# Patient Record
Sex: MALE | Race: WHITE | NOT HISPANIC OR LATINO | Employment: OTHER | URBAN - METROPOLITAN AREA
[De-identification: names, ages, dates, MRNs, and addresses within clinical notes are randomized per-mention and may not be internally consistent; named-entity substitution may affect disease eponyms.]

---

## 2017-12-21 ENCOUNTER — HOSPITAL ENCOUNTER (OUTPATIENT)
Dept: GENERAL RADIOLOGY | Facility: HOSPITAL | Age: 72
Discharge: HOME | End: 2017-12-21
Attending: UROLOGY | Admitting: UROLOGY

## 2017-12-21 ENCOUNTER — TRANSCRIBE ORDERS (OUTPATIENT)
Dept: ADMINISTRATIVE | Facility: HOSPITAL | Age: 72
End: 2017-12-21

## 2017-12-21 DIAGNOSIS — N20.0 CALCULUS, RENAL: Primary | ICD-10-CM

## 2017-12-21 DIAGNOSIS — N20.0 CALCULUS, RENAL: ICD-10-CM

## 2017-12-21 PROCEDURE — 74000 HC ABDOMEN KUB: CPT

## 2018-12-20 ENCOUNTER — HOSPITAL ENCOUNTER (OUTPATIENT)
Dept: GENERAL RADIOLOGY | Facility: HOSPITAL | Age: 73
Discharge: HOME | End: 2018-12-20
Attending: UROLOGY | Admitting: UROLOGY

## 2018-12-20 ENCOUNTER — TRANSCRIBE ORDERS (OUTPATIENT)
Dept: ADMINISTRATIVE | Facility: HOSPITAL | Age: 73
End: 2018-12-20

## 2018-12-20 DIAGNOSIS — N20.0 CALCULUS, RENAL: ICD-10-CM

## 2018-12-20 DIAGNOSIS — N20.0 CALCULUS, RENAL: Primary | ICD-10-CM

## 2018-12-20 PROCEDURE — 74018 RADEX ABDOMEN 1 VIEW: CPT

## 2021-05-20 ENCOUNTER — HOSPITAL ENCOUNTER (OUTPATIENT)
Dept: CT IMAGING | Facility: HOSPITAL | Age: 76
Discharge: HOME | End: 2021-05-20
Admitting: UROLOGY

## 2021-05-20 ENCOUNTER — TRANSCRIBE ORDERS (OUTPATIENT)
Dept: ADMINISTRATIVE | Facility: HOSPITAL | Age: 76
End: 2021-05-20

## 2021-05-20 DIAGNOSIS — N20.1 CALCULUS OF URETER: Primary | ICD-10-CM

## 2021-05-20 DIAGNOSIS — N20.1 CALCULUS OF URETER: ICD-10-CM

## 2021-05-20 PROCEDURE — 74176 CT ABD & PELVIS W/O CONTRAST: CPT

## 2021-12-07 ENCOUNTER — LAB (OUTPATIENT)
Dept: LAB | Facility: HOSPITAL | Age: 76
End: 2021-12-07

## 2021-12-07 ENCOUNTER — TRANSCRIBE ORDERS (OUTPATIENT)
Dept: ADMINISTRATIVE | Facility: HOSPITAL | Age: 76
End: 2021-12-07

## 2021-12-07 DIAGNOSIS — N18.30 STAGE 3 CHRONIC KIDNEY DISEASE, UNSPECIFIED WHETHER STAGE 3A OR 3B CKD (HCC): Primary | ICD-10-CM

## 2021-12-07 DIAGNOSIS — N18.30 STAGE 3 CHRONIC KIDNEY DISEASE, UNSPECIFIED WHETHER STAGE 3A OR 3B CKD (HCC): ICD-10-CM

## 2021-12-07 LAB
ALBUMIN SERPL-MCNC: 4.3 G/DL (ref 3.5–5.2)
ALBUMIN/GLOB SERPL: 1.4 G/DL
ALP SERPL-CCNC: 63 U/L (ref 39–117)
ALT SERPL W P-5'-P-CCNC: 22 U/L (ref 1–41)
ANION GAP SERPL CALCULATED.3IONS-SCNC: 12 MMOL/L (ref 5–15)
AST SERPL-CCNC: 22 U/L (ref 1–40)
BILIRUB SERPL-MCNC: 0.3 MG/DL (ref 0–1.2)
BILIRUB UR QL STRIP: NEGATIVE
BUN SERPL-MCNC: 26 MG/DL (ref 8–23)
BUN/CREAT SERPL: 16.5 (ref 7–25)
CALCIUM SPEC-SCNC: 9.7 MG/DL (ref 8.6–10.5)
CHLORIDE SERPL-SCNC: 103 MMOL/L (ref 98–107)
CLARITY UR: CLEAR
CO2 SERPL-SCNC: 27 MMOL/L (ref 22–29)
COLOR UR: YELLOW
CREAT SERPL-MCNC: 1.58 MG/DL (ref 0.76–1.27)
GFR SERPL CREATININE-BSD FRML MDRD: 43 ML/MIN/1.73
GLOBULIN UR ELPH-MCNC: 3.1 GM/DL
GLUCOSE SERPL-MCNC: 168 MG/DL (ref 65–99)
GLUCOSE UR STRIP-MCNC: NEGATIVE MG/DL
HGB UR QL STRIP.AUTO: NEGATIVE
KETONES UR QL STRIP: NEGATIVE
LEUKOCYTE ESTERASE UR QL STRIP.AUTO: NEGATIVE
MAGNESIUM SERPL-MCNC: 1.7 MG/DL (ref 1.6–2.4)
NITRITE UR QL STRIP: NEGATIVE
PH UR STRIP.AUTO: <=5 [PH] (ref 5–8)
PHOSPHATE SERPL-MCNC: 3.3 MG/DL (ref 2.5–4.5)
POTASSIUM SERPL-SCNC: 4.5 MMOL/L (ref 3.5–5.2)
PROT SERPL-MCNC: 7.4 G/DL (ref 6–8.5)
PROT UR QL STRIP: NEGATIVE
PTH-INTACT SERPL-MCNC: 52 PG/ML (ref 15–65)
SODIUM SERPL-SCNC: 142 MMOL/L (ref 136–145)
SP GR UR STRIP: 1.01 (ref 1–1.03)
URATE SERPL-MCNC: 5.1 MG/DL (ref 3.4–7)
UROBILINOGEN UR QL STRIP: NORMAL

## 2021-12-07 PROCEDURE — 80053 COMPREHEN METABOLIC PANEL: CPT

## 2021-12-07 PROCEDURE — 83970 ASSAY OF PARATHORMONE: CPT

## 2021-12-07 PROCEDURE — 81003 URINALYSIS AUTO W/O SCOPE: CPT | Performed by: INTERNAL MEDICINE

## 2021-12-07 PROCEDURE — 36415 COLL VENOUS BLD VENIPUNCTURE: CPT

## 2021-12-07 PROCEDURE — 84550 ASSAY OF BLOOD/URIC ACID: CPT

## 2021-12-07 PROCEDURE — 83735 ASSAY OF MAGNESIUM: CPT

## 2021-12-07 PROCEDURE — 84100 ASSAY OF PHOSPHORUS: CPT

## 2022-03-24 ENCOUNTER — TRANSCRIBE ORDERS (OUTPATIENT)
Dept: ADMINISTRATIVE | Facility: HOSPITAL | Age: 77
End: 2022-03-24

## 2022-03-24 ENCOUNTER — HOSPITAL ENCOUNTER (OUTPATIENT)
Dept: GENERAL RADIOLOGY | Facility: HOSPITAL | Age: 77
Discharge: HOME | End: 2022-03-24
Admitting: UROLOGY

## 2022-03-24 DIAGNOSIS — N20.0 KIDNEY STONES: Primary | ICD-10-CM

## 2022-03-24 DIAGNOSIS — N20.0 KIDNEY STONES: ICD-10-CM

## 2022-03-24 PROCEDURE — 74018 RADEX ABDOMEN 1 VIEW: CPT

## 2022-11-29 ENCOUNTER — LAB (OUTPATIENT)
Dept: LAB | Facility: HOSPITAL | Age: 77
End: 2022-11-29

## 2022-11-29 ENCOUNTER — TRANSCRIBE ORDERS (OUTPATIENT)
Dept: ADMINISTRATIVE | Facility: HOSPITAL | Age: 77
End: 2022-11-29

## 2022-11-29 DIAGNOSIS — E11.22 TYPE 2 DIABETES MELLITUS WITH ESRD (END-STAGE RENAL DISEASE): ICD-10-CM

## 2022-11-29 DIAGNOSIS — N20.0 URIC ACID NEPHROLITHIASIS: ICD-10-CM

## 2022-11-29 DIAGNOSIS — N18.6 TYPE 2 DIABETES MELLITUS WITH ESRD (END-STAGE RENAL DISEASE): ICD-10-CM

## 2022-11-29 DIAGNOSIS — I12.9 HYPERTENSIVE NEPHROPATHY: ICD-10-CM

## 2022-11-29 DIAGNOSIS — N18.32 CHRONIC KIDNEY DISEASE (CKD) STAGE G3B/A1, MODERATELY DECREASED GLOMERULAR FILTRATION RATE (GFR) BETWEEN 30-44 ML/MIN/1.73 SQUARE METER AND ALBUMINURIA CREATININE RATIO LESS THAN 30 MG/G (CMS/H*: Primary | ICD-10-CM

## 2022-11-29 DIAGNOSIS — N18.32 CHRONIC KIDNEY DISEASE (CKD) STAGE G3B/A1, MODERATELY DECREASED GLOMERULAR FILTRATION RATE (GFR) BETWEEN 30-44 ML/MIN/1.73 SQUARE METER AND ALBUMINURIA CREATININE RATIO LESS THAN 30 MG/G (CMS/H*: ICD-10-CM

## 2022-11-29 LAB
ALBUMIN SERPL-MCNC: 4.3 G/DL (ref 3.5–5.2)
ALBUMIN UR-MCNC: <1.2 MG/DL
ANION GAP SERPL CALCULATED.3IONS-SCNC: 10.2 MMOL/L (ref 5–15)
BACTERIA UR QL AUTO: NORMAL /HPF
BILIRUB UR QL STRIP: NEGATIVE
BUN SERPL-MCNC: 29 MG/DL (ref 8–23)
BUN/CREAT SERPL: 13.7 (ref 7–25)
CALCIUM SPEC-SCNC: 9.9 MG/DL (ref 8.6–10.5)
CHLORIDE SERPL-SCNC: 97 MMOL/L (ref 98–107)
CLARITY UR: CLEAR
CO2 SERPL-SCNC: 30.8 MMOL/L (ref 22–29)
COLOR UR: YELLOW
CREAT SERPL-MCNC: 2.12 MG/DL (ref 0.76–1.27)
CREAT UR-MCNC: 76.6 MG/DL
CREAT UR-MCNC: 76.6 MG/DL
EGFRCR SERPLBLD CKD-EPI 2021: 31.1 ML/MIN/1.73
GLUCOSE SERPL-MCNC: 295 MG/DL (ref 65–99)
GLUCOSE UR STRIP-MCNC: NEGATIVE MG/DL
HGB UR QL STRIP.AUTO: NEGATIVE
HYALINE CASTS UR QL AUTO: NORMAL /LPF
KETONES UR QL STRIP: NEGATIVE
LEUKOCYTE ESTERASE UR QL STRIP.AUTO: NEGATIVE
MICROALBUMIN/CREAT UR: NORMAL MG/G{CREAT}
NITRITE UR QL STRIP: NEGATIVE
PH UR STRIP.AUTO: 6 [PH] (ref 5–8)
PHOSPHATE SERPL-MCNC: 3.5 MG/DL (ref 2.5–4.5)
POTASSIUM SERPL-SCNC: 4.5 MMOL/L (ref 3.5–5.2)
PROT ?TM UR-MCNC: 4.4 MG/DL
PROT UR QL STRIP: NEGATIVE
PROT/CREAT UR: 57.4 MG/G CREA (ref 0–200)
RBC # UR STRIP: NORMAL /HPF
REF LAB TEST METHOD: NORMAL
SODIUM SERPL-SCNC: 138 MMOL/L (ref 136–145)
SP GR UR STRIP: 1.01 (ref 1–1.03)
SQUAMOUS #/AREA URNS HPF: NORMAL /HPF
UROBILINOGEN UR QL STRIP: NORMAL
WBC # UR STRIP: NORMAL /HPF

## 2022-11-29 PROCEDURE — 36415 COLL VENOUS BLD VENIPUNCTURE: CPT

## 2022-11-29 PROCEDURE — 82043 UR ALBUMIN QUANTITATIVE: CPT

## 2022-11-29 PROCEDURE — 82570 ASSAY OF URINE CREATININE: CPT

## 2022-11-29 PROCEDURE — 81001 URINALYSIS AUTO W/SCOPE: CPT

## 2022-11-29 PROCEDURE — 84156 ASSAY OF PROTEIN URINE: CPT

## 2022-11-29 PROCEDURE — 80069 RENAL FUNCTION PANEL: CPT

## 2022-12-20 ENCOUNTER — HOSPITAL ENCOUNTER (INPATIENT)
Facility: HOSPITAL | Age: 77
LOS: 8 days | Discharge: HOME HEALTH | DRG: 252 | End: 2022-12-28
Attending: EMERGENCY MEDICINE | Admitting: INTERNAL MEDICINE
Payer: MEDICARE

## 2022-12-20 ENCOUNTER — APPOINTMENT (OUTPATIENT)
Dept: CT IMAGING | Facility: HOSPITAL | Age: 77
DRG: 252 | End: 2022-12-20
Payer: MEDICARE

## 2022-12-20 ENCOUNTER — APPOINTMENT (OUTPATIENT)
Dept: GENERAL RADIOLOGY | Facility: HOSPITAL | Age: 77
DRG: 252 | End: 2022-12-20
Payer: MEDICARE

## 2022-12-20 DIAGNOSIS — I46.9 CARDIAC ARREST: Primary | ICD-10-CM

## 2022-12-20 DIAGNOSIS — G89.4 CHRONIC PAIN SYNDROME: ICD-10-CM

## 2022-12-20 LAB
ALBUMIN SERPL-MCNC: 3.3 G/DL (ref 3.5–5.2)
ALBUMIN/GLOB SERPL: 1.3 G/DL
ALP SERPL-CCNC: 58 U/L (ref 39–117)
ALT SERPL W P-5'-P-CCNC: 21 U/L (ref 1–41)
ANION GAP SERPL CALCULATED.3IONS-SCNC: 8 MMOL/L (ref 5–15)
APTT PPP: 31.5 SECONDS (ref 22.7–35.4)
ARTERIAL PATENCY WRIST A: POSITIVE
AST SERPL-CCNC: 34 U/L (ref 1–40)
ATMOSPHERIC PRESS: 756.5 MMHG
BASE EXCESS BLDA CALC-SCNC: 0.1 MMOL/L (ref 0–2)
BDY SITE: ABNORMAL
BILIRUB SERPL-MCNC: 0.5 MG/DL (ref 0–1.2)
BUN SERPL-MCNC: 27 MG/DL (ref 8–23)
BUN/CREAT SERPL: 16.5 (ref 7–25)
CALCIUM SPEC-SCNC: 8.6 MG/DL (ref 8.6–10.5)
CHLORIDE SERPL-SCNC: 108 MMOL/L (ref 98–107)
CO2 SERPL-SCNC: 25 MMOL/L (ref 22–29)
CREAT SERPL-MCNC: 1.64 MG/DL (ref 0.76–1.27)
D-LACTATE SERPL-SCNC: 8.7 MMOL/L (ref 0.5–2)
DEPRECATED RDW RBC AUTO: 50.1 FL (ref 37–54)
EGFRCR SERPLBLD CKD-EPI 2021: 42.3 ML/MIN/1.73
EOSINOPHIL # BLD MANUAL: 0.19 10*3/MM3 (ref 0–0.4)
EOSINOPHIL NFR BLD MANUAL: 3.2 % (ref 0.3–6.2)
ERYTHROCYTE [DISTWIDTH] IN BLOOD BY AUTOMATED COUNT: 12.9 % (ref 12.3–15.4)
GLOBULIN UR ELPH-MCNC: 2.6 GM/DL
GLUCOSE BLDC GLUCOMTR-MCNC: 168 MG/DL (ref 70–130)
GLUCOSE SERPL-MCNC: 168 MG/DL (ref 65–99)
HCO3 BLDA-SCNC: 24.7 MMOL/L (ref 22–28)
HCT VFR BLD AUTO: 37.3 % (ref 37.5–51)
HGB BLD-MCNC: 12.3 G/DL (ref 13–17.7)
INHALED O2 CONCENTRATION: 100 %
INR PPP: 1.09 (ref 0.9–1.1)
LYMPHOCYTES # BLD MANUAL: 1.45 10*3/MM3 (ref 0.7–3.1)
LYMPHOCYTES NFR BLD MANUAL: 6.5 % (ref 5–12)
MAGNESIUM SERPL-MCNC: 1.6 MG/DL (ref 1.6–2.4)
MCH RBC QN AUTO: 34.6 PG (ref 26.6–33)
MCHC RBC AUTO-ENTMCNC: 33 G/DL (ref 31.5–35.7)
MCV RBC AUTO: 105.1 FL (ref 79–97)
MODALITY: ABNORMAL
MONOCYTES # BLD: 0.38 10*3/MM3 (ref 0.1–0.9)
MRSA DNA SPEC QL NAA+PROBE: NORMAL
NEUTROPHILS # BLD AUTO: 3.86 10*3/MM3 (ref 1.7–7)
NEUTROPHILS NFR BLD MANUAL: 65.6 % (ref 42.7–76)
NT-PROBNP SERPL-MCNC: 77.4 PG/ML (ref 0–1800)
O2 A-A PPRESDIFF RESPIRATORY: 0.3 MMHG
PCO2 BLDA: 39.2 MM HG (ref 35–45)
PEEP RESPIRATORY: 5 CM[H2O]
PH BLDA: 7.41 PH UNITS (ref 7.35–7.45)
PLAT MORPH BLD: NORMAL
PLATELET # BLD AUTO: 171 10*3/MM3 (ref 140–450)
PMV BLD AUTO: 9.5 FL (ref 6–12)
PO2 BLDA: 200.8 MM HG (ref 80–100)
POTASSIUM SERPL-SCNC: 4.5 MMOL/L (ref 3.5–5.2)
PROT SERPL-MCNC: 5.9 G/DL (ref 6–8.5)
PROTHROMBIN TIME: 14.3 SECONDS (ref 11.7–14.2)
QT INTERVAL: 500 MS
RBC # BLD AUTO: 3.55 10*6/MM3 (ref 4.14–5.8)
RBC MORPH BLD: NORMAL
SAO2 % BLDCOA: 99.7 % (ref 92–99)
SET MECH RESP RATE: 18
SMUDGE CELLS BLD QL SMEAR: NORMAL
SODIUM SERPL-SCNC: 141 MMOL/L (ref 136–145)
TOTAL RATE: 18 BREATHS/MINUTE
TROPONIN T SERPL-MCNC: <0.01 NG/ML (ref 0–0.03)
TROPONIN T SERPL-MCNC: <0.01 NG/ML (ref 0–0.03)
VARIANT LYMPHS NFR BLD MANUAL: 24.7 % (ref 19.6–45.3)
VENTILATOR MODE: ABNORMAL
VT ON VENT VENT: 600 ML
WBC NRBC COR # BLD: 5.88 10*3/MM3 (ref 3.4–10.8)

## 2022-12-20 PROCEDURE — 85025 COMPLETE CBC W/AUTO DIFF WBC: CPT | Performed by: EMERGENCY MEDICINE

## 2022-12-20 PROCEDURE — 94799 UNLISTED PULMONARY SVC/PX: CPT

## 2022-12-20 PROCEDURE — 71045 X-RAY EXAM CHEST 1 VIEW: CPT

## 2022-12-20 PROCEDURE — 25010000002 PROPOFOL 10 MG/ML EMULSION: Performed by: INTERNAL MEDICINE

## 2022-12-20 PROCEDURE — 99285 EMERGENCY DEPT VISIT HI MDM: CPT

## 2022-12-20 PROCEDURE — 25010000002 ATROPINE SULFATE: Performed by: EMERGENCY MEDICINE

## 2022-12-20 PROCEDURE — 94002 VENT MGMT INPAT INIT DAY: CPT

## 2022-12-20 PROCEDURE — 36415 COLL VENOUS BLD VENIPUNCTURE: CPT

## 2022-12-20 PROCEDURE — 94761 N-INVAS EAR/PLS OXIMETRY MLT: CPT

## 2022-12-20 PROCEDURE — 36600 WITHDRAWAL OF ARTERIAL BLOOD: CPT

## 2022-12-20 PROCEDURE — 87040 BLOOD CULTURE FOR BACTERIA: CPT | Performed by: INTERNAL MEDICINE

## 2022-12-20 PROCEDURE — 82962 GLUCOSE BLOOD TEST: CPT

## 2022-12-20 PROCEDURE — 25010000002 EPINEPHRINE 1 MG/ML SOLUTION: Performed by: STUDENT IN AN ORGANIZED HEALTH CARE EDUCATION/TRAINING PROGRAM

## 2022-12-20 PROCEDURE — 82565 ASSAY OF CREATININE: CPT

## 2022-12-20 PROCEDURE — 93005 ELECTROCARDIOGRAM TRACING: CPT | Performed by: EMERGENCY MEDICINE

## 2022-12-20 PROCEDURE — 99222 1ST HOSP IP/OBS MODERATE 55: CPT | Performed by: INTERNAL MEDICINE

## 2022-12-20 PROCEDURE — 70450 CT HEAD/BRAIN W/O DYE: CPT

## 2022-12-20 PROCEDURE — 84484 ASSAY OF TROPONIN QUANT: CPT | Performed by: EMERGENCY MEDICINE

## 2022-12-20 PROCEDURE — 85730 THROMBOPLASTIN TIME PARTIAL: CPT | Performed by: SURGERY

## 2022-12-20 PROCEDURE — 5A1945Z RESPIRATORY VENTILATION, 24-96 CONSECUTIVE HOURS: ICD-10-PCS | Performed by: INTERNAL MEDICINE

## 2022-12-20 PROCEDURE — 3E03329 INTRODUCTION OF OTHER ANTI-INFECTIVE INTO PERIPHERAL VEIN, PERCUTANEOUS APPROACH: ICD-10-PCS | Performed by: INTERNAL MEDICINE

## 2022-12-20 PROCEDURE — 25010000002 VANCOMYCIN 10 G RECONSTITUTED SOLUTION: Performed by: INTERNAL MEDICINE

## 2022-12-20 PROCEDURE — 87641 MR-STAPH DNA AMP PROBE: CPT | Performed by: INTERNAL MEDICINE

## 2022-12-20 PROCEDURE — 25010000002 PROPOFOL 10 MG/ML EMULSION: Performed by: EMERGENCY MEDICINE

## 2022-12-20 PROCEDURE — 83735 ASSAY OF MAGNESIUM: CPT | Performed by: EMERGENCY MEDICINE

## 2022-12-20 PROCEDURE — 80053 COMPREHEN METABOLIC PANEL: CPT | Performed by: EMERGENCY MEDICINE

## 2022-12-20 PROCEDURE — 82803 BLOOD GASES ANY COMBINATION: CPT

## 2022-12-20 PROCEDURE — 85610 PROTHROMBIN TIME: CPT | Performed by: SURGERY

## 2022-12-20 PROCEDURE — 83605 ASSAY OF LACTIC ACID: CPT | Performed by: INTERNAL MEDICINE

## 2022-12-20 PROCEDURE — 93010 ELECTROCARDIOGRAM REPORT: CPT | Performed by: STUDENT IN AN ORGANIZED HEALTH CARE EDUCATION/TRAINING PROGRAM

## 2022-12-20 PROCEDURE — 71275 CT ANGIOGRAPHY CHEST: CPT

## 2022-12-20 PROCEDURE — 0 IOPAMIDOL PER 1 ML: Performed by: EMERGENCY MEDICINE

## 2022-12-20 PROCEDURE — 85007 BL SMEAR W/DIFF WBC COUNT: CPT | Performed by: EMERGENCY MEDICINE

## 2022-12-20 PROCEDURE — 83880 ASSAY OF NATRIURETIC PEPTIDE: CPT | Performed by: EMERGENCY MEDICINE

## 2022-12-20 PROCEDURE — 25010000002 CALCIUM GLUCONATE-NACL 1-0.675 GM/50ML-% SOLUTION: Performed by: EMERGENCY MEDICINE

## 2022-12-20 RX ORDER — SODIUM CHLORIDE 9 MG/ML
40 INJECTION, SOLUTION INTRAVENOUS AS NEEDED
Status: DISCONTINUED | OUTPATIENT
Start: 2022-12-20 | End: 2022-12-28 | Stop reason: HOSPADM

## 2022-12-20 RX ORDER — HEPARIN SODIUM 10000 [USP'U]/100ML
5 INJECTION, SOLUTION INTRAVENOUS CONTINUOUS
Status: DISCONTINUED | OUTPATIENT
Start: 2022-12-20 | End: 2022-12-21

## 2022-12-20 RX ORDER — NICOTINE POLACRILEX 4 MG
15 LOZENGE BUCCAL
Status: DISCONTINUED | OUTPATIENT
Start: 2022-12-20 | End: 2022-12-28 | Stop reason: HOSPADM

## 2022-12-20 RX ORDER — SODIUM CHLORIDE 0.9 % (FLUSH) 0.9 %
10 SYRINGE (ML) INJECTION AS NEEDED
Status: DISCONTINUED | OUTPATIENT
Start: 2022-12-20 | End: 2022-12-28 | Stop reason: HOSPADM

## 2022-12-20 RX ORDER — SODIUM CHLORIDE 9 MG/ML
125 INJECTION, SOLUTION INTRAVENOUS CONTINUOUS
Status: DISCONTINUED | OUTPATIENT
Start: 2022-12-20 | End: 2022-12-21

## 2022-12-20 RX ORDER — CALCIUM GLUCONATE 20 MG/ML
1 INJECTION, SOLUTION INTRAVENOUS ONCE
Status: COMPLETED | OUTPATIENT
Start: 2022-12-20 | End: 2022-12-20

## 2022-12-20 RX ORDER — SODIUM CHLORIDE 0.9 % (FLUSH) 0.9 %
10 SYRINGE (ML) INJECTION EVERY 12 HOURS SCHEDULED
Status: DISCONTINUED | OUTPATIENT
Start: 2022-12-20 | End: 2022-12-28 | Stop reason: HOSPADM

## 2022-12-20 RX ORDER — DEXTROSE MONOHYDRATE 25 G/50ML
25 INJECTION, SOLUTION INTRAVENOUS
Status: DISCONTINUED | OUTPATIENT
Start: 2022-12-20 | End: 2022-12-28 | Stop reason: HOSPADM

## 2022-12-20 RX ADMIN — Medication 10 ML: at 21:25

## 2022-12-20 RX ADMIN — ATROPINE SULFATE 0.5 MG: 0.1 INJECTION INTRAVENOUS at 15:36

## 2022-12-20 RX ADMIN — EPINEPHRINE 0.02 MCG/KG/MIN: 1 INJECTION INTRAMUSCULAR; INTRAVENOUS; SUBCUTANEOUS at 16:58

## 2022-12-20 RX ADMIN — IOPAMIDOL 100 ML: 755 INJECTION, SOLUTION INTRAVENOUS at 16:55

## 2022-12-20 RX ADMIN — PROPOFOL INJECTABLE EMULSION 15 MCG/KG/MIN: 10 INJECTION, EMULSION INTRAVENOUS at 15:32

## 2022-12-20 RX ADMIN — CALCIUM GLUCONATE 1 G: 20 INJECTION, SOLUTION INTRAVENOUS at 15:49

## 2022-12-20 RX ADMIN — PROPOFOL INJECTABLE EMULSION 40 MCG/KG/MIN: 10 INJECTION, EMULSION INTRAVENOUS at 18:31

## 2022-12-20 RX ADMIN — PROPOFOL INJECTABLE EMULSION 30 MCG/KG/MIN: 10 INJECTION, EMULSION INTRAVENOUS at 22:33

## 2022-12-20 RX ADMIN — VANCOMYCIN HYDROCHLORIDE 2250 MG: 10 INJECTION, POWDER, LYOPHILIZED, FOR SOLUTION INTRAVENOUS at 22:32

## 2022-12-20 NOTE — CONSULTS
[facility] Consult    Patient Name: Evangelist Garcia  Age/Sex: 77 y.o. male  : 1945  MRN: [id number]    Date of Admission: 2022  Date of Encounter Visit: 22  Encounter Provider: Flor Grande MD  Referring Provider: Jose Salmon MD  Place of Service: [facility]  Patient Care Team:  Misa Garcia MD as PCP - General (Internal Medicine)    Subjective:     Consulted for: Cardiac arrest, bradycardia    Chief Complaint: Cardiac arrest    History of Present Illness:  Evangelist Garcia is a 77 y.o. male patient of Dr. 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      The patient was evaluated at Dr. Sinha's office on  pre-operatively and it was felt he was of acceptable risk to proceed with surgery.  He underwent left lower extremity revascularization on  without any significant issues.  He returns today for revascularization of his right lower extremity.  There apparently no issues until the end of the case when they were deploying a Perclose device of the right femoral access.  The patient apparently became unresponsive and hypoxic.  Sheath was replaced and the patient was bagged.  He was given Narcan and shortly after that he lost his pulse and appear to be in PEA.  He was resuscitated and upon EMS arrival the patient had a pulse.  Upon their arrival he was intubated.      Following his arrival to the emergency room here he was stable for a period of time until he went to the CT scanner for CT of the head and a CT angiogram of the chest.  Shortly after the CT scan the patient's heart rates dropped from the 40s to the 30s and according to the nurse it  appeared that he had 2 long pauses.  At this point she was unable to find a pulse and resuscitation was repeated.  The patient was quickly resuscitated.  Following his return to the emergency room he was placed on an epinephrine drip and his heart rates went from a baseline in the 40s to sinus rhythm in the 70s.    The patient's wife confirms no prior history of no known cardiac issues.  She reports that he has chronic dyspnea on exertion that is largely unchanged over the last 6 months.  She denies any recent history of chest pain, lightheadedness, near-syncope or syncope.      Past Medical History:  1.  Hypertension  2.  Hyperlipidemia  3.  Recurrent DVTs  4.  Diabetes mellitus type 2  5.  Peripheral vascular disease    Home Medications:   No medications prior to admission.       Allergies:  Allergies   Allergen Reactions   • Nsaids Other (See Comments)     High cr  High cr         Past Social History:  Social History     Socioeconomic History   • Marital status:        Past Family History:  No family history on file.    Review of Systems:   All systems reviewed. Pertinent positives identified in HPI. All other systems are negative.    Objective:   Temp:  [97.8 °F (36.6 °C)] 97.8 °F (36.6 °C)  Heart Rate:  [46-77] 77  Resp:  [18] 18  BP: ()/(64-86) 128/79  FiO2 (%):  [50 %-100 %] 50 %     Intake/Output Summary (Last 24 hours) at 12/20/2022 1821  Last data filed at 12/20/2022 1606  Gross per 24 hour   Intake 50 ml   Output --   Net 50 ml     Body mass index is 37.29 kg/m².      12/20/22  1526   Weight: 118 kg (259 lb 14.4 oz)     Weight change:     Physical Exam:   General Appearance:   Intubated   Head:    Normocephalic, without obvious abnormality, atraumatic   Eyes:            Lids and lashes normal, conjunctivae and sclerae normal, no   icterus, no pallor, corneas clear, PERRLA   Ears:    Ears appear intact with no abnormalities noted   Neck:   No adenopathy, supple, trachea midline, no  thyromegaly, no   carotid bruit, no JVD   Lungs:     Clear to auscultation,respirations regular, even and unlabored    Heart:    Regular rhythm and normal rate, normal S1 and S2, no murmur, no gallop, no rub, no click   Chest Wall:    No abnormalities observed   Abdomen:     Normal bowel sounds, no masses, no organomegaly, soft        non-tender, non-distended, no guarding, no rebound  tenderness   Extremities:   Moves all extremities well, no edema, no cyanosis, no redness   Pulses:   Pulses palpable and equal bilaterally. Normal radial, carotid, femoral, dorsalis pedis and posterior tibial pulses bilaterally. Normal abdominal aorta   Skin:  Psychiatric:   No bleeding, bruising or rash  Sedated     Lab Review:   Results from last 7 days   Lab Units 12/20/22  1534   SODIUM mmol/L 141   POTASSIUM mmol/L 4.5   CHLORIDE mmol/L 108*   CO2 mmol/L 25.0   BUN mg/dL 27*   CREATININE mg/dL 1.64*   GLUCOSE mg/dL 168*   CALCIUM mg/dL 8.6   AST (SGOT) U/L 34   ALT (SGPT) U/L 21     Results from last 7 days   Lab Units 12/20/22  1534   TROPONIN T ng/mL <0.010     Results from last 7 days   Lab Units 12/20/22  1534   WBC 10*3/mm3 5.88   HEMOGLOBIN g/dL 12.3*   HEMATOCRIT % 37.3*   PLATELETS 10*3/mm3 171         Results from last 7 days   Lab Units 12/20/22  1534   MAGNESIUM mg/dL 1.6           Invalid input(s): LDLCALC  Results from last 7 days   Lab Units 12/20/22  1534   PROBNP pg/mL 77.4           Echo EF Estimated  No results found for: ECHOEFEST    EKG:     Imaging:  Imaging Results (Most Recent)     Procedure Component Value Units Date/Time    CT Angiogram Chest [[id number]] Collected: 12/20/22 1743     Updated: 12/20/22 1756    Narrative:      CT ANGIOGRAM CHEST     HISTORY: Possible pulmonary embolism. Unknown D-dimer.      TECHNIQUE: CT angiogram of the chest performed using 100 mL of  Isovue-370 contrast. Multiplanar and 3-dimensional reformatted images  were produced at a separate workstation.     FINDINGS: There is an  endotracheal tube positioned as expected. Contrast  material is observed in the superior vena cava and passes caudally,  flowing retrograde down the IVC into the right lobe hepatic veins.  Configuration suggests poor cardiac output. The aorta is very faintly  opacified and is normal in caliber without evidence of dissection.  Central pulmonary arteries are nicely opacified, normal caliber, and  enhance as expected. The more peripheral pulmonary arteries are poorly  opacified in areas but no truncated filling defect or other lesion  suggestive of embolism is present. Low lung volumes with parenchymal  density and air bronchograms along the posterior lungs, most extensively  in the left lower lobe is favored to represent atelectasis. No airway  obstructing lesion.     There is no pleural or pericardial effusion. The bones appear normal.       Impression:      Secondary evidence of poor cardiac output. Low lung volumes  with dependent atelectasis. No evidence of central pulmonary embolism. I  discussed the case with Dr. Saez at 5:11 PM.     Radiation dose reduction techniques were utilized, including automated  exposure control and exposure modulation based on body size.     This report was finalized on 12/20/2022 5:53 PM by Dr. Braydon Mcdowell M.D.       CT Head Without Contrast [[id number]] Collected: 12/20/22 1729     Updated: 12/20/22 1729    Narrative:      EMERGENCY NONCONTRAST HEAD CT 12/20/2022     CLINICAL HISTORY: Cardiac arrest, patient is intubated on ventilator.     TECHNIQUE: Spiral CT images were obtained from the base of the skull to  the vertex without intravenous contrast. The images were reformatted and  submitted in 3 mm thick axial, sagittal and coronal CT sections with  brain algorithm.      COMPARISON: There are no prior studies from [facility] for comparison.     FINDINGS: There is some mild patchy low-density in the periventricular  white matter of the cerebral  hemispheres consistent with mild small  vessel disease. The remainder of the brain parenchyma is normal in  attenuation. The ventricles are normal in size. I see no focal mass  effect. There is no midline shift. No extra-axial fluid collections are  identified and there is no evidence of acute intracranial hemorrhage.  The calvarium and the skull base are normal in appearance. The paranasal  sinuses and the mastoid air cells are clear. There is fluid and  secretions in the nasopharyngeal airway and this patient is intubated  and is a common finding in intubated patients.       Impression:      1. No acute intracranial abnormality is identified. Other than mild  small vessel disease in the periventricular white matter mild cerebral  atrophy. Head CT is within normal limits. In patients with cardiac  arrest, head CT immediately following cardiac arrest would be too early  to see cerebral edema. If the patient has persistent neurologic symptoms  follow-up CT or MRI of the brain could be obtained to further evaluate.                 Radiation dose reduction techniques were utilized, including automated  exposure control and exposure modulation based on body size.          XR Chest 1 View [[id number]] Resulted: 12/20/22 1555     Updated: 12/20/22 1556    XR Chest 1 View [[id number]] Collected: 12/20/22 1545     Updated: 12/20/22 1549    Narrative:      XR CHEST 1 VW-  12/20/2022     HISTORY: Intubation.     The endotracheal tube is seen with its tip in the right mainstem  bronchus and should be retracted for better placement. Heart size is at  the upper limits of normal for lungs are underinflated and there is some  mild suspected atelectasis in the left lung base. Left base is obscured  by the cardioversion type device. No pneumothorax is seen.     Report was discussed with Dr. Saez     This report was finalized on 12/20/2022 3:46 PM by Dr. Bryn Elizabeth M.D.             I personally viewed and interpreted the  patient's EKG    Assessment/Plan:     1. Cardiac arrest.  Recurrent PEA arrest.   Troponin and BNP are normal.  EKG with chronic bifascicular block but otherwise no ischemic changes.  No reports of ventricular arrhythmias.  2. Bradycardia.  Reportedly preceded by bradycardia and possible pauses but no strips to review available.  No definite heart block noted or reported.  3. Peripheral vascular disease.  Status post left lower extremity intervention.  Now status post right lower extremity intervention with a right femoral antegrade access.  Vascular surgery is following.  4.  History of recurrent DVT.  CT angiogram of the chest here shows no evidence of pulmonary embolism.  5.  History of hypertension  6.  Hyperlipidemia  7.  Diabetes mellitus type 2  8.  Chronic kidney disease  9.  Obesity    -Unclear what caused his initial and recurrent bradycardic episode.  I wonder if extreme vagal reaction was responsible since his initial episode since it occurred while the closure device was being deployed.  - Without any evidence of heart block and improvement in his heart rates with epinephrine infusion I would hold off on temporary pacer wire placement at this point.  - Continue current supportive care.  - Check echocardiogram in the morning.    Thank you for allowing me to participate in the care of Evangelist Garcia. Feel free to contact me directly with any further questions or concerns.    Flor Grande MD  [facility]  12/20/22  18:21 EST    EMR Dragon/Transcription disclaimer:   Part of this note may be an electronic transcription/translation of spoken language to printed text using the Dragon dictation system.

## 2022-12-20 NOTE — ED NOTES
Nursing report ED to floor  Evangelist Garcia  77 y.o.  male    HPI :   Chief Complaint   Patient presents with    Cardiac Arrest       Admitting doctor:   Jose Salmon MD    Admitting diagnosis:   The encounter diagnosis was Cardiac arrest (HCC).    Code status:   Current Code Status       Date Active Code Status Order ID Comments User Context       Not on file            Allergies:   Nsaids    Isolation:   No active isolations    Intake and Output    Intake/Output Summary (Last 24 hours) at 12/20/2022 1719  Last data filed at 12/20/2022 1606  Gross per 24 hour   Intake 50 ml   Output --   Net 50 ml       Weight:       12/20/22  1526   Weight: 118 kg (259 lb 14.4 oz)       Most recent vitals:   Vitals:    12/20/22 1620 12/20/22 1654 12/20/22 1655 12/20/22 1701   BP:  115/86  128/79   Pulse:   (!) 49 (!) 46   Resp:       Temp:       TempSrc:       SpO2: (!) 0%  100% 100%   Weight:       Height:           Active LDAs/IV Access:   Lines, Drains & Airways       Active LDAs       Name Placement date Placement time Site Days    Peripheral IV 12/20/22 1532 Anterior;Left Forearm 12/20/22  1532  Forearm  less than 1    Peripheral IV 12/20/22 1656 Anterior;Proximal;Right;Upper Arm 12/20/22  1656  Arm  less than 1    Peripheral IV 12/20/22 1652 Anterior;Left;Upper Arm 12/20/22  1652  Arm  less than 1    ETT  12/20/22  1521  -- less than 1                    Labs (abnormal labs have a star):   Labs Reviewed   COMPREHENSIVE METABOLIC PANEL - Abnormal; Notable for the following components:       Result Value    Glucose 168 (*)     BUN 27 (*)     Creatinine 1.64 (*)     Chloride 108 (*)     Total Protein 5.9 (*)     Albumin 3.30 (*)     eGFR 42.3 (*)     All other components within normal limits    Narrative:     GFR Normal >60  Chronic Kidney Disease <60  Kidney Failure <15    The GFR formula is only valid for adults with stable renal function between ages 18 and 70.   CBC WITH AUTO DIFFERENTIAL - Abnormal; Notable for the  following components:    RBC 3.55 (*)     Hemoglobin 12.3 (*)     Hematocrit 37.3 (*)     .1 (*)     MCH 34.6 (*)     All other components within normal limits   BLOOD GAS, ARTERIAL - Abnormal; Notable for the following components:    pO2, Arterial 200.8 (*)     O2 Saturation Calculated 99.7 (*)     All other components within normal limits   TROPONIN (IN-HOUSE) - Normal    Narrative:     Troponin T Reference Range:  <= 0.03 ng/mL-   Negative for AMI  >0.03 ng/mL-     Abnormal for myocardial necrosis.  Clinicians would have to utilize clinical acumen, EKG, Troponin and serial changes to determine if it is an Acute Myocardial Infarction or myocardial injury due to an underlying chronic condition.       Results may be falsely decreased if patient taking Biotin.     BNP (IN-HOUSE) - Normal    Narrative:     Among patients with dyspnea, NT-proBNP is highly sensitive for the detection of acute congestive heart failure. In addition NT-proBNP of <300 pg/ml effectively rules out acute congestive heart failure with 99% negative predictive value.    Results may be falsely decreased if patient taking Biotin.     MAGNESIUM - Normal   BLOOD GAS, ARTERIAL   MANUAL DIFFERENTIAL   TROPONIN (IN-HOUSE)   CBC AND DIFFERENTIAL    Narrative:     The following orders were created for panel order CBC & Differential.  Procedure                               Abnormality         Status                     ---------                               -----------         ------                     CBC Auto Differential[[id number]]        Abnormal            Final result                 Please view results for these tests on the individual orders.       EKG:   ECG 12 Lead Rhythm Change   Final Result   HEART RATE= 48  bpm   RR Interval= 1250  ms   [place] Interval= 205  ms   P Horizontal Axis= -36  deg   P Front Axis= 1  deg   QRSD Interval= 153  ms   QT Interval= 500  ms   QRS Axis= -58  deg   T Wave Axis= 35  deg   - ABNORMAL ECG -   Sinus  bradycardia   Atrial premature complexes   RBBB and LAFB   Probable lateral infarct, old   No Prior Tracing for Comparison   Electronically Signed By: Jonathon Vázquez ([facility]) 20-Dec-2022 15:43:11   Date and Time of Study: 2022-12-20 15:38:14          Meds given in ED:   Medications   propofol (DIPRIVAN) infusion 10 mg/mL 100 mL (35 mcg/kg/min × 118 kg Intravenous Rate/Dose Change 12/20/22 1545)   sodium chloride 0.9 % flush 10 mL (has no administration in time range)   EPINEPHrine 5 mg in 250 mL NS infusion (0.02 mcg/kg/min × 118 kg Intravenous New Bag 12/20/22 1658)   atropine sulfate injection 0.5 mg (0.5 mg Intravenous Given 12/20/22 1536)   calcium gluconate 1g/50ml 0.675% NaCl IV SOLN (0 g Intravenous Stopped 12/20/22 1606)   iopamidol (ISOVUE-370) 76 % injection 100 mL (100 mL Intravenous Given by Other 12/20/22 1655)       Imaging results:  No radiology results for the last day    Ambulatory status:   - BEDREST    Social issues:   Social History     Socioeconomic History    Marital status:        NIH Stroke Scale:         Patience Castro RN  12/20/22 17:19 EST

## 2022-12-20 NOTE — ED NOTES
Pt to ED via ambulance. Pt was at [facility] vascular for arterial line place. Pt in cardiac arrest during procedure. ROSC before EMS arrival.     32mg etomidate, 2mg versed, 100mcg fentanyl by EMS    Pt with pulse on arrival to ED, 7.5 ET tube by EMS

## 2022-12-20 NOTE — ED PROVIDER NOTES
EMERGENCY DEPARTMENT ENCOUNTER    Room Number:  17/17  Date of encounter:  12/20/2022  PCP: Misa Garcia MD  Patient Care Team:  Misa Garcia MD as PCP - General (Internal Medicine)   Historian: EMS, wife    HPI:  Chief Complaint: Cardiac arrest  A complete HPI/ROS/PMH/PSH/SH/FH are unobtainable due to: Unresponsive    Context: Evangelist Garcia is a 77 y.o. male who presents to the ED c/o having episode of cardiac arrest.  He was reportedly at the vascular office getting an outpatient procedure performed when he went into cardiac arrest.  EMS reports that he has an arterial line in his right groin.  They do not know specifics of the arrest.  They do report that he had a pulse upon their arrival.  They intubated him orally.  They gave him etomidate.  He arrives with a pulse and intubated orally.    Prior record review: Notes in care everywhere show that he has a history of stage III chronic kidney disease, bifascicular block.  He sees Dr. Jones.  He has a history of DVT.  On 12/1/2022 his blood pressure was 98/64 and he had a pulse of 72.  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  He has no prior history of coronary disease or infarction.    PAST MEDICAL HISTORY  Active Ambulatory Problems     Diagnosis Date Noted   • No Active Ambulatory Problems     Resolved Ambulatory Problems     Diagnosis Date Noted   • No Resolved Ambulatory Problems     No Additional Past Medical History       The patient has a COVID HM Topic on their chart, and they are fully vaccinated.    PAST SURGICAL HISTORY  No past surgical history on file.      FAMILY HISTORY  No family history on file.      SOCIAL HISTORY  Social History     Socioeconomic History   • Marital status:          ALLERGIES  Nsaids        REVIEW OF SYSTEMS  Review of Systems   Not available due to unresponsive  All systems reviewed and negative except for those discussed in HPI.       PHYSICAL EXAM    I have  reviewed the triage vital signs and nursing notes.    ED Triage Vitals   Temp Heart Rate Resp BP SpO2   -- 12/20/22 1525 12/20/22 1525 12/20/22 1528 12/20/22 1525    60 18 108/70 100 %      Temp src Heart Rate Source Patient Position BP Location FiO2 (%)   -- -- -- -- --              Physical Exam  GENERAL: Intubated orally, no spontaneous movements, acutely ill-appearing  SKIN: Warm, dry  HENT: Normocephalic, atraumatic  EYES: no scleral icterus  CV: regular rhythm, regular rate  RESPIRATORY: normal effort, lungs clear and equal bilaterally with ventilator  ABDOMEN: soft, nondistended  MUSCULOSKELETAL: no deformity  NEURO: Pinpoint pupils bilaterally, no spontaneous movements          LAB RESULTS  Recent Results (from the past 24 hour(s))   Comprehensive Metabolic Panel    Collection Time: 12/20/22  3:34 PM    Specimen: Blood   Result Value Ref Range    Glucose 168 (H) 65 - 99 mg/dL    BUN 27 (H) 8 - 23 mg/dL    Creatinine 1.64 (H) 0.76 - 1.27 mg/dL    Sodium 141 136 - 145 mmol/L    Potassium 4.5 3.5 - 5.2 mmol/L    Chloride 108 (H) 98 - 107 mmol/L    CO2 25.0 22.0 - 29.0 mmol/L    Calcium 8.6 8.6 - 10.5 mg/dL    Total Protein 5.9 (L) 6.0 - 8.5 g/dL    Albumin 3.30 (L) 3.50 - 5.20 g/dL    ALT (SGPT) 21 1 - 41 U/L    AST (SGOT) 34 1 - 40 U/L    Alkaline Phosphatase 58 39 - 117 U/L    Total Bilirubin 0.5 0.0 - 1.2 mg/dL    Globulin 2.6 gm/dL    A/G Ratio 1.3 g/dL    BUN/Creatinine Ratio 16.5 7.0 - 25.0    Anion Gap 8.0 5.0 - 15.0 mmol/L    eGFR 42.3 (L) >60.0 mL/min/1.73   Troponin    Collection Time: 12/20/22  3:34 PM    Specimen: Blood   Result Value Ref Range    Troponin T <0.010 0.000 - 0.030 ng/mL   BNP    Collection Time: 12/20/22  3:34 PM    Specimen: Blood   Result Value Ref Range    proBNP 77.4 0.0 - 1,800.0 pg/mL   CBC Auto Differential    Collection Time: 12/20/22  3:34 PM    Specimen: Blood   Result Value Ref Range    WBC 5.88 3.40 - 10.80 10*3/mm3    RBC 3.55 (L) 4.14 - 5.80 10*6/mm3    Hemoglobin 12.3  (L) 13.0 - 17.7 g/dL    Hematocrit 37.3 (L) 37.5 - 51.0 %    .1 (H) 79.0 - 97.0 fL    MCH 34.6 (H) 26.6 - 33.0 pg    MCHC 33.0 31.5 - 35.7 g/dL    RDW 12.9 12.3 - 15.4 %    RDW-SD 50.1 37.0 - 54.0 fl    MPV 9.5 6.0 - 12.0 fL    Platelets 171 140 - 450 10*3/mm3   Magnesium    Collection Time: 12/20/22  3:34 PM    Specimen: Blood   Result Value Ref Range    Magnesium 1.6 1.6 - 2.4 mg/dL   Manual Differential    Collection Time: 12/20/22  3:34 PM    Specimen: Blood   Result Value Ref Range    Neutrophil % 65.6 42.7 - 76.0 %    Lymphocyte % 24.7 19.6 - 45.3 %    Monocyte % 6.5 5.0 - 12.0 %    Eosinophil % 3.2 0.3 - 6.2 %    Neutrophils Absolute 3.86 1.70 - 7.00 10*3/mm3    Lymphocytes Absolute 1.45 0.70 - 3.10 10*3/mm3    Monocytes Absolute 0.38 0.10 - 0.90 10*3/mm3    Eosinophils Absolute 0.19 0.00 - 0.40 10*3/mm3    RBC Morphology Normal Normal    Smudge Cells Slight/1+ None Seen    Platelet Morphology Normal Normal   ECG 12 Lead Rhythm Change    Collection Time: 12/20/22  3:38 PM   Result Value Ref Range    QT Interval 500 ms   Blood Gas, Arterial -    Collection Time: 12/20/22  4:05 PM    Specimen: Arterial Blood   Result Value Ref Range    Site Arterial: right radial     Garfield's Test Positive     pH, Arterial 7.407 7.350 - 7.450 pH units    pCO2, Arterial 39.2 35.0 - 45.0 mm Hg    pO2, Arterial 200.8 (H) 80.0 - 100.0 mm Hg    HCO3, Arterial 24.7 22.0 - 28.0 mmol/L    Base Excess, Arterial 0.1 0.0 - 2.0 mmol/L    O2 Saturation Calculated 99.7 (H) 92.0 - 99.0 %    A-a DO2 0.3 mmHg    Barometric Pressure for Blood Gas 756.5 mmHg    Modality Adult Vent     FIO2 100 %    Ventilator Mode VC     Set Tidal Volume 600     Set Mech Resp Rate 18     Rate 18 Breaths/minute    PEEP 5        Ordered the above labs and independently reviewed the results.        RADIOLOGY  CT Head Without Contrast    Result Date: 12/20/2022  EMERGENCY NONCONTRAST HEAD CT 12/20/2022  CLINICAL HISTORY: Cardiac arrest, patient is intubated on  ventilator.  TECHNIQUE: Spiral CT images were obtained from the base of the skull to the vertex without intravenous contrast. The images were reformatted and submitted in 3 mm thick axial, sagittal and coronal CT sections with brain algorithm.  COMPARISON: There are no prior studies from [facility] for comparison.  FINDINGS: There is some mild patchy low-density in the periventricular white matter of the cerebral hemispheres consistent with mild small vessel disease. The remainder of the brain parenchyma is normal in attenuation. The ventricles are normal in size. I see no focal mass effect. There is no midline shift. No extra-axial fluid collections are identified and there is no evidence of acute intracranial hemorrhage. The calvarium and the skull base are normal in appearance. The paranasal sinuses and the mastoid air cells are clear. There is fluid and secretions in the nasopharyngeal airway and this patient is intubated and is a common finding in intubated patients.      1. No acute intracranial abnormality is identified. Other than mild small vessel disease in the periventricular white matter mild cerebral atrophy. Head CT is within normal limits. In patients with cardiac arrest, head CT immediately following cardiac arrest would be too early to see cerebral edema. If the patient has persistent neurologic symptoms follow-up CT or MRI of the brain could be obtained to further evaluate.      Radiation dose reduction techniques were utilized, including automated exposure control and exposure modulation based on body size.       XR Chest 1 View    Result Date: 12/20/2022  XR CHEST 1 VW-  12/20/2022  HISTORY: Intubation.  The endotracheal tube is seen with its tip in the right mainstem bronchus and should be retracted for better placement. Heart size is at the upper limits of normal for lungs are underinflated and there is some mild suspected atelectasis in the left lung base. Left base is  obscured by the cardioversion type device. No pneumothorax is seen.  Report was discussed with Dr. Saez  This report was finalized on 12/20/2022 3:46 PM by Dr. Bryn Elizabeth M.D.      CT Angiogram Chest    Result Date: 12/20/2022  CT ANGIOGRAM CHEST  HISTORY: Possible pulmonary embolism. Unknown D-dimer.  TECHNIQUE: CT angiogram of the chest performed using 100 mL of Isovue-370 contrast. Multiplanar and 3-dimensional reformatted images were produced at a separate workstation.  FINDINGS: There is an endotracheal tube positioned as expected. Contrast material is observed in the superior vena cava and passes caudally, flowing retrograde down the IVC into the right lobe hepatic veins. Configuration suggests poor cardiac output. The aorta is very faintly opacified and is normal in caliber without evidence of dissection. Central pulmonary arteries are nicely opacified, normal caliber, and enhance as expected. The more peripheral pulmonary arteries are poorly opacified in areas but no truncated filling defect or other lesion suggestive of embolism is present. Low lung volumes with parenchymal density and air bronchograms along the posterior lungs, most extensively in the left lower lobe is favored to represent atelectasis. No airway obstructing lesion.  There is no pleural or pericardial effusion. The bones appear normal.      Secondary evidence of poor cardiac output. Low lung volumes with dependent atelectasis. No evidence of central pulmonary embolism. I discussed the case with Dr. Saez at 5:11 PM.  Radiation dose reduction techniques were utilized, including automated exposure control and exposure modulation based on body size.         I ordered the above noted radiological studies. Reviewed by me and discussed with radiologist.  See dictation for official radiology interpretation.      PROCEDURES    Critical Care  Performed by: Archie Saez MD  Authorized by: Archie Saez MD     Critical care  provider statement:     Critical care time (minutes): 105.    Critical care time was exclusive of:  Separately billable procedures and treating other patients    Critical care was necessary to treat or prevent imminent or life-threatening deterioration of the following conditions:  Circulatory failure    Critical care was time spent personally by me on the following activities:  Development of treatment plan with patient or surrogate, discussions with consultants, evaluation of patient's response to treatment, examination of patient, obtaining history from patient or surrogate, ordering and performing treatments and interventions, ordering and review of laboratory studies, ordering and review of radiographic studies, pulse oximetry, re-evaluation of patient's condition and review of old charts          MEDICATIONS GIVEN IN ER    Medications   propofol (DIPRIVAN) infusion 10 mg/mL 100 mL (35 mcg/kg/min × 118 kg Intravenous Rate/Dose Change 12/20/22 2569)   sodium chloride 0.9 % flush 10 mL (has no administration in time range)   EPINEPHrine 5 mg in 250 mL NS infusion (0.02 mcg/kg/min × 118 kg Intravenous New Bag 12/20/22 7635)   piperacillin-tazobactam (ZOSYN) 3.375 g in iso-osmotic dextrose 50 ml (premix) (has no administration in time range)   piperacillin-tazobactam (ZOSYN) 3.375 g in iso-osmotic dextrose 50 ml (premix) (has no administration in time range)   Pharmacy to dose vancomycin (has no administration in time range)   sodium chloride 0.9 % infusion (has no administration in time range)   dextrose (GLUTOSE) oral gel 15 g (has no administration in time range)   dextrose (D50W) (25 g/50 mL) IV injection 25 g (has no administration in time range)   glucagon (human recombinant) (GLUCAGEN DIAGNOSTIC) injection 1 mg (has no administration in time range)   sodium chloride 0.9 % flush 10 mL (has no administration in time range)   sodium chloride 0.9 % flush 10 mL (has no administration in time range)   sodium  chloride 0.9 % infusion 40 mL (has no administration in time range)   insulin regular (humuLIN R,novoLIN R) injection 0-14 Units (has no administration in time range)   vancomycin 2250 mg/500 mL 0.9% NS IVPB ([facility]) (has no administration in time range)   atropine sulfate injection 0.5 mg (0.5 mg Intravenous Given 12/20/22 1536)   calcium gluconate 1g/50ml 0.675% NaCl IV SOLN (0 g Intravenous Stopped 12/20/22 1606)   iopamidol (ISOVUE-370) 76 % injection 100 mL (100 mL Intravenous Given by Other 12/20/22 1655)         PROGRESS, DATA ANALYSIS, CONSULTS, AND MEDICAL DECISION MAKING    All labs have been independently reviewed by me.  All radiology studies have been reviewed by me and discussed with radiologist dictating the report.   EKG's independently viewed and interpreted by me.  Discussion below represents my analysis of pertinent findings related to patient's condition, differential diagnosis, treatment plan and final disposition.    Differential diagnosis includes but is not limited to arrhythmia, hyperkalemia, STEMI, non-STEMI, PE, stroke, intracranial hemorrhage, hypoxia.    ED Course as of 12/20/22 1746   Tue Dec 20, 2022   1540 Speaking with Dr. Dubose with stroke neurology.  He recommends a CT of the head.  He states that if a CTA of the head can be performed at the same time a CTA of the chest, he would recommend doing both CTAs.  I spoke with the CT technologist and they can only do 1 CTA or the other.  I think that the PE is higher risk for him given his presentation.  He is next in line for the CTA. [TR]   1544 The patient is having sinus bradycardia.  His chest x-ray shows his ET tube is deep.  We are withdrawing the tube 5 cm.  We are repeating chest x-ray after this. [TR]   1547 I spoke with Dr. Elizabeth with radiology.  He reports that the ET tube is about 5 cm deep. [TR]   1547 The patient has persistent bradycardia after ET tube withdrawal.  Given his historical renal function, plan to give him some  IV calcium gluconate in case this is some hyperkalemia. [TR]   1549 I spoke with Dr. De La Cruz at the time of the patient's arrival by phone.  He reports the patient had an right lower extremity angiogram and angioplasty today.  He had placed a 6 Brazilian sheath.  He had finished the procedure and was using the Perclose system when the patient went unresponsive suddenly and became hypoxic.  He placed the sheath back in and the patient had assisted ventilations by bag for 45 seconds.  He required a chin thrust and got Narcan.  911 was called.  More Narcan was given and the patient lost pulse for about 1 minute.  Chest compressions were performed.  He does report the 6 Brazilian sheath was in an antegrade position.  The patient did receive heparin today.  Dr. De La Cruz was concerned given the acuteness of his mental status and respiratory change that a stroke or PE could have been a possibility. [TR]   1643 Speaking with Dr. Salmon with pulmonary ICU.  Will admit. [TR]   1653 The patient had another episode of cardiac arrest in the CAT scanner.  The nurse reports that he bradycardia down to the 30s and then had 2 very long pauses.  She felt a pulse and there was none.  A code was called.  He had a pulse upon my arrival.  I reviewed the CT head and CT chest images on the CT scanner and I did not see any intracranial hemorrhage or large PE.  He has moved back to bed 17 in the ER.  Pulmonary and vascular surgery at the bedside.  I did call out to interventional cardiology. [TR]   1659 Speak with Dr. Obregon with neuroradiology.  Head CT shows nothing acute [TR]   1701 EKG          EKG time: 1538  Rhythm/Rate: Bradycardia, rate 48  P waves and AZ: Normal P, normal AZ identified in most beats  QRS, axis: Narrow QRS, left axis  ST and T waves: No acute    Interpreted Contemporaneously by me, independently viewed  No prior EKG available for comparison   [TR]   1706 Speaking with Dr. Grande with interventional cardiology.  She agrees  with low-dose epi.  She does not feel the patient would benefit from a pacemaker or temporary wire at this time.  She will follow. [TR]   1710 Speaking with Dr. Mcdowell with radiology.  CTA shows no PE.  There is some retrograde flow suggesting poor cardiac output. [TR]      ED Course User Index  [TR] Archie Saez MD           PPE: The patient wore a mask and I wore an N95 mask throughout the entire patient encounter.       AS OF 17:46 EST VITALS:    BP - 128/79  HR - 73  TEMP - 97.8 °F (36.6 °C) (Tympanic)  O2 SATS - 100%        DIAGNOSIS  Final diagnoses:   Cardiac arrest (HCC)         DISPOSITION  ED Disposition     ED Disposition   Decision to Admit    Condition   --    Comment   Level of Care: Critical Care [6]   Diagnosis: Cardiac arrest (HCC) [427.5.ICD-9-CM]   Admitting Physician: MARRY MARINA [[id number]]   Attending Physician: MARRY MARINA [[id number]]   Certification: I Certify That Inpatient Hospital Services Are Medically Necessary For Greater Than 2 Midnights                  Note Disclaimer: At [facility], we believe that sharing information builds trust and better relationships. You are receiving this note because you recently visited [facility]. It is possible you will see health information before a provider has talked with you about it. This kind of information can be easy to misunderstand. To help you fully understand what it means for your health, we urge you to discuss this note with your provider.       Archie Saez MD  12/20/22 6043

## 2022-12-20 NOTE — CONSULTS
Name: Evangelist Garcia ADMIT: 2022   : 1945  PCP: Misa Garcia MD    MRN: [id number] LOS: 0 days   AGE/SEX: 77 y.o. male  ROOM: [facility]      Inpatient Vascular Surgery Consult  Consult performed by: Kane Earl MD  Consult ordered by: Archie Saez MD        CC: Cardiac arrest  Subjective     History of Present Illness  77 y.o. male he [facility] who suffered from cardiac arrest at the conclusion of the procedure.  We were asked specifically to help manage his right femoral access.    HPI Elements:  Location: Right femoral artery  Duration: Hours  Context: Intervention today  Associated Signs and Symptoms: No clear signs of distal malperfusion    Review of Systems   Unable to perform ROS: Intubated     For history unable to be completely obtained due to patient's inability and acuity of situation  History Review Reviewed Comments   Past Medical History:  [x]      Past Surgical History: [x]     Family History: [x]     Social History: [x]       Allergies: Nsaids      Objective   Temp:  [97.8 °F (36.6 °C)] 97.8 °F (36.6 °C)  Heart Rate:  [46-65] 46  Resp:  [18] 18  BP: ()/(64-86) 128/79  FiO2 (%):  [100 %] 100 %    I/O this shift:  In: 50 [IV Piggyback:50]  Out: -     Physical Exam  Vitals reviewed.   Constitutional:       Appearance: He is well-developed. He is obese. He is ill-appearing.      Interventions: He is sedated and intubated.   HENT:      Mouth/Throat:      Mouth: Mucous membranes are dry.   Eyes:      Conjunctiva/sclera: Conjunctivae normal.   Cardiovascular:      Rate and Rhythm: Normal rate.      Comments: Patient has a right femoral sheath in place.  This is antegrade access by report and appearance.  He has multiphasic signals at the PT and DP level bilaterally.  No significant cyanosis or evidence of distal malperfusion.  Pulmonary:      Effort: Pulmonary effort is normal. He  is intubated.   Abdominal:      Palpations: Abdomen is soft.      Tenderness: There is no abdominal tenderness.   Neurological:      Mental Status: He is alert and oriented to person, place, and time.       Data Reviewed:  CBC    Results from last 7 days   Lab Units 12/20/22  1534   WBC 10*3/mm3 5.88   HEMOGLOBIN g/dL 12.3*   PLATELETS 10*3/mm3 171     BMP   Results from last 7 days   Lab Units 12/20/22  1534   SODIUM mmol/L 141   POTASSIUM mmol/L 4.5   CHLORIDE mmol/L 108*   CO2 mmol/L 25.0   BUN mg/dL 27*   CREATININE mg/dL 1.64*   GLUCOSE mg/dL 168*   MAGNESIUM mg/dL 1.6     Coag     ABG    Results from last 7 days   Lab Units 12/20/22  1605   PH, ARTERIAL pH units 7.407   PCO2, ARTERIAL mm Hg 39.2   PO2 ART mm Hg 200.8*   BASE EXCESS ART mmol/L 0.1     Radiology(recent) CT Head Without Contrast    Result Date: 12/20/2022  1. No acute intracranial abnormality is identified. Other than mild small vessel disease in the periventricular white matter mild cerebral atrophy. Head CT is within normal limits. In patients with cardiac arrest, head CT immediately following cardiac arrest would be too early to see cerebral edema. If the patient has persistent neurologic symptoms follow-up CT or MRI of the brain could be obtained to further evaluate.      Radiation dose reduction techniques were utilized, including automated exposure control and exposure modulation based on body size.         Labs significant for: Hemoglobin 12.3.  Creatinine 1.6.    Imaging Studies:  CT scan of the chest has not yet been read by radiology but quick review does NOT show any large pulmonary embolus.      Active Hospital Problems    Diagnosis  POA   • **Cardiac arrest (HCC) [I46.9]  Yes      Resolved Hospital Problems   No resolved problems to display.       Data Points:  During this visit the following were done:  Labs Reviewed [x]    Labs Ordered []    Radiology Reports Reviewed [x]    Radiology Ordered []    EKG, echo, and/or stress test  reviewed []    Vascular lab results reviewed  []    Vascular lab images reviewed and interpreted per myself   []    Referring Provider Records Reviewed []    ER Records Reviewed []    Hospital Records Reviewed/Summarized [x]    History Obtained From Family []    Radiological images view and Interpreted per myself []    Case Discussed with referring provider []     Decision to obtain and request outside records  []    Total data points:4 or more    Active Hospital Problems:   Active Hospital Problems    Diagnosis  POA   • **Cardiac arrest (HCC) [I46.9]  Yes      Resolved Hospital Problems   No resolved problems to display.      Assessment & Plan   [place]  Assessment / Plan     77 y.o. male who suffered a cardiac event at the conclusion of a vascular intervention at a freestanding imaging facility.  The interventional radiologist caring for him had access to the right superficial femoral artery proximally via antegrade access pointing down the lower extremity for the intervention.  It sounds like the intervention was completed and then at the time of closure the patient arrested.  The access was planned to be closed with a suture mediated device but this was aborted when the patient arrested.  The sheath was sutured in place. The patient was intubated in the field and then arrested here again while in the CT scanner.    Etiology of the arrest is being evaluated by the emergency department and critical care team.    In the emergency department team spoke with the interventional list, he explained that this was antegrade access and for this reason we were asked to see the patient in consultation.  The patient is obese and has a 6 Finnish sheath pointing down the leg.  Removal with manual pressure alone would have significant risk for bleeding and certainly not appropriate at this current time as the patient has just suffered cardiac arrest x2.  Would not want to introduce another potential problem at this time.   He does not have any obvious malperfusion of either extremity distally.  His pulses are nonpalpable but he does have multiphasic signals at the AT and PT level bilaterally even with the right-sided sheath in place.  Discussed with interventional list.  The sheath is in the proximal SFA which was reported to be relatively disease-free on angiogram.    While the sheath is in place would recommend continuous infusion to keep the sheath patent and decrease risk of thrombotic related complications. If patient has any additional indications for systemic anticoagulation, this could further decrease risks.When patient stable, will plan to removed sheath in the hybrid OR with a closure device.    I discussed the patients findings and my recommendations with primary care team and consulting provider.  Please call my office with any question: [phone]

## 2022-12-20 NOTE — H&P
H&P NOTE    Patient Identification:  Evangelist Garcia  77 y.o.  male  1945  [id number]              CC: Cardiac arrest    History of Present Illness:  77-year-old gentleman with known history of diabetes mellitus peripheral vascular disease colon cancer 2019 apparently had episode of cardiac arrest at a vascular center undergoing an outpatient procedure.  According to the vascular surgeon who I spoke to reported that patient was having a right lower extremity angiogram for concerns of peripheral vascular disease/claudication.  This procedure was being performed under conscious sedation.  Patient was apparently talking prior to having episode of apnea followed by asystole.  CPR was performed and per vascular surgeon patient was resuscitated within a minute.  EMS was called in and intubation performed per EMS.  Patient has a arterial line in his right groin.  Also has history of stage III chronic kidney disease bifascicular block.  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  Currently patient on the vent unable to give me any meaningful history.  He had another episode of sinus bradycardia and a code was called in the CT scanner and currently is requiring epinephrine drip.  He is tachycardic heart rate in the 40s but maintaining blood pressure on the epinephrine drip.  I reviewed repeat EKG and it showed bradycardia with possible DC prolongation Wenckebach.  I called and discussed with cardiology Dr. hartman and she will come and review EKG and reviewed with the patient at bedside.  Discussed with patient's family and they wish full support.  Discussed with Dr. Anders Earl and at this time he wishes to leave the femoral A-line in place.    Review of Systems  Able to get with patient's current condition  Past Medical History:  No past medical history on file.  Diabetes mellitus, peripheral vascular disease history of DVT chronic kidney disease bifascicular block  Past  Surgical History:  No past surgical history on file.     Home Meds:  (Not in a hospital admission)  Reviewed from [facility]    Allergies:  Allergies   Allergen Reactions   • Nsaids Other (See Comments)     High cr  High cr         Social History:   Social History     Socioeconomic History   • Marital status:        Family History:  No family history on file.    Physical Exam:  /79   Pulse (!) 46   Temp 97.8 °F (36.6 °C) (Tympanic)   Resp 18   Ht 177.8 cm (70\")   Wt 118 kg (259 lb 14.4 oz)   SpO2 100%   BMI 37.29 kg/m²  Body mass index is 37.29 kg/m². 100% 118 kg (259 lb 14.4 oz)  Physical Exam  Patient is examined using the personal protective equipment as per guidelines from infection control for this particular patient as enacted.  Hand hygiene was performed before and after patient interaction.  Elderly gentleman on the vent intubated sedated on epinephrine drip.  Eyes normal conjunctivae and pupils reactive to light  ENT ET tube good position orally  Neck midline trachea no thyromegaly  Chest equal breath sounds on the vent diminished bilaterally in the bases  CVS regular rate and rhythm no lower extremity edema  Abdomen no masses felt no hepatosplenomegaly  CNS sedated not following commands  Skin no rashes no nodules  Psych sedated intubated on the vent  Musculoskeletal no cyanosis no clubbing normal range of motion        LABS:  Lab Results   Component Value Date    CALCIUM 8.6 12/20/2022    PHOS 3.5 11/29/2022     Results from last 7 days   Lab Units 12/20/22  1534   MAGNESIUM mg/dL 1.6   SODIUM mmol/L 141   POTASSIUM mmol/L 4.5   CHLORIDE mmol/L 108*   CO2 mmol/L 25.0   BUN mg/dL 27*   CREATININE mg/dL 1.64*   GLUCOSE mg/dL 168*   CALCIUM mg/dL 8.6   WBC 10*3/mm3 5.88   HEMOGLOBIN g/dL 12.3*   PLATELETS 10*3/mm3 171   ALT (SGPT) U/L 21   AST (SGOT) U/L 34   PROBNP pg/mL 77.4     Lab Results   Component Value Date    CKTOTAL 39 05/15/2020    TROPONINI 0.064 (H) 12/01/2019    TROPONINT  <0.010 12/20/2022     Results from last 7 days   Lab Units 12/20/22  1534   TROPONIN T ng/mL <0.010             Results from last 7 days   Lab Units 12/20/22  1605   PH, ARTERIAL pH units 7.407   PCO2, ARTERIAL mm Hg 39.2   PO2 ART mm Hg 200.8*   MODALITY  Adult Vent   O2 SATURATION CALC % 99.7*                 Lab Results   Component Value Date    TSH 1.170 09/01/2021     Estimated Creatinine Clearance: 47 mL/min (A) (by C-G formula based on SCr of 1.64 mg/dL (H)).         Imaging: I personally visualized the images of scans/x-rays performed within last 3 days.      Assessment:  Cardiac arrest (HCC)  Asystole cardiac arrest  Bradycardia  Acute respiratory failure  Bibasilar consolidation  Peripheral vascular disease  Acute kidney disease on CKD  Diabetes mellitus  History of DVT on Eliquis  DONOVAN on CPAP      Recommendations:  At this time we have a elderly gentleman multiple medical problems and issues as listed above.  Asystole cardiac arrest etiology unclear.  CT head essentially negative for acute changes.  CT angiogram no acute PE but bibasilar consolidation noted  Continue current vent support.  ABGs reviewed currently compensated on the vent  Discussed with cardiology they will come and evaluate the patient.  He may need pacemaker.  Epinephrine drip to be continued until evaluated by cardiology.  Initiate broad-spectrum antibiotic to cover for pneumonia Zosyn  Vascular input noted plans noted per vascular surgery to leave the right femoral A-line in place.  Monitor creatinine closely we will initiate some IV fluids  History of DVT on Eliquis.  Will hold off anticoagulation for now in case plans for any procedures.  We will check coags  ICU core measures  Discussed with family updated patient's current clinical condition  Discussed with cardiology discussed with vascular surgery.    Critical care time 75 minutes      High discussed with cardiology  and she reviewed the patient all information.  She also  had Dr. Elias review EKG etc.  At this time patient currently in sinus rhythm on epinephrine drip.  They want to maintain the epi drip for now.  Plan for repeat echo in the morning.        Jose Salmon MD  12/20/2022  17:06 EST      Much of this encounter note is an electronic transcription/translation of spoken language to printed text using Dragon Software.

## 2022-12-20 NOTE — ED NOTES
While in CT scan finishing up with scan, pt began to become bradycardic with HR dropping to 32-30 with 2 pauses. Carotid pulse not felt, CPR started. Compressions lasted approx 1 minute when pt began moving. ROSC retrieved, pulse felt by MD Saez at bedside.

## 2022-12-21 ENCOUNTER — APPOINTMENT (OUTPATIENT)
Dept: GENERAL RADIOLOGY | Facility: HOSPITAL | Age: 77
DRG: 252 | End: 2022-12-21
Payer: MEDICARE

## 2022-12-21 ENCOUNTER — ANESTHESIA EVENT (OUTPATIENT)
Dept: PERIOP | Facility: HOSPITAL | Age: 77
DRG: 252 | End: 2022-12-21
Payer: MEDICARE

## 2022-12-21 ENCOUNTER — APPOINTMENT (OUTPATIENT)
Dept: CARDIOLOGY | Facility: HOSPITAL | Age: 77
DRG: 252 | End: 2022-12-21
Payer: MEDICARE

## 2022-12-21 ENCOUNTER — ANESTHESIA (OUTPATIENT)
Dept: PERIOP | Facility: HOSPITAL | Age: 77
DRG: 252 | End: 2022-12-21
Payer: MEDICARE

## 2022-12-21 LAB
ANION GAP SERPL CALCULATED.3IONS-SCNC: 17 MMOL/L (ref 5–15)
AORTIC DIMENSIONLESS INDEX: 0.9 (DI)
APTT PPP: 35.3 SECONDS (ref 22.7–35.4)
ARTERIAL PATENCY WRIST A: ABNORMAL
ARTERIAL PATENCY WRIST A: ABNORMAL
ATMOSPHERIC PRESS: 754.5 MMHG
ATMOSPHERIC PRESS: 759.7 MMHG
BASE EXCESS BLDA CALC-SCNC: -10 MMOL/L (ref 0–2)
BASE EXCESS BLDA CALC-SCNC: -2 MMOL/L (ref 0–2)
BASOPHILS # BLD AUTO: 0.01 10*3/MM3 (ref 0–0.2)
BASOPHILS NFR BLD AUTO: 0.1 % (ref 0–1.5)
BDY SITE: ABNORMAL
BDY SITE: ABNORMAL
BH CV ECHO MEAS - ACS: 1.75 CM
BH CV ECHO MEAS - AO MAX PG: 10.1 MMHG
BH CV ECHO MEAS - AO MEAN PG: 5.3 MMHG
BH CV ECHO MEAS - AO ROOT DIAM: 3.4 CM
BH CV ECHO MEAS - AO V2 MAX: 158.8 CM/SEC
BH CV ECHO MEAS - AO V2 VTI: 24.7 CM
BH CV ECHO MEAS - AVA(I,D): 3.4 CM2
BH CV ECHO MEAS - EDV(CUBED): 35.4 ML
BH CV ECHO MEAS - EDV(MOD-SP2): 82 ML
BH CV ECHO MEAS - EDV(MOD-SP4): 72 ML
BH CV ECHO MEAS - EF(MOD-BP): 72 %
BH CV ECHO MEAS - EF(MOD-SP2): 70.7 %
BH CV ECHO MEAS - EF(MOD-SP4): 69.4 %
BH CV ECHO MEAS - ESV(CUBED): 10.4 ML
BH CV ECHO MEAS - ESV(MOD-SP2): 24 ML
BH CV ECHO MEAS - ESV(MOD-SP4): 22 ML
BH CV ECHO MEAS - FS: 33.4 %
BH CV ECHO MEAS - IVS/LVPW: 1.11 CM
BH CV ECHO MEAS - IVSD: 1.29 CM
BH CV ECHO MEAS - LAT PEAK E' VEL: 8.1 CM/SEC
BH CV ECHO MEAS - LV MASS(C)D: 128.4 GRAMS
BH CV ECHO MEAS - LV MAX PG: 6.2 MMHG
BH CV ECHO MEAS - LV MEAN PG: 3.7 MMHG
BH CV ECHO MEAS - LV V1 MAX: 124.3 CM/SEC
BH CV ECHO MEAS - LV V1 VTI: 22.7 CM
BH CV ECHO MEAS - LVIDD: 3.3 CM
BH CV ECHO MEAS - LVIDS: 1.9 CM
BH CV ECHO MEAS - LVOT AREA: 3.7 CM2
BH CV ECHO MEAS - LVOT DIAM: 2.17 CM
BH CV ECHO MEAS - LVPWD: 1.16 CM
BH CV ECHO MEAS - MED PEAK E' VEL: 6.3 CM/SEC
BH CV ECHO MEAS - MV A MAX VEL: 127.3 CM/SEC
BH CV ECHO MEAS - MV DEC SLOPE: 514.3 CM/SEC2
BH CV ECHO MEAS - MV DEC TIME: 214 MSEC
BH CV ECHO MEAS - MV E MAX VEL: 91.7 CM/SEC
BH CV ECHO MEAS - MV E/A: 0.72
BH CV ECHO MEAS - MV MAX PG: 7.5 MMHG
BH CV ECHO MEAS - MV MEAN PG: 3.9 MMHG
BH CV ECHO MEAS - MV P1/2T: 70.4 MSEC
BH CV ECHO MEAS - MV V2 VTI: 25.2 CM
BH CV ECHO MEAS - MVA(P1/2T): 3.1 CM2
BH CV ECHO MEAS - MVA(VTI): 3.3 CM2
BH CV ECHO MEAS - PA ACC TIME: 0.07 SEC
BH CV ECHO MEAS - PA PR(ACCEL): 45.7 MMHG
BH CV ECHO MEAS - PA V2 MAX: 95 CM/SEC
BH CV ECHO MEAS - PULM A REVS DUR: 0.13 SEC
BH CV ECHO MEAS - PULM A REVS VEL: 29.1 CM/SEC
BH CV ECHO MEAS - PULM DIAS VEL: 34.7 CM/SEC
BH CV ECHO MEAS - PULM S/D: 0.95
BH CV ECHO MEAS - PULM SYS VEL: 33 CM/SEC
BH CV ECHO MEAS - RV MAX PG: 1.54 MMHG
BH CV ECHO MEAS - RV V1 MAX: 62.1 CM/SEC
BH CV ECHO MEAS - RV V1 VTI: 12.1 CM
BH CV ECHO MEAS - SV(LVOT): 83.9 ML
BH CV ECHO MEAS - SV(MOD-SP2): 58 ML
BH CV ECHO MEAS - SV(MOD-SP4): 50 ML
BH CV ECHO MEAS - TAPSE (>1.6): 1.4 CM
BH CV ECHO MEASUREMENTS AVERAGE E/E' RATIO: 12.74
BH CV XLRA - RV BASE: 2.6 CM
BH CV XLRA - RV LENGTH: 6.5 CM
BH CV XLRA - RV MID: 3.2 CM
BH CV XLRA - TDI S': 12.1 CM/SEC
BUN SERPL-MCNC: 24 MG/DL (ref 8–23)
BUN/CREAT SERPL: 12.9 (ref 7–25)
CALCIUM SPEC-SCNC: 8.6 MG/DL (ref 8.6–10.5)
CHLORIDE SERPL-SCNC: 106 MMOL/L (ref 98–107)
CK SERPL-CCNC: 86 U/L (ref 20–200)
CO2 SERPL-SCNC: 17 MMOL/L (ref 22–29)
CREAT SERPL-MCNC: 1.86 MG/DL (ref 0.76–1.27)
D-LACTATE SERPL-SCNC: 1.1 MMOL/L (ref 0.5–2)
D-LACTATE SERPL-SCNC: 1.9 MMOL/L (ref 0.5–2)
D-LACTATE SERPL-SCNC: 9.4 MMOL/L (ref 0.5–2)
DEPRECATED RDW RBC AUTO: 47.4 FL (ref 37–54)
EGFRCR SERPLBLD CKD-EPI 2021: 36.4 ML/MIN/1.73
EOSINOPHIL # BLD AUTO: 0.01 10*3/MM3 (ref 0–0.4)
EOSINOPHIL NFR BLD AUTO: 0.1 % (ref 0.3–6.2)
ERYTHROCYTE [DISTWIDTH] IN BLOOD BY AUTOMATED COUNT: 12.4 % (ref 12.3–15.4)
GLUCOSE BLDC GLUCOMTR-MCNC: 142 MG/DL (ref 70–130)
GLUCOSE BLDC GLUCOMTR-MCNC: 145 MG/DL (ref 70–130)
GLUCOSE BLDC GLUCOMTR-MCNC: 251 MG/DL (ref 70–130)
GLUCOSE BLDC GLUCOMTR-MCNC: 342 MG/DL (ref 70–130)
GLUCOSE SERPL-MCNC: 375 MG/DL (ref 65–99)
HCO3 BLDA-SCNC: 16.9 MMOL/L (ref 22–28)
HCO3 BLDA-SCNC: 25.3 MMOL/L (ref 22–28)
HCT VFR BLD AUTO: 36.4 % (ref 37.5–51)
HGB BLD-MCNC: 12.4 G/DL (ref 13–17.7)
IMM GRANULOCYTES # BLD AUTO: 0.07 10*3/MM3 (ref 0–0.05)
IMM GRANULOCYTES NFR BLD AUTO: 0.5 % (ref 0–0.5)
INHALED O2 CONCENTRATION: 50 %
INHALED O2 CONCENTRATION: 50 %
LEFT ATRIUM VOLUME INDEX: 14.8 ML/M2
LYMPHOCYTES # BLD AUTO: 0.59 10*3/MM3 (ref 0.7–3.1)
LYMPHOCYTES NFR BLD AUTO: 4.6 % (ref 19.6–45.3)
MAXIMAL PREDICTED HEART RATE: 141 BPM
MCH RBC QN AUTO: 34.8 PG (ref 26.6–33)
MCHC RBC AUTO-ENTMCNC: 34.1 G/DL (ref 31.5–35.7)
MCV RBC AUTO: 102.2 FL (ref 79–97)
MODALITY: ABNORMAL
MODALITY: ABNORMAL
MONOCYTES # BLD AUTO: 1.24 10*3/MM3 (ref 0.1–0.9)
MONOCYTES NFR BLD AUTO: 9.6 % (ref 5–12)
NEUTROPHILS NFR BLD AUTO: 11.04 10*3/MM3 (ref 1.7–7)
NEUTROPHILS NFR BLD AUTO: 85.1 % (ref 42.7–76)
NRBC BLD AUTO-RTO: 0 /100 WBC (ref 0–0.2)
O2 A-A PPRESDIFF RESPIRATORY: 0.2 MMHG
O2 A-A PPRESDIFF RESPIRATORY: 0.2 MMHG
PCO2 BLDA: 40.2 MM HG (ref 35–45)
PCO2 BLDA: 53.4 MM HG (ref 35–45)
PEEP RESPIRATORY: 5 CM[H2O]
PEEP RESPIRATORY: 5 CM[H2O]
PH BLDA: 7.23 PH UNITS (ref 7.35–7.45)
PH BLDA: 7.28 PH UNITS (ref 7.35–7.45)
PLATELET # BLD AUTO: 190 10*3/MM3 (ref 140–450)
PMV BLD AUTO: 9.2 FL (ref 6–12)
PO2 BLDA: 70.7 MM HG (ref 80–100)
PO2 BLDA: 76.2 MM HG (ref 80–100)
POTASSIUM SERPL-SCNC: 3.5 MMOL/L (ref 3.5–5.2)
QT INTERVAL: 374 MS
RBC # BLD AUTO: 3.56 10*6/MM3 (ref 4.14–5.8)
SAO2 % BLDCOA: 90.7 % (ref 92–99)
SAO2 % BLDCOA: 93 % (ref 92–99)
SET MECH RESP RATE: 10
SET MECH RESP RATE: 18
SODIUM SERPL-SCNC: 140 MMOL/L (ref 136–145)
STRESS TARGET HR: 120 BPM
TOTAL RATE: 12 BREATHS/MINUTE
TOTAL RATE: 18 BREATHS/MINUTE
VENTILATOR MODE: ABNORMAL
VENTILATOR MODE: AC
VT ON VENT VENT: 542 ML
VT ON VENT VENT: 600 ML
WBC NRBC COR # BLD: 12.96 10*3/MM3 (ref 3.4–10.8)

## 2022-12-21 PROCEDURE — 83605 ASSAY OF LACTIC ACID: CPT | Performed by: INTERNAL MEDICINE

## 2022-12-21 PROCEDURE — 71045 X-RAY EXAM CHEST 1 VIEW: CPT

## 2022-12-21 PROCEDURE — 25010000002 VANCOMYCIN 1 G RECONSTITUTED SOLUTION

## 2022-12-21 PROCEDURE — 63710000001 INSULIN REGULAR HUMAN PER 5 UNITS: Performed by: INTERNAL MEDICINE

## 2022-12-21 PROCEDURE — 93306 TTE W/DOPPLER COMPLETE: CPT | Performed by: INTERNAL MEDICINE

## 2022-12-21 PROCEDURE — 82550 ASSAY OF CK (CPK): CPT | Performed by: INTERNAL MEDICINE

## 2022-12-21 PROCEDURE — 94799 UNLISTED PULMONARY SVC/PX: CPT

## 2022-12-21 PROCEDURE — 25010000002 PROPOFOL 10 MG/ML EMULSION: Performed by: NURSE ANESTHETIST, CERTIFIED REGISTERED

## 2022-12-21 PROCEDURE — C1769 GUIDE WIRE: HCPCS | Performed by: STUDENT IN AN ORGANIZED HEALTH CARE EDUCATION/TRAINING PROGRAM

## 2022-12-21 PROCEDURE — 25010000002 HEPARIN (PORCINE) PER 1000 UNITS: Performed by: STUDENT IN AN ORGANIZED HEALTH CARE EDUCATION/TRAINING PROGRAM

## 2022-12-21 PROCEDURE — C1760 CLOSURE DEV, VASC: HCPCS | Performed by: STUDENT IN AN ORGANIZED HEALTH CARE EDUCATION/TRAINING PROGRAM

## 2022-12-21 PROCEDURE — 94761 N-INVAS EAR/PLS OXIMETRY MLT: CPT

## 2022-12-21 PROCEDURE — 25010000002 FENTANYL CITRATE (PF) 50 MCG/ML SOLUTION: Performed by: NURSE ANESTHETIST, CERTIFIED REGISTERED

## 2022-12-21 PROCEDURE — 25010000002 HEPARIN (PORCINE) PER 1000 UNITS: Performed by: NURSE ANESTHETIST, CERTIFIED REGISTERED

## 2022-12-21 PROCEDURE — C1894 INTRO/SHEATH, NON-LASER: HCPCS | Performed by: STUDENT IN AN ORGANIZED HEALTH CARE EDUCATION/TRAINING PROGRAM

## 2022-12-21 PROCEDURE — 25010000002 PROPOFOL 10 MG/ML EMULSION: Performed by: STUDENT IN AN ORGANIZED HEALTH CARE EDUCATION/TRAINING PROGRAM

## 2022-12-21 PROCEDURE — 80048 BASIC METABOLIC PNL TOTAL CA: CPT | Performed by: INTERNAL MEDICINE

## 2022-12-21 PROCEDURE — 02HV33Z INSERTION OF INFUSION DEVICE INTO SUPERIOR VENA CAVA, PERCUTANEOUS APPROACH: ICD-10-PCS | Performed by: INTERNAL MEDICINE

## 2022-12-21 PROCEDURE — 82803 BLOOD GASES ANY COMBINATION: CPT

## 2022-12-21 PROCEDURE — 85347 COAGULATION TIME ACTIVATED: CPT

## 2022-12-21 PROCEDURE — 82962 GLUCOSE BLOOD TEST: CPT

## 2022-12-21 PROCEDURE — 25010000002 PROTAMINE SULFATE PER 10 MG: Performed by: NURSE ANESTHETIST, CERTIFIED REGISTERED

## 2022-12-21 PROCEDURE — 85025 COMPLETE CBC W/AUTO DIFF WBC: CPT | Performed by: INTERNAL MEDICINE

## 2022-12-21 PROCEDURE — 25010000002 PIPERACILLIN SOD-TAZOBACTAM PER 1 G: Performed by: INTERNAL MEDICINE

## 2022-12-21 PROCEDURE — 25010000002 PROPOFOL 10 MG/ML EMULSION: Performed by: INTERNAL MEDICINE

## 2022-12-21 PROCEDURE — 93306 TTE W/DOPPLER COMPLETE: CPT

## 2022-12-21 PROCEDURE — 25010000002 PIPERACILLIN SOD-TAZOBACTAM PER 1 G: Performed by: STUDENT IN AN ORGANIZED HEALTH CARE EDUCATION/TRAINING PROGRAM

## 2022-12-21 PROCEDURE — 99232 SBSQ HOSP IP/OBS MODERATE 35: CPT | Performed by: INTERNAL MEDICINE

## 2022-12-21 PROCEDURE — 93010 ELECTROCARDIOGRAM REPORT: CPT | Performed by: INTERNAL MEDICINE

## 2022-12-21 PROCEDURE — 93005 ELECTROCARDIOGRAM TRACING: CPT | Performed by: INTERNAL MEDICINE

## 2022-12-21 PROCEDURE — 25010000002 PERFLUTREN (DEFINITY) 8.476 MG IN SODIUM CHLORIDE (PF) 0.9 % 10 ML INJECTION: Performed by: INTERNAL MEDICINE

## 2022-12-21 PROCEDURE — 94760 N-INVAS EAR/PLS OXIMETRY 1: CPT

## 2022-12-21 PROCEDURE — 0 IOPAMIDOL PER 1 ML: Performed by: STUDENT IN AN ORGANIZED HEALTH CARE EDUCATION/TRAINING PROGRAM

## 2022-12-21 PROCEDURE — 25010000002 EPINEPHRINE 1 MG/ML SOLUTION 30 ML VIAL: Performed by: INTERNAL MEDICINE

## 2022-12-21 PROCEDURE — B548ZZA ULTRASONOGRAPHY OF SUPERIOR VENA CAVA, GUIDANCE: ICD-10-PCS | Performed by: INTERNAL MEDICINE

## 2022-12-21 PROCEDURE — 04QK0ZZ REPAIR RIGHT FEMORAL ARTERY, OPEN APPROACH: ICD-10-PCS | Performed by: STUDENT IN AN ORGANIZED HEALTH CARE EDUCATION/TRAINING PROGRAM

## 2022-12-21 PROCEDURE — 85730 THROMBOPLASTIN TIME PARTIAL: CPT | Performed by: SURGERY

## 2022-12-21 PROCEDURE — B41F1ZZ FLUOROSCOPY OF RIGHT LOWER EXTREMITY ARTERIES USING LOW OSMOLAR CONTRAST: ICD-10-PCS | Performed by: STUDENT IN AN ORGANIZED HEALTH CARE EDUCATION/TRAINING PROGRAM

## 2022-12-21 PROCEDURE — 25010000002 HEPARIN (PORCINE) 25000-0.45 UT/250ML-% SOLUTION: Performed by: SURGERY

## 2022-12-21 PROCEDURE — 25010000002 PHENYLEPHRINE 10 MG/ML SOLUTION 5 ML VIAL: Performed by: NURSE ANESTHETIST, CERTIFIED REGISTERED

## 2022-12-21 DEVICE — LIGACLIP MCA MULTIPLE CLIP APPLIERS, 20 SMALL CLIPS
Type: IMPLANTABLE DEVICE | Site: LEG | Status: FUNCTIONAL
Brand: LIGACLIP

## 2022-12-21 DEVICE — ABSORBABLE HEMOSTAT (OXIDIZED REGENERATED CELLULOSE, U.S.P.)
Type: IMPLANTABLE DEVICE | Site: LEG | Status: FUNCTIONAL
Brand: SURGICEL FIBRILLAR

## 2022-12-21 DEVICE — LIGACLIP MCA MULTIPLE CLIP APPLIERS, 20 MEDIUM CLIPS
Type: IMPLANTABLE DEVICE | Site: LEG | Status: FUNCTIONAL
Brand: LIGACLIP

## 2022-12-21 RX ORDER — PHENYLEPHRINE HCL IN 0.9% NACL 0.5 MG/5ML
.5-3 SYRINGE (ML) INTRAVENOUS
Status: DISCONTINUED | OUTPATIENT
Start: 2022-12-21 | End: 2022-12-22

## 2022-12-21 RX ORDER — FENTANYL CITRATE 50 UG/ML
INJECTION, SOLUTION INTRAMUSCULAR; INTRAVENOUS AS NEEDED
Status: DISCONTINUED | OUTPATIENT
Start: 2022-12-21 | End: 2022-12-21 | Stop reason: SURG

## 2022-12-21 RX ORDER — PROTAMINE SULFATE 10 MG/ML
INJECTION, SOLUTION INTRAVENOUS AS NEEDED
Status: DISCONTINUED | OUTPATIENT
Start: 2022-12-21 | End: 2022-12-21 | Stop reason: SURG

## 2022-12-21 RX ORDER — HYDROMORPHONE HYDROCHLORIDE 1 MG/ML
0.5 INJECTION, SOLUTION INTRAMUSCULAR; INTRAVENOUS; SUBCUTANEOUS
Status: DISCONTINUED | OUTPATIENT
Start: 2022-12-21 | End: 2022-12-21 | Stop reason: HOSPADM

## 2022-12-21 RX ORDER — CHLORAL HYDRATE 500 MG
1000 CAPSULE ORAL
COMMUNITY

## 2022-12-21 RX ORDER — INSULIN LISPRO 100 [IU]/ML
16 INJECTION, SOLUTION INTRAVENOUS; SUBCUTANEOUS
COMMUNITY

## 2022-12-21 RX ORDER — ONDANSETRON 2 MG/ML
4 INJECTION INTRAMUSCULAR; INTRAVENOUS ONCE AS NEEDED
Status: DISCONTINUED | OUTPATIENT
Start: 2022-12-21 | End: 2022-12-21 | Stop reason: HOSPADM

## 2022-12-21 RX ORDER — NALOXONE HCL 0.4 MG/ML
0.2 VIAL (ML) INJECTION AS NEEDED
Status: DISCONTINUED | OUTPATIENT
Start: 2022-12-21 | End: 2022-12-21 | Stop reason: HOSPADM

## 2022-12-21 RX ORDER — ROCURONIUM BROMIDE 10 MG/ML
INJECTION, SOLUTION INTRAVENOUS AS NEEDED
Status: DISCONTINUED | OUTPATIENT
Start: 2022-12-21 | End: 2022-12-21 | Stop reason: SURG

## 2022-12-21 RX ORDER — BUPIVACAINE HCL/0.9 % NACL/PF 0.125 %
PLASTIC BAG, INJECTION (ML) EPIDURAL AS NEEDED
Status: DISCONTINUED | OUTPATIENT
Start: 2022-12-21 | End: 2022-12-21 | Stop reason: SURG

## 2022-12-21 RX ORDER — SODIUM BICARBONATE 325 MG/1
650 TABLET ORAL 3 TIMES DAILY
COMMUNITY

## 2022-12-21 RX ORDER — DIPHENHYDRAMINE HYDROCHLORIDE 50 MG/ML
12.5 INJECTION INTRAMUSCULAR; INTRAVENOUS
Status: DISCONTINUED | OUTPATIENT
Start: 2022-12-21 | End: 2022-12-21 | Stop reason: HOSPADM

## 2022-12-21 RX ORDER — FLUMAZENIL 0.1 MG/ML
0.2 INJECTION INTRAVENOUS AS NEEDED
Status: DISCONTINUED | OUTPATIENT
Start: 2022-12-21 | End: 2022-12-21 | Stop reason: HOSPADM

## 2022-12-21 RX ORDER — CHOLECALCIFEROL (VITAMIN D3) 125 MCG
500 CAPSULE ORAL DAILY
COMMUNITY

## 2022-12-21 RX ORDER — HYDROCODONE BITARTRATE AND ACETAMINOPHEN 7.5; 325 MG/1; MG/1
1 TABLET ORAL ONCE AS NEEDED
Status: DISCONTINUED | OUTPATIENT
Start: 2022-12-21 | End: 2022-12-21 | Stop reason: HOSPADM

## 2022-12-21 RX ORDER — UREA 10 %
3 LOTION (ML) TOPICAL NIGHTLY
COMMUNITY

## 2022-12-21 RX ORDER — INSULIN GLARGINE 100 [IU]/ML
30 INJECTION, SOLUTION SUBCUTANEOUS NIGHTLY
COMMUNITY

## 2022-12-21 RX ORDER — LEVOTHYROXINE SODIUM 0.1 MG/1
100 TABLET ORAL DAILY
COMMUNITY

## 2022-12-21 RX ORDER — MAGNESIUM HYDROXIDE 1200 MG/15ML
LIQUID ORAL AS NEEDED
Status: DISCONTINUED | OUTPATIENT
Start: 2022-12-21 | End: 2022-12-21 | Stop reason: HOSPADM

## 2022-12-21 RX ORDER — MONTELUKAST SODIUM 10 MG/1
10 TABLET ORAL NIGHTLY
COMMUNITY

## 2022-12-21 RX ORDER — PROMETHAZINE HYDROCHLORIDE 25 MG/1
25 SUPPOSITORY RECTAL ONCE AS NEEDED
Status: DISCONTINUED | OUTPATIENT
Start: 2022-12-21 | End: 2022-12-21 | Stop reason: HOSPADM

## 2022-12-21 RX ORDER — METOPROLOL SUCCINATE 25 MG/1
25 TABLET, EXTENDED RELEASE ORAL DAILY
COMMUNITY
End: 2022-12-28 | Stop reason: HOSPADM

## 2022-12-21 RX ORDER — DIPHENHYDRAMINE HCL 25 MG
25 CAPSULE ORAL
Status: DISCONTINUED | OUTPATIENT
Start: 2022-12-21 | End: 2022-12-21 | Stop reason: HOSPADM

## 2022-12-21 RX ORDER — FUROSEMIDE 20 MG/1
20 TABLET ORAL DAILY
COMMUNITY
End: 2022-12-28 | Stop reason: HOSPADM

## 2022-12-21 RX ORDER — FAMOTIDINE 20 MG/1
20 TABLET, FILM COATED ORAL DAILY
COMMUNITY

## 2022-12-21 RX ORDER — TAMSULOSIN HYDROCHLORIDE 0.4 MG/1
1 CAPSULE ORAL DAILY
COMMUNITY

## 2022-12-21 RX ORDER — ERGOCALCIFEROL 1.25 MG/1
50000 CAPSULE ORAL WEEKLY
COMMUNITY

## 2022-12-21 RX ORDER — HYDRALAZINE HYDROCHLORIDE 20 MG/ML
5 INJECTION INTRAMUSCULAR; INTRAVENOUS
Status: DISCONTINUED | OUTPATIENT
Start: 2022-12-21 | End: 2022-12-21 | Stop reason: HOSPADM

## 2022-12-21 RX ORDER — FENTANYL CITRATE 50 UG/ML
50 INJECTION, SOLUTION INTRAMUSCULAR; INTRAVENOUS
Status: DISCONTINUED | OUTPATIENT
Start: 2022-12-21 | End: 2022-12-21 | Stop reason: HOSPADM

## 2022-12-21 RX ORDER — SIMVASTATIN 20 MG
20 TABLET ORAL NIGHTLY
COMMUNITY

## 2022-12-21 RX ORDER — ACETAMINOPHEN 500 MG
500 TABLET ORAL 2 TIMES DAILY PRN
COMMUNITY

## 2022-12-21 RX ORDER — GABAPENTIN 800 MG/1
800 TABLET ORAL DAILY
COMMUNITY
End: 2022-12-28 | Stop reason: HOSPADM

## 2022-12-21 RX ORDER — PROPOFOL 10 MG/ML
VIAL (ML) INTRAVENOUS AS NEEDED
Status: DISCONTINUED | OUTPATIENT
Start: 2022-12-21 | End: 2022-12-21 | Stop reason: SURG

## 2022-12-21 RX ORDER — PROMETHAZINE HYDROCHLORIDE 25 MG/1
25 TABLET ORAL ONCE AS NEEDED
Status: DISCONTINUED | OUTPATIENT
Start: 2022-12-21 | End: 2022-12-21 | Stop reason: HOSPADM

## 2022-12-21 RX ORDER — HEPARIN SODIUM 1000 [USP'U]/ML
INJECTION, SOLUTION INTRAVENOUS; SUBCUTANEOUS AS NEEDED
Status: DISCONTINUED | OUTPATIENT
Start: 2022-12-21 | End: 2022-12-21 | Stop reason: SURG

## 2022-12-21 RX ORDER — SODIUM CHLORIDE 9 MG/ML
100 INJECTION, SOLUTION INTRAVENOUS CONTINUOUS
Status: DISCONTINUED | OUTPATIENT
Start: 2022-12-21 | End: 2022-12-24

## 2022-12-21 RX ORDER — OXYCODONE AND ACETAMINOPHEN 7.5; 325 MG/1; MG/1
1 TABLET ORAL EVERY 4 HOURS PRN
Status: DISCONTINUED | OUTPATIENT
Start: 2022-12-21 | End: 2022-12-21 | Stop reason: HOSPADM

## 2022-12-21 RX ORDER — HEPARIN SODIUM 5000 [USP'U]/ML
5000 INJECTION, SOLUTION INTRAVENOUS; SUBCUTANEOUS EVERY 8 HOURS SCHEDULED
Status: DISCONTINUED | OUTPATIENT
Start: 2022-12-22 | End: 2022-12-24

## 2022-12-21 RX ORDER — ALLOPURINOL 300 MG/1
300 TABLET ORAL DAILY
COMMUNITY

## 2022-12-21 RX ORDER — DIPHENOXYLATE HYDROCHLORIDE AND ATROPINE SULFATE 2.5; .025 MG/1; MG/1
1 TABLET ORAL 4 TIMES DAILY PRN
COMMUNITY

## 2022-12-21 RX ORDER — SODIUM CHLORIDE, SODIUM LACTATE, POTASSIUM CHLORIDE, CALCIUM CHLORIDE 600; 310; 30; 20 MG/100ML; MG/100ML; MG/100ML; MG/100ML
INJECTION, SOLUTION INTRAVENOUS CONTINUOUS PRN
Status: DISCONTINUED | OUTPATIENT
Start: 2022-12-21 | End: 2022-12-21 | Stop reason: SURG

## 2022-12-21 RX ORDER — LABETALOL HYDROCHLORIDE 5 MG/ML
5 INJECTION, SOLUTION INTRAVENOUS
Status: DISCONTINUED | OUTPATIENT
Start: 2022-12-21 | End: 2022-12-21 | Stop reason: HOSPADM

## 2022-12-21 RX ORDER — SODIUM BICARBONATE IN D5W 150/1000ML
150 PLASTIC BAG, INJECTION (ML) INTRAVENOUS CONTINUOUS
Status: DISCONTINUED | OUTPATIENT
Start: 2022-12-21 | End: 2022-12-21

## 2022-12-21 RX ORDER — EPHEDRINE SULFATE 50 MG/ML
5 INJECTION, SOLUTION INTRAVENOUS ONCE AS NEEDED
Status: DISCONTINUED | OUTPATIENT
Start: 2022-12-21 | End: 2022-12-21 | Stop reason: HOSPADM

## 2022-12-21 RX ADMIN — FENTANYL CITRATE 50 MCG: 50 INJECTION, SOLUTION INTRAMUSCULAR; INTRAVENOUS at 11:25

## 2022-12-21 RX ADMIN — Medication 10 ML: at 09:10

## 2022-12-21 RX ADMIN — TAZOBACTAM SODIUM AND PIPERACILLIN SODIUM 3.38 G: 375; 3 INJECTION, SOLUTION INTRAVENOUS at 23:29

## 2022-12-21 RX ADMIN — PERFLUTREN 2 ML: 6.52 INJECTION, SUSPENSION INTRAVENOUS at 10:18

## 2022-12-21 RX ADMIN — TAZOBACTAM SODIUM AND PIPERACILLIN SODIUM 3.38 G: 375; 3 INJECTION, SOLUTION INTRAVENOUS at 09:03

## 2022-12-21 RX ADMIN — Medication 150 MCG: at 10:50

## 2022-12-21 RX ADMIN — TAZOBACTAM SODIUM AND PIPERACILLIN SODIUM 3.38 G: 375; 3 INJECTION, SOLUTION INTRAVENOUS at 15:22

## 2022-12-21 RX ADMIN — HEPARIN SODIUM 5 UNITS/KG/HR: 10000 INJECTION, SOLUTION INTRAVENOUS at 01:20

## 2022-12-21 RX ADMIN — PHENYLEPHRINE HYDROCHLORIDE 0.25 MCG/KG/MIN: 10 INJECTION INTRAVENOUS at 11:00

## 2022-12-21 RX ADMIN — PROPOFOL INJECTABLE EMULSION 35 MCG/KG/MIN: 10 INJECTION, EMULSION INTRAVENOUS at 05:01

## 2022-12-21 RX ADMIN — PROPOFOL INJECTABLE EMULSION 20 MCG/KG/MIN: 10 INJECTION, EMULSION INTRAVENOUS at 22:12

## 2022-12-21 RX ADMIN — PROPOFOL 50 MG: 10 INJECTION, EMULSION INTRAVENOUS at 10:21

## 2022-12-21 RX ADMIN — ROCURONIUM BROMIDE 40 MG: 10 INJECTION, SOLUTION INTRAVENOUS at 10:24

## 2022-12-21 RX ADMIN — PROPOFOL INJECTABLE EMULSION 40 MCG/KG/MIN: 10 INJECTION, EMULSION INTRAVENOUS at 01:33

## 2022-12-21 RX ADMIN — Medication 100 MCG: at 10:40

## 2022-12-21 RX ADMIN — Medication 100 MCG: at 10:27

## 2022-12-21 RX ADMIN — PROPOFOL INJECTABLE EMULSION 25 MCG/KG/MIN: 10 INJECTION, EMULSION INTRAVENOUS at 09:02

## 2022-12-21 RX ADMIN — Medication 100 MCG: at 12:04

## 2022-12-21 RX ADMIN — HEPARIN SODIUM 11000 UNITS: 1000 INJECTION INTRAVENOUS; SUBCUTANEOUS at 11:43

## 2022-12-21 RX ADMIN — IOPAMIDOL 25 ML: 510 INJECTION, SOLUTION INTRAVASCULAR at 16:04

## 2022-12-21 RX ADMIN — FENTANYL CITRATE 50 MCG: 50 INJECTION, SOLUTION INTRAMUSCULAR; INTRAVENOUS at 12:01

## 2022-12-21 RX ADMIN — SODIUM CHLORIDE 100 ML/HR: 9 INJECTION, SOLUTION INTRAVENOUS at 22:11

## 2022-12-21 RX ADMIN — PROTAMINE SULFATE 50 MG: 10 INJECTION, SOLUTION INTRAVENOUS at 12:04

## 2022-12-21 RX ADMIN — EPINEPHRINE 0.04 MCG/KG/MIN: 1 INJECTION INTRAMUSCULAR; INTRAVENOUS; SUBCUTANEOUS at 05:00

## 2022-12-21 RX ADMIN — Medication 150 MCG: at 12:08

## 2022-12-21 RX ADMIN — Medication 150 MEQ: at 09:49

## 2022-12-21 RX ADMIN — TAZOBACTAM SODIUM AND PIPERACILLIN SODIUM 3.38 G: 375; 3 INJECTION, SOLUTION INTRAVENOUS at 00:45

## 2022-12-21 RX ADMIN — SODIUM CHLORIDE, POTASSIUM CHLORIDE, SODIUM LACTATE AND CALCIUM CHLORIDE: 600; 310; 30; 20 INJECTION, SOLUTION INTRAVENOUS at 10:11

## 2022-12-21 RX ADMIN — Medication 150 MCG: at 10:30

## 2022-12-21 RX ADMIN — Medication 150 MCG: at 11:31

## 2022-12-21 RX ADMIN — Medication 10 ML: at 20:09

## 2022-12-21 RX ADMIN — INSULIN HUMAN 10 UNITS: 100 INJECTION, SOLUTION PARENTERAL at 00:43

## 2022-12-21 RX ADMIN — PROPOFOL INJECTABLE EMULSION 20 MCG/KG/MIN: 10 INJECTION, EMULSION INTRAVENOUS at 15:11

## 2022-12-21 RX ADMIN — PROPOFOL 30 MG: 10 INJECTION, EMULSION INTRAVENOUS at 12:01

## 2022-12-21 RX ADMIN — INSULIN HUMAN 12 UNITS: 100 INJECTION, SOLUTION PARENTERAL at 06:15

## 2022-12-21 NOTE — PROGRESS NOTES
Discussed with critical care.  Will start heparin infusion at 5 units/cc/h for a 5 to 600 units of heparin per hour constant infusion.  Do not bolus.  Do not titrate.

## 2022-12-21 NOTE — PROGRESS NOTES
[facility] Follow Up    Chief Complaint: Follow up cardiac arrest, bradycardia    Interval History: Tachycardic this morning with heart rates in the 100s.  Remains on epinephrine.  No recurrent episodes of bradycardia.    Objective:     Objective:  Temp:  [92.8 °F (33.8 °C)-100.4 °F (38 °C)] 100.4 °F (38 °C)  Heart Rate:  [] 107  Resp:  [18-24] 24  BP: ()/(52-86) 124/66  Arterial Line BP: (-)/(-28-72) 119/72  FiO2 (%):  [49 %-100 %] 49 %     Intake/Output Summary (Last 24 hours) at 12/21/2022 0744  Last data filed at 12/21/2022 0600  Gross per 24 hour   Intake 1961.7 ml   Output 750 ml   Net 1211.7 ml     Body mass index is 35.9 kg/m².      12/20/22  1526 12/20/22  1800 12/21/22  0520   Weight: 118 kg (259 lb 14.4 oz) 113 kg (249 lb 1.9 oz) 113 kg (250 lb 3.6 oz)     Weight change:       Physical Exam:   General : Intubated  Neuro: Sedated  Lungs: CTAB. Normal respiratory effort and rate.  CV: Tachycardic, regular rhythm, normal S1 and S2, no murmurs, gallops or rubs.  ABD: Soft, nontender, nondistended. Positive bowel sounds.  Extr: No edema or cyanosis, moves all extremities.    Lab Review:   Results from last 7 days   Lab Units 12/21/22  0522 12/20/22  1534   SODIUM mmol/L 140 141   POTASSIUM mmol/L 3.5 4.5   CHLORIDE mmol/L 106 108*   CO2 mmol/L 17.0* 25.0   BUN mg/dL 24* 27*   CREATININE mg/dL 1.86* 1.64*   GLUCOSE mg/dL 375* 168*   CALCIUM mg/dL 8.6 8.6   AST (SGOT) U/L  --  34   ALT (SGPT) U/L  --  21     Results from last 7 days   Lab Units 12/20/22  2124 12/20/22  1534   TROPONIN T ng/mL <0.010 <0.010     Results from last 7 days   Lab Units 12/21/22  0450 12/20/22  1534   WBC 10*3/mm3 12.96* 5.88   HEMOGLOBIN g/dL 12.4* 12.3*   HEMATOCRIT % 36.4* 37.3*   PLATELETS 10*3/mm3 190 171     Results from last 7 days   Lab Units 12/21/22  0413 12/20/22  2237   INR   --  1.09   APTT seconds 35.3 31.5     Results from last 7 days   Lab Units 12/20/22  1534   MAGNESIUM mg/dL  1.6           Invalid input(s): LDLCALC  Results from last 7 days   Lab Units 12/20/22  1534   PROBNP pg/mL 77.4         I reviewed the patient's new clinical results.  I personally viewed and interpreted the patient's EKG  Current Medications:   Scheduled Meds:insulin regular, 0-14 Units, Subcutaneous, Q6H  piperacillin-tazobactam, 3.375 g, Intravenous, Once  piperacillin-tazobactam, 3.375 g, Intravenous, Q8H  sodium chloride, 10 mL, Intravenous, Q12H      Continuous Infusions:EPINEPHrine, 0.02-0.3 mcg/kg/min, Last Rate: 0.04 mcg/kg/min (12/21/22 0500)  heparin, 5 Units/kg/hr, Last Rate: 5 Units/kg/hr (12/21/22 0120)  Pharmacy to dose vancomycin,   propofol, 5-50 mcg/kg/min, Last Rate: 35 mcg/kg/min (12/21/22 0501)  sodium chloride, 125 mL/hr        Allergies:  Allergies   Allergen Reactions   • Nsaids Other (See Comments)     High cr  High cr         Assessment/Plan:     1. Cardiac arrest.  Recurrent PEA arrest.   Troponin and BNP are normal.  EKG with chronic bifascicular block but otherwise no ischemic changes.  No reports of ventricular arrhythmias.  Repeat EKG this morning is unchanged except heart rate has increased.  2. Bradycardia.  Reportedly preceded by bradycardia and possible pauses but no strips to review available.  No definite heart block noted or reported.  He is tachycardic today on an epinephrine drip.  No recurrent episodes of bradycardia overnight on epinephrine.  3. Peripheral vascular disease.  Status post left lower extremity intervention.  Now status post right lower extremity intervention with a right femoral antegrade access.  Vascular surgery is following.  4.  History of recurrent DVT.  CT angiogram of the chest here shows no evidence of pulmonary embolism.  5.  History of hypertension  6.  Hyperlipidemia  7.  Diabetes mellitus type 2  8.  Chronic kidney disease  9.  Obesity    - Wean epinephrine off as tolerates.  I would like to see what his underlying rhythm is off infusion.     -Follow-up on echocardiogram.    Flor Grande MD  12/21/22  07:44 EST

## 2022-12-21 NOTE — ANESTHESIA PREPROCEDURE EVALUATION
Anesthesia Evaluation     NPO Solid Status: > 8 hours             Airway   Mallampati: unable to access  Dental      Pulmonary    Cardiovascular     (+) hypertension, PVD, DVT current,     ROS comment: Recurrent DVTs, patient on chronic anticoagulation    Neuro/Psych  GI/Hepatic/Renal/Endo    (+)  GERD,  renal disease CRI, diabetes mellitus,     Musculoskeletal     Abdominal    Substance History      OB/GYN          Other        ROS/Med Hx Other: Patient coded at [facility] 12/20. Intubated in the field per EMS. Ventilated in ICU since arrival to [facility]. Patient had bradycardic/PEA episode in CT scanner here after arrival.                  Anesthesia Plan    ASA 4     general             CODE STATUS:    Code Status (Patient has no pulse and is not breathing): CPR (Attempt to Resuscitate)  Medical Interventions (Patient has pulse or is breathing): Full Support

## 2022-12-21 NOTE — NURSING NOTE
Multiple attempts by several nurses to obtain lab specimen from multiple venipunctures with BC x 2 sets drawn and STAT ordered labs including pre-Heparin PT and PTT per protocol. RFA sheath will not provide blood return sufficient for lab specimens to be drawn. Site flushes easily and pressure waveform sufficient and synchronizing well with auto cuff pressure.

## 2022-12-21 NOTE — CONSULTS
Nutrition Services    Patient Name:  Evangelist Garcia  YOB: 1945  MRN: [id number]  Admit Date:  12/20/2022  Assessment Date:  12/21/22    Comment: Nutrition Consult for cortrak and TF assessment.   This is a 76 yo male who is s/p heart arrest while undergoing vascular procedure  Pt is on the vent. Propofol 28.3  NG Cortrak place.     Recommend Peptamen Intense VHP at 20ml/hr and water  Flush [id number].   Can advance as tolerated to goal rate of 50ml/hr Peptamen Intense with water flush [id number].    Will continue to follow for clinical course and monitor nutritional needs.         CLINICAL NUTRITION ASSESSMENT      Reason for Assessment Cortrak Placement, Tube Feeding Assessment      Diagnosis/Problem   s/p heart arrest while undergoing vascular procedure. Hx of HTN, DM, PVD     Medical/Surgical History No past medical history on file.    No past surgical history on file.     Encounter Information        Nutrition History:  Po usually good per wife   Food Preferences:    Supplements:    Factors Affecting Intake: altered respiratory status     Anthropometrics        Current Height  Current Weight  BMI kg/m2 Height: 177.8 cm (70\")  Weight: 113 kg (250 lb) (12/21/22 1024)  Body mass index is 35.87 kg/m².   Adjusted BMI (if applicable)        Admission Weight        Ideal Body Weight (IBW) 73 kg   Adjusted IBW (if applicable)        Usual Body Weight (UBW) 250   Weight Change/Trend Stable       Weight History Wt Readings from Last 30 Encounters:   12/21/22 1024 113 kg (250 lb)   12/21/22 0520 113 kg (250 lb 3.6 oz)   12/20/22 1800 113 kg (249 lb 1.9 oz)   12/20/22 1526 118 kg (259 lb 14.4 oz)   05/15/14 0846 113 kg (249 lb 9 oz)   11/14/13 1045 113 kg (249 lb 5.1 oz)           --  Tests/Procedures        Tests/Procedures X-Ray     Labs       Pertinent Labs    Results from last 7 days   Lab Units 12/21/22  0522 12/20/22  1534   SODIUM mmol/L 140 141   POTASSIUM mmol/L 3.5 4.5   CHLORIDE mmol/L 106 108*   CO2  mmol/L 17.0* 25.0   BUN mg/dL 24* 27*   CREATININE mg/dL 1.86* 1.64*   CALCIUM mg/dL 8.6 8.6   BILIRUBIN mg/dL  --  0.5   ALK PHOS U/L  --  58   ALT (SGPT) U/L  --  21   AST (SGOT) U/L  --  34   GLUCOSE mg/dL 375* 168*     Results from last 7 days   Lab Units 12/21/22  0450 12/20/22  1534   MAGNESIUM mg/dL  --  1.6   HEMOGLOBIN g/dL 12.4* 12.3*   HEMATOCRIT % 36.4* 37.3*   WBC 10*3/mm3 12.96* 5.88   ALBUMIN g/dL  --  3.30*     Results from last 7 days   Lab Units 12/21/22  0450 12/21/22  0413 12/20/22  2237 12/20/22  1534   INR   --   --  1.09  --    APTT seconds  --  35.3 31.5  --    PLATELETS 10*3/mm3 190  --   --  171     No results found for: COVID19  Lab Results   Component Value Date    HGBA1C 9.2 (H) 09/28/2022          Medications           Scheduled Medications [START ON 12/22/2022] heparin (porcine), 5,000 Units, Subcutaneous, Q8H  insulin regular, 0-14 Units, Subcutaneous, Q6H  piperacillin-tazobactam, 3.375 g, Intravenous, Once  piperacillin-tazobactam, 3.375 g, Intravenous, Q8H  sodium chloride, 10 mL, Intravenous, Q12H       Infusions EPINEPHrine, 0.02-0.3 mcg/kg/min, Last Rate: Stopped (12/21/22 0904)  phenylephrine, 0.5-3 mcg/kg/min, Last Rate: 0.5 mcg/kg/min (12/21/22 1240)  propofol, 5-50 mcg/kg/min, Last Rate: 40 mcg/kg/min (12/21/22 1240)  sodium chloride, 100 mL/hr, Last Rate: 100 mL/hr (12/21/22 1450)       PRN Medications •  dextrose  •  dextrose  •  glucagon (human recombinant)  •  [COMPLETED] Insert Peripheral IV **AND** sodium chloride  •  sodium chloride  •  sodium chloride     Physical Findings          Physical Appearance obese, ventilator support   Oral/Mouth Cavity teeth missing   Edema  1+ (trace), 2+ (mild)   Gastrointestinal hypoactive bowel sounds, normoactive   Skin  skin intact   Tubes/Drains Cortrak, NG tube, pt sedated no NB   NFPE Not applicable at this time   -  Estimated/Assessed Needs       Energy Requirements    Height for Calculation  Height: 177.8 cm (70\")   Weight for  Calculation 113 kg   Method for Estimation  14 kcal/kg, 20 kcal/kg   EST Needs (kcal/day) 1143-7957       Protein Requirements    Weight for Calculation 73 kg   EST Protein Needs (g/kg) 1.5 gm/kg   EST Daily Needs (g/day) 109       Fluid Requirements     Method for Estimation 1 mL/kcal    Estimated Needs (mL/day) 2260       Fluid Deficit    Current Na Level (mEq/L)    Desired Na Level (mEq/L)    Estimated Fluid Deficit (L)      113 kg   -  Current Nutrition Orders & Evaluation of Intake       Oral Nutrition     Food Allergies NKFA   Current PO Diet NPO Diet NPO Type: Strict NPO   Supplement n/a   PO Evaluation     % PO Intake     # of Days Evaluated    --  PES STATEMENT / NUTRITION DIAGNOSIS      Nutrition Dx Problem  Problem: Needs Alternative Route  Etiology: Medical Diagnosis respiratory failure  Signs/Symptoms: NPO  Comment:    --  NUTRITION INTERVENTION / PLAN OF CARE      Intervention Goal(s) Maintain nutrition status, Nutrition support treatment, Improved nutrition related labs, Reduce/improve symptoms, Initiate TF/PN and Tolerate TF/PN at goal         RD Intervention/Action Await initiation of EN/PN, Follow Tx Progress and Care plan reviewed         Prescription/Orders:       PO Diet       Supplements       Snacks       Enteral Nutrition       Parenteral Nutrition    New Prescription Ordered?    -   Enteral Prescription:     Enteral Route NG    TF Delivery Method Continuous    Enteral Product Peptamen Intense VHP    Modular     Propofol Rate/Kcal 28.3---747    TF Start Rate (mL/hr) 20    TF Goal Rate (mL/hr) 50    Free Water Flush (mL) [id number]    TF Provision at Goal: 1200kcals plus propofol = 1947kcal,   110gm protein,   1008mL free water + 180mL water flushes         Calories 100% needs met         Protein   110% needs met         Fluid (mL)  1188mL total     Prescription Ordered Yes   -      Monitor/Evaluation Per protocol, I&O, Pertinent labs, EN delivery/tolerance, Weight, Skin status, GI status,  Symptoms, POC/GOC   Discharge Plan/Needs Pending clinical course   Education Will instruct as appropriate   --    RD to follow per protocol.      Electronically signed by:  Maura Wise RD  12/21/22 15:07 EST

## 2022-12-21 NOTE — NURSING NOTE
Triple lumen catheter central line insertion completed per Dr. Angel to left internal jugular vein after several attempts on the right side without success. Central line inserted on the left IJ has good blood return and flushes easily.

## 2022-12-21 NOTE — NEONATAL DELIVERY NOTE
Patient's wife - Jamila contacted for informed consent for insertion of triple lumen central line by Dr. Angel, consent provided and witnessed by nurses yohannes - JESSICA Burch & JESSICA Skelton and documented appropriately.

## 2022-12-21 NOTE — NURSING NOTE
Critical lab results rec'd per call from lab of lactic acid = 8.7. Dr. Angel (on-call provider) notified of critical result (no new orders - on IVF @ 125cc/hr, antibiotics, and blood cultures drawn x2).  Dr. Angel did ask nurse to acquire informed consent for central line insertion due to critical illness/sepsis.

## 2022-12-21 NOTE — PROGRESS NOTES
[facility]  [phone]  Dr. Howie Christina    Subjective:  LOS: 1    Chief Complaint: Resuscitated cardiac arrest    Patient was admitted after cardiac arrest while undergoing vascular procedure at [facility] surgical facility.  Currently unresponsive on propofol.  Daughter at bedside.    Objective   Vital Signs past 24hrs    Temp range: Temp (24hrs), Av.7 °F (35.9 °C), Min:92.8 °F (33.8 °C), Max:100.4 °F (38 °C)    BP range: BP: ()/(52-86) 124/66  Pulse range: Heart Rate:  [] 107  Resp rate range: Resp:  [18-24] 24    Device (Oxygen Therapy): ventilatorFlow (L/min):  [15] 15  Oxygen range:SpO2:  [0 %-100 %] 92 %   FiO2 (%):  [49 %-100 %] 49 %  S RR:  [] 24  PEEP/CPAP (cm H2O):  [5 cm H20] 5 cm H20  MAP (cm H2O):  [10-15] 15  113 kg (250 lb 3.6 oz); Body mass index is 35.9 kg/m².    Intake/Output Summary (Last 24 hours) at 2022 0756  Last data filed at 2022 0600  Gross per 24 hour   Intake 1961.7 ml   Output 750 ml   Net 1211.7 ml       Physical Exam  Constitutional:       Interventions: He is sedated, intubated and restrained.   Eyes:      Pupils: Pupils are equal, round, and reactive to light.   Cardiovascular:      Rate and Rhythm: Normal rate and regular rhythm.      Heart sounds: No murmur heard.  Pulmonary:      Effort: Pulmonary effort is normal. He is intubated.      Breath sounds: Normal breath sounds.   Abdominal:      General: Bowel sounds are absent.      Palpations: Abdomen is soft. There is no mass.      Tenderness: There is no abdominal tenderness.   Musculoskeletal:         General: No swelling.       Results Review:    I have reviewed the laboratory and imaging data since the last note by [facility] physician.  My annotations are noted in assessment and plan.    Medication Review:  I have reviewed the current MAR.  My annotations are noted in assessment and plan.    EPINEPHrine, 0.02-0.3 mcg/kg/min, Last Rate: 0.04 mcg/kg/min (22 0500)  heparin, 5  Units/kg/hr, Last Rate: 5 Units/kg/hr (12/21/22 0120)  Pharmacy to dose vancomycin,   propofol, 5-50 mcg/kg/min, Last Rate: 35 mcg/kg/min (12/21/22 0501)  sodium chloride, 125 mL/hr      Plan   Lines, Drains & Airways     Active LDAs     Name Placement date Placement time Site Days    Peripheral IV 12/20/22 1532 Anterior;Left Forearm 12/20/22  1532  Forearm  less than 1    Peripheral IV 12/20/22 1656 Anterior;Proximal;Right;Upper Arm 12/20/22  1656  Arm  less than 1    Peripheral IV 12/20/22 1652 Anterior;Left;Upper Arm 12/20/22  1652  Arm  less than 1    Urethral Catheter Non-latex 16 Fr. 12/20/22  1901  -- less than 1    ETT  12/20/22  1521  -- less than 1    Arterial Line 12/20/22 Right Femoral 12/20/22  --  Femoral  1              PCCM Problems  Resuscitated cardiac arrest with PEA and bradycardia  Acute respiratory failure, mechanical ventilation  Severe acute metabolic lactic acidosis  Right femoral artery revascularization procedure, 12/20/2022  Bibasilar infiltrates  Relevant Medical Diagnoses  Left femoral artery revascularization procedure 12-22  Recurrent DVT on apixaban  Diabetes type 2  Hypertension  CKD 3B  Obesity  DONOVAN on CPAP      THESE ARE NEW MEDICAL PROBLEMS TO ME.    Plan of Treatment    Resuscitated cardiac arrest and pending echocardiogram.  Discussed with cardiology.  Currently heart rate is adequate and no indication for pacemaker.    Ventilator settings adjusted to PC mode.  We will repeat ABG at 12 noon.    Severe acute metabolic lactic acidosis noted.  CK is normal.  We will start bicarb drip.  Unclear etiology for lactic acid so far out from purported cardiac event.  Need to exclude bowel ischemia and will get CT abdomen pelvis.    Patient still has sheath in the right groin.  This will be removed by vascular surgery later today in the OR.    Bibasilar infiltrates probably just atelectasis.  However patient is on Zosyn and vancomycin to cover for possible infection.    Need to assess  mental status and I reduced his propofol at bedside.    CKD 3B and currently creatinine looks like its baseline.    Patient is on apixaban for history of recurrent DVTs.  Currently he is on heparin drip low-dose due to vascular procedure and revascularization done yesterday.    Guarded prognosis.    I spent 60 mins critical care time in care of this patient outside of any procedures.     Howie Christina MD  12/21/22  07:56 EST      Part of this note may be an electronic transcription/translation of spoken language to printed text using the Dragon Dictation System.

## 2022-12-21 NOTE — POST-PROCEDURE NOTE
Procedure: Central venous catheter left IJ.  Consent: Nursing was able to get hold of family and get informed consent.  Indication: Nursing unable to get adequate peripheral access additionally patient on high-dose epinephrine in need of central line for high-dose vasopressor administration.  Procedure: Patient is a very thick gentleman I ultrasound of his subclavian and it was a right at 4.9-5 cm from the skin surface even with pressure makes it difficult with a introducer needle its only 5 cm.  Therefore went to the right IJ had a compressible vessel patient was ChloraPrep x2 maximal barrier precautions 5 cc 1% lidocaine subcutaneously at the procedure site between the heads of sternocleidomastoid on the right with ultrasound guidance the right IJ was cannulated without difficulty.  The guidewire would only pass down about 30 cm.  It look to be well positioned in the center of the vessel on ultrasound I went ahead and placed the central venous catheter using modified Seldinger technique I can only get the catheter in about 11 cm.  The distal port would aspirate blood but the more proximal ports would not it would not go further in.  I got a new kit stuck him more cephalad and experienced similar guidewire about the same distance catheter not passing beyond that therefore I aborted the right IJ approach.  New kit reprepped the patient with chlorhexidine x2 maximal barrier precautions 5 cc 1% lidocaine subcutaneously within the heads of sternocleidomastoid on the left.  With ultrasound guidance 1 stick the left IJ was cannulated and using modified Seldinger technique a 16 cm catheter was inserted without difficulty secured with two 2-0 silk sutures all ports aspirated and flushed easily Biopatch and dressing applied no complication chest x-ray has been ordered to confirm placement.

## 2022-12-21 NOTE — NURSING NOTE
Radiology to room for PCXR to confirm placement of Left IJ catheter tip per protocol.   at bedside with approval given and rec'd by RN to use the central line now.

## 2022-12-21 NOTE — PLAN OF CARE
Goal Outcome Evaluation:      Pt remains in CCU on vent. Went today for Right angiogram and Repair of the right femoral artery with Dr. Soria. New jelly placed in left radial. Right femoral site has island dressing in place with some minimal drainage that was present on arrival from PACU. Site is soft. Pulses are palpable. Tom is off. Cortrak placed and TF started, see orders. VSS this shift. Will CTM closely.

## 2022-12-21 NOTE — PROGRESS NOTES
[facility] Clinical Pharmacy Services: Vancomycin Pharmacokinetic Initial Consult Note    Evangelist Garcia is a 77 y.o. male who is on day 1 of pharmacy to dose vancomycin.    Indication: Pneumonia  Consulting Provider: Dr. Salmon  Planned Duration of Therapy: 7 days  Loading Dose Ordered or Given: 2250 mg on 12/20 at 2230  MRSA PCR performed: NOt detected  Culture/Source:Bcx 1 set in process  Target: Dose by Levels  Other Antimicrobials: Zosyn    Vitals/Labs  Ht: 177.8 cm (70\"); Wt: 113 kg (249 lb 1.9 oz)  Temp Readings from Last 1 Encounters:   12/20/22 92.8 °F (33.8 °C) (Rectal)    Estimated Creatinine Clearance: 46 mL/min (A) (by C-G formula based on SCr of 1.64 mg/dL (H)).        Results from last 7 days   Lab Units 12/20/22  1534   CREATININE mg/dL 1.64*   WBC 10*3/mm3 5.88     Assessment/Plan:  Patient has a history of CKD, his baseline looks like baseline. But I see MD notes referring  pt is acute on his CKD. Will dose per levels for now   Vancomycin Dose:  Dosing per levels  Vanc Random has been ordered for 12/21 at 1100     Pharmacy will follow patient's kidney function and will adjust doses and obtain levels as necessary. Thank you for involving pharmacy in this patient's care. Please contact pharmacy with any questions or concerns.                           Steve Zayas, [facility]  Clinical Pharmacist

## 2022-12-21 NOTE — ANESTHESIA PROCEDURE NOTES
Arterial Line      Patient reassessed immediately prior to procedure    Patient location during procedure: pre-op   Line placed for ABGs/Labs/ISTAT and hemodynamic monitoring.  Performed By   Anesthesiologist: Andi Staley MD   Preanesthetic Checklist  Completed: patient identified  Arterial Line Prep    Sterile Tech: cap, gown and mask  Prep: ChloraPrep  Arterial Line Procedure   Laterality:left  Location:  radial artery  Catheter size: 20 G   Guidance: ultrasound guided  PROCEDURE NOTE/ULTRASOUND INTERPRETATION.  Using ultrasound guidance the potential vascular sites for insertion of the catheter were visualized to determine the patency of the vessel to be used for vascular access.  After selecting the appropriate site for insertion, the needle was visualized under ultrasound being inserted into the radial artery, followed by ultrasound confirmation of wire and catheter placement. There were no abnormalities seen on ultrasound; an image was taken; and the patient tolerated the procedure with no complications.   Number of attempts: 2  Successful placement: yes   Post Assessment   Dressing Type: biopatch applied.   Complications no   Patient Tolerance: patient tolerated the procedure well with no apparent complications  Additional Notes  Ultrasound interpretation note:  Under ultrasound guidance, potential vessels were viewed, radial artery seen patent.  Needle and wire seen entering vessel, catheter seen in vessel.  Image recorded and placed in chart.  No abnormalities noted.

## 2022-12-21 NOTE — OP NOTE
Date of Admission:  12/20/2022 12/21/22  Noemi Blount MD  [facility]    Preoperative Diagnosis:   Peripheral arterial disease with diabetes.    Postoperative Diagnosis:   Same    Procedure Performed:   Right lower extremity angiogram with runoff  Open repair of the right superficial femoral artery (CPT 69214)    CPT:  .  51888-65 Unilateral extremity runoff arteriogram (no diagnostic quality arterial imaging prior or significant change in vascular status)  .  .    Surgeon:   Noemi Blount MD    Assistant:    Miguel Khan MD; Lynnette Sheffield RN.  ..., Provided critical assistance in exposure, retraction, and suction that overall decrease blood loss and operative time.    Anesthesia:   General    Estimated Blood Loss:    cc    Findings:  Indwelling sheath in the right SFA, runoff arteriogram obtained with no significant stenosis of the SFA, profunda femoral artery, popliteal artery, anterior tibial, posterior tibial, or peroneal arteries.  There was two vessel runoff to the foot via the anterior and posterior tibial arteries    Implants:    Implant Name Type Inv. Item Serial No.  Lot No. LRB No. Used Action   CLIPAPPLR M/ ENDO LIGACLIP 9 3/8IN SM - [id number] Implant CLIPAPPLR M/ ENDO LIGACLIP 9 3/8IN   [employer]  DIV OF J AND J [id number] Right 2 Implanted   CLIPAPPLR M/ ENDO LIGACLIP9 3/8IN MD - [id number] Implant CLIPAPPLR M/ ENDO LIGACLIP9 3/8IN MD  [employer]  DIV OF J AND J [id number] Right 1 Implanted   HEMOST ABS SURGICEL FIBRILLAR 1X2 - [id number] Implant HEMOST ABS SURGICEL FIBRILLAR 1X2  [employer]  DIV OF J AND J [id number] Right 1 Implanted       Specimen:   none    Complications:   none    Dispo:  To PACU then return to ICU    Indication for procedure:   77 y.o. male who presented to the [facility] ER after undergoing a right lower extremity angiogram at [facility] and subsequent code event.  He had an antegrade right superficial femoral artery 6 Fr  sheath still in place.  This was maintained overnight while the patient was stabilized.  He returns to OR for sheath removal and angiogram.     Description of procedure:   Patient brought to the operating room and positioned supine on the operating table.  Timeout performed with all OR staff present and in agreement.  Appropriate perioperative antibiotics were given prior to procedure start.  Bilateral groins and right leg prepped and draped in standard sterile fashion.  The indwelling 6 Fr sheath in the right SFA flushed easily and an angiogram was obtained through this access that showed right SFA was patent without significant stenosis, right profunda femoral artery was patent without significant stenosis, popliteal artery patent without stenosis, anterior tibial artery patent without stenosis, peroneal artery patent without stenosis, and posterior tibial artery patent without stenosis.  There was two vessel runoff to the foot via the anterior and posterior tibial arteries and an intact plantar arch.  I placed a stiff glidewire through this sheath and into the popliteal artery under fluoroscopy then exchanged this sheath for a new 6 Fr sheath.  I decided to close this arterial puncture with a ProGlide closure device, but this did not fire properly.  I then made a transverse incision around the sheath and carried the dissection down to the femoral sheath, which was opened longitudinally.  The common femoral, profunda femoral, and superficial femoral arteries were exposed.  There were proglide sutures in the wound that had been placed at the outside facility which I removed.  The patient was systemically heparinized to an ACT of >250.  The arteries were clamped and the sheath was removed.  The arteriotomy was closed primarily with a running 6-0 prolene suture.  Clamps were removed and there were multiphasic doppler signals at the right anterior tibial and posterior tibial at the ankle.  Protamine was given for  heparin reversal.  The incision was irrigated with a large amount of NS and hemostasis was achieved.  The incision was closed in layers with running 2-0 and 3-0 vicryl sutures with staples for the skin.      All counts were reported as correct at the conclusion of the procedure.  Patient tolerated the procedure well without any apparent complications.  Anesthesia was reversed and patient was transferred to the PACU in stable condition.      Noemi Blount MD  12/21/22    Active Hospital Problems    Diagnosis  POA   • **Cardiac arrest (HCC) [I46.9]  Yes      Resolved Hospital Problems   No resolved problems to display.

## 2022-12-21 NOTE — PROGRESS NOTES
Clinical Pharmacy Services: Medication History    Evangelist Garcia is a 77 y.o. male presenting to [facility] for Cardiac arrest (HCC) [I46.9]    He  has no past medical history on file.    Allergies as of 12/20/2022 - Reviewed 12/20/2022   Allergen Reaction Noted   • Nsaids Other (See Comments) 07/12/2017       Medication information was obtained from: home medication list and pharmacy  Pharmacy and Phone Number: [facility]     Prior to Admission Medications     Prescriptions Last Dose Informant Patient Reported? Taking?    acetaminophen (TYLENOL) 500 MG tablet   Yes Yes    Take 500 mg by mouth 2 (Two) Times a Day As Needed for Mild Pain.    allopurinol (ZYLOPRIM) 300 MG tablet   Yes Yes    Take 300 mg by mouth Daily.    apixaban (ELIQUIS) 5 MG tablet tablet   Yes Yes    Take 5 mg by mouth Every 12 (Twelve) Hours.    diphenoxylate-atropine (LOMOTIL) 2.5-0.025 MG per tablet   Yes Yes    Take 1 tablet by mouth 4 (Four) Times a Day As Needed for Diarrhea. Reports 3x week  need on home med list    famotidine (PEPCID) 20 MG tablet   Yes Yes    Take 20 mg by mouth Daily.    furosemide (LASIX) 20 MG tablet   Yes Yes    Take 20 mg by mouth Daily.    gabapentin (NEURONTIN) 800 MG tablet   Yes Yes    Take 800 mg by mouth Daily.    levothyroxine (SYNTHROID, LEVOTHROID) 100 MCG tablet   Yes Yes    Take 100 mcg by mouth Daily.    melatonin 1 MG tablet   Yes Yes    Take 3 mg by mouth Every Night.    metoprolol succinate XL (TOPROL-XL) 25 MG 24 hr tablet   Yes Yes    Take 25 mg by mouth Daily.    montelukast (SINGULAIR) 10 MG tablet   Yes Yes    Take 10 mg by mouth Every Night.    Multiple Vitamins-Minerals (MULTIVITAMIN ADULT, MINERALS, PO)   Yes Yes    Take 1 tablet by mouth 3 (Three) Times a Week. MWF    Omega-3 Fatty Acids (fish oil) 1000 MG capsule capsule   Yes Yes    Take 1,000 mg by mouth Daily With Breakfast.    simvastatin (ZOCOR) 20 MG tablet   Yes Yes    Take 20 mg by mouth Every Night.    sodium bicarbonate  325 MG tablet   Yes Yes    Take 650 mg by mouth 3 (Three) Times a Day.    tamsulosin (FLOMAX) 0.4 MG capsule 24 hr capsule   Yes Yes    Take 1 capsule by mouth Daily.    vitamin B-12 (CYANOCOBALAMIN) 500 MCG tablet   Yes Yes    Take 500 mcg by mouth Daily.    vitamin D (ERGOCALCIFEROL) 1.25 MG (31190 UT) capsule capsule   Yes Yes    Take 50,000 Units by mouth 1 (One) Time Per Week. On Mon    insulin glargine (LANTUS, SEMGLEE) 100 UNIT/ML injection  Pharmacy Yes No    Inject 30 Units under the skin into the appropriate area as directed Every Night.    Insulin Lispro (humaLOG) 100 UNIT/ML injection  Pharmacy Yes No    Inject 16 Units under the skin into the appropriate area as directed 3 (Three) Times a Day Before Meals.            Medication notes: Pt not interviewed at this time. Called pharmacy to verify SIG and presctription medications. Pt list from home has long and short acting insulin but pharmacy has not filled either script. I have included the most recent SIG, but would recommend starting insulin at a lower dose inpatient.     This medication list is complete to the best of my knowledge as of 12/21/2022    Please call if questions.    Gloria Shirley, PharmJACKI   12/21/2022 08:42 EST

## 2022-12-21 NOTE — PROGRESS NOTES
Plan for sheath removal, possible femoral artery exploration in the OR today.  Discussed risks, including but not limited to bleeding, infection, damage to adjacent structures, worsening of kidney function due to contrast administration, and risks of anesthetic with his wife Jamila via phone.  She expressed understanding and would like to proceed with operative repair.    Noemi Blount MD  Vascular Surgery  [facility]  O: [phone]  F: [phone]

## 2022-12-22 ENCOUNTER — APPOINTMENT (OUTPATIENT)
Dept: GENERAL RADIOLOGY | Facility: HOSPITAL | Age: 77
DRG: 252 | End: 2022-12-22
Payer: MEDICARE

## 2022-12-22 LAB
ACT BLD: 137 SECONDS (ref 82–152)
ACT BLD: 143 SECONDS (ref 82–152)
ACT BLD: 293 SECONDS (ref 82–152)
ALBUMIN SERPL-MCNC: 3.2 G/DL (ref 3.5–5.2)
ANION GAP SERPL CALCULATED.3IONS-SCNC: 8.4 MMOL/L (ref 5–15)
ARTERIAL PATENCY WRIST A: ABNORMAL
ARTERIAL PATENCY WRIST A: ABNORMAL
ATMOSPHERIC PRESS: 748.3 MMHG
ATMOSPHERIC PRESS: 750.2 MMHG
BASE EXCESS BLDA CALC-SCNC: -1.6 MMOL/L (ref 0–2)
BASE EXCESS BLDA CALC-SCNC: -2.4 MMOL/L (ref 0–2)
BDY SITE: ABNORMAL
BDY SITE: ABNORMAL
BUN SERPL-MCNC: 20 MG/DL (ref 8–23)
BUN/CREAT SERPL: 12.5 (ref 7–25)
CALCIUM SPEC-SCNC: 8.3 MG/DL (ref 8.6–10.5)
CHLORIDE SERPL-SCNC: 108 MMOL/L (ref 98–107)
CO2 SERPL-SCNC: 24.6 MMOL/L (ref 22–29)
CREAT BLDA-MCNC: 1.9 MG/DL (ref 0.6–1.3)
CREAT SERPL-MCNC: 1.6 MG/DL (ref 0.76–1.27)
D-LACTATE SERPL-SCNC: 0.8 MMOL/L (ref 0.5–2)
D-LACTATE SERPL-SCNC: 0.9 MMOL/L (ref 0.5–2)
DEPRECATED RDW RBC AUTO: 48.2 FL (ref 37–54)
EGFRCR SERPLBLD CKD-EPI 2021: 43.6 ML/MIN/1.73
ERYTHROCYTE [DISTWIDTH] IN BLOOD BY AUTOMATED COUNT: 12.8 % (ref 12.3–15.4)
GLUCOSE BLDC GLUCOMTR-MCNC: 144 MG/DL (ref 70–130)
GLUCOSE BLDC GLUCOMTR-MCNC: 185 MG/DL (ref 70–130)
GLUCOSE BLDC GLUCOMTR-MCNC: 192 MG/DL (ref 70–130)
GLUCOSE BLDC GLUCOMTR-MCNC: 208 MG/DL (ref 70–130)
GLUCOSE SERPL-MCNC: 166 MG/DL (ref 65–99)
HCO3 BLDA-SCNC: 23.4 MMOL/L (ref 22–28)
HCO3 BLDA-SCNC: 24.1 MMOL/L (ref 22–28)
HCT VFR BLD AUTO: 32.8 % (ref 37.5–51)
HGB BLD-MCNC: 10.8 G/DL (ref 13–17.7)
INHALED O2 CONCENTRATION: 30 %
INHALED O2 CONCENTRATION: 30 %
MCH RBC QN AUTO: 34.1 PG (ref 26.6–33)
MCHC RBC AUTO-ENTMCNC: 32.9 G/DL (ref 31.5–35.7)
MCV RBC AUTO: 103.5 FL (ref 79–97)
MODALITY: ABNORMAL
MODALITY: ABNORMAL
O2 A-A PPRESDIFF RESPIRATORY: 0.3 MMHG
O2 A-A PPRESDIFF RESPIRATORY: 0.4 MMHG
PCO2 BLDA: 43.3 MM HG (ref 35–45)
PCO2 BLDA: 43.8 MM HG (ref 35–45)
PEEP RESPIRATORY: 5 CM[H2O]
PEEP RESPIRATORY: 5 CM[H2O]
PH BLDA: 7.34 PH UNITS (ref 7.35–7.45)
PH BLDA: 7.35 PH UNITS (ref 7.35–7.45)
PHOSPHATE SERPL-MCNC: 1.8 MG/DL (ref 2.5–4.5)
PLATELET # BLD AUTO: 137 10*3/MM3 (ref 140–450)
PMV BLD AUTO: 9.4 FL (ref 6–12)
PO2 BLDA: 55.8 MM HG (ref 80–100)
PO2 BLDA: 60 MM HG (ref 80–100)
POTASSIUM SERPL-SCNC: 3.8 MMOL/L (ref 3.5–5.2)
PSV: 8 CMH2O
RBC # BLD AUTO: 3.17 10*6/MM3 (ref 4.14–5.8)
SAO2 % BLDCOA: 87 % (ref 92–99)
SAO2 % BLDCOA: 89 % (ref 92–99)
SET MECH RESP RATE: 10
SODIUM SERPL-SCNC: 141 MMOL/L (ref 136–145)
TOTAL RATE: 17 BREATHS/MINUTE
TOTAL RATE: 22 BREATHS/MINUTE
VENTILATOR MODE: ABNORMAL
VENTILATOR MODE: AC
VT ON VENT VENT: 500 ML
VT ON VENT VENT: 528 ML
WBC NRBC COR # BLD: 7.86 10*3/MM3 (ref 3.4–10.8)

## 2022-12-22 PROCEDURE — 25010000002 HEPARIN (PORCINE) PER 1000 UNITS: Performed by: STUDENT IN AN ORGANIZED HEALTH CARE EDUCATION/TRAINING PROGRAM

## 2022-12-22 PROCEDURE — 94799 UNLISTED PULMONARY SVC/PX: CPT

## 2022-12-22 PROCEDURE — 25010000002 PROPOFOL 10 MG/ML EMULSION: Performed by: STUDENT IN AN ORGANIZED HEALTH CARE EDUCATION/TRAINING PROGRAM

## 2022-12-22 PROCEDURE — 80069 RENAL FUNCTION PANEL: CPT | Performed by: STUDENT IN AN ORGANIZED HEALTH CARE EDUCATION/TRAINING PROGRAM

## 2022-12-22 PROCEDURE — 71045 X-RAY EXAM CHEST 1 VIEW: CPT

## 2022-12-22 PROCEDURE — 82803 BLOOD GASES ANY COMBINATION: CPT

## 2022-12-22 PROCEDURE — 83605 ASSAY OF LACTIC ACID: CPT | Performed by: INTERNAL MEDICINE

## 2022-12-22 PROCEDURE — 85027 COMPLETE CBC AUTOMATED: CPT | Performed by: STUDENT IN AN ORGANIZED HEALTH CARE EDUCATION/TRAINING PROGRAM

## 2022-12-22 PROCEDURE — 82962 GLUCOSE BLOOD TEST: CPT

## 2022-12-22 PROCEDURE — 25010000002 PIPERACILLIN SOD-TAZOBACTAM PER 1 G: Performed by: STUDENT IN AN ORGANIZED HEALTH CARE EDUCATION/TRAINING PROGRAM

## 2022-12-22 PROCEDURE — 63710000001 INSULIN REGULAR HUMAN PER 5 UNITS: Performed by: STUDENT IN AN ORGANIZED HEALTH CARE EDUCATION/TRAINING PROGRAM

## 2022-12-22 PROCEDURE — 94761 N-INVAS EAR/PLS OXIMETRY MLT: CPT

## 2022-12-22 PROCEDURE — 99232 SBSQ HOSP IP/OBS MODERATE 35: CPT | Performed by: INTERNAL MEDICINE

## 2022-12-22 PROCEDURE — 25010000002 HYDRALAZINE PER 20 MG: Performed by: INTERNAL MEDICINE

## 2022-12-22 PROCEDURE — 94003 VENT MGMT INPAT SUBQ DAY: CPT

## 2022-12-22 PROCEDURE — 94760 N-INVAS EAR/PLS OXIMETRY 1: CPT

## 2022-12-22 RX ORDER — QUETIAPINE FUMARATE 25 MG/1
25 TABLET, FILM COATED ORAL ONCE
Status: COMPLETED | OUTPATIENT
Start: 2022-12-22 | End: 2022-12-22

## 2022-12-22 RX ORDER — ASPIRIN 81 MG/1
81 TABLET ORAL DAILY
Status: DISCONTINUED | OUTPATIENT
Start: 2022-12-22 | End: 2022-12-25

## 2022-12-22 RX ORDER — HYDRALAZINE HYDROCHLORIDE 20 MG/ML
10 INJECTION INTRAMUSCULAR; INTRAVENOUS EVERY 4 HOURS PRN
Status: DISCONTINUED | OUTPATIENT
Start: 2022-12-22 | End: 2022-12-28 | Stop reason: HOSPADM

## 2022-12-22 RX ADMIN — HEPARIN SODIUM 5000 UNITS: 5000 INJECTION INTRAVENOUS; SUBCUTANEOUS at 21:19

## 2022-12-22 RX ADMIN — METOPROLOL TARTRATE 25 MG: 25 TABLET, FILM COATED ORAL at 09:14

## 2022-12-22 RX ADMIN — Medication 10 ML: at 08:10

## 2022-12-22 RX ADMIN — SODIUM CHLORIDE 100 ML/HR: 9 INJECTION, SOLUTION INTRAVENOUS at 18:37

## 2022-12-22 RX ADMIN — METOPROLOL TARTRATE 25 MG: 25 TABLET, FILM COATED ORAL at 21:20

## 2022-12-22 RX ADMIN — TAZOBACTAM SODIUM AND PIPERACILLIN SODIUM 3.38 G: 375; 3 INJECTION, SOLUTION INTRAVENOUS at 07:52

## 2022-12-22 RX ADMIN — HEPARIN SODIUM 5000 UNITS: 5000 INJECTION INTRAVENOUS; SUBCUTANEOUS at 05:53

## 2022-12-22 RX ADMIN — HEPARIN SODIUM 5000 UNITS: 5000 INJECTION INTRAVENOUS; SUBCUTANEOUS at 14:36

## 2022-12-22 RX ADMIN — Medication 10 ML: at 21:19

## 2022-12-22 RX ADMIN — INSULIN HUMAN 3 UNITS: 100 INJECTION, SOLUTION PARENTERAL at 05:53

## 2022-12-22 RX ADMIN — ASPIRIN 81 MG: 81 TABLET, COATED ORAL at 11:27

## 2022-12-22 RX ADMIN — HYDRALAZINE HYDROCHLORIDE 10 MG: 20 INJECTION INTRAMUSCULAR; INTRAVENOUS at 17:09

## 2022-12-22 RX ADMIN — INSULIN HUMAN 3 UNITS: 100 INJECTION, SOLUTION PARENTERAL at 17:19

## 2022-12-22 RX ADMIN — QUETIAPINE FUMARATE 25 MG: 25 TABLET ORAL at 22:52

## 2022-12-22 RX ADMIN — PROPOFOL INJECTABLE EMULSION 20 MCG/KG/MIN: 10 INJECTION, EMULSION INTRAVENOUS at 06:09

## 2022-12-22 RX ADMIN — INSULIN HUMAN 5 UNITS: 100 INJECTION, SOLUTION PARENTERAL at 11:28

## 2022-12-22 RX ADMIN — TAZOBACTAM SODIUM AND PIPERACILLIN SODIUM 3.38 G: 375; 3 INJECTION, SOLUTION INTRAVENOUS at 16:32

## 2022-12-22 NOTE — PROGRESS NOTES
Discharge Planning Assessment  [facility]     Patient Name: Evangelist Garcia  MRN: [id number]  Today's Date: 12/22/2022    Admit Date: 12/20/2022    Plan: Referral to [facility] if needed.   Discharge Needs Assessment     Row Name 12/22/22 1133       Living Environment    People in Home spouse    Current Living Arrangements home    Potentially Unsafe Housing Conditions unable to assess    Primary Care Provided by self    Provides Primary Care For no one    Family Caregiver if Needed spouse;child(jatinder), adult    Quality of Family Relationships supportive    Able to Return to Prior Arrangements yes       Resource/Environmental Concerns    Resource/Environmental Concerns none    Transportation Concerns none       Transition Planning    Patient/Family Anticipates Transition to home with family    Patient/Family Anticipated Services at Transition home health care;rehabilitation services    Transportation Anticipated family or friend will provide       Discharge Needs Assessment    Current Outpatient/Agency/Support Group inpatient rehabilitation facility    Equipment Currently Used at Home cane, straight    Concerns to be Addressed discharge planning    Anticipated Changes Related to Illness none               Discharge Plan     Row Name 12/22/22 1132       Plan    Plan Referral to [facility] if needed.    Plan Comments IMM noted. CCP spoke to patient’s wife Jamila [phone] at bedside.   CCP role explained.  Discharge planning discussed. Face sheet verified.   Pt emergency contact is his wife.  Pt obtains his medications from [facility]  pharmacy in [place].  Pt PCP is Dr. Misa Garcia.  Pt lives in a house with his wife.  Pt is independent with ADL’s.  He uses a cane to ambulate.  He has a history of rehab at [facility].  Wife requested a referral.  He has no past Home Health history.  Plan is Undetermined but SNF referral made.  CCP following              Continued Care and Services  - Admitted Since 12/20/2022     Destination     Service Provider Request Status Selected Services Address Phone Fax Patient Preferred    [facility] Pending - Request Sent N/A [address] [facility] [id number] [phone] --                 Demographic Summary    No documentation.                Functional Status    No documentation.                Psychosocial    No documentation.                Abuse/Neglect    No documentation.                Legal    No documentation.                Substance Abuse    No documentation.                Patient Forms    No documentation.                   Misa Deleon RN

## 2022-12-22 NOTE — ANESTHESIA POSTPROCEDURE EVALUATION
Patient: Evangelist Garcia    Procedure Summary     Date: 12/21/22 Room / Location: [facility] OR 18 [facility] access hospital / [facility] HYBRID OR 18/19    Anesthesia Start: 1011 Anesthesia Stop: 1246    Procedure: ANGIOGRAM   FEMORAL ARTERY WITH OPEN  RIGHT ARTERIAL REPAIR RIGHT FEMORAL CUTDOWN (Right: Thigh) Diagnosis:     Surgeons: Noemi Blount MD Provider: Christiane Wyman MD    Anesthesia Type: general ASA Status: 4          Anesthesia Type: general    Vitals  Vitals Value Taken Time   /68 12/21/22 1346   Temp 36.8 °C (98.3 °F) 12/21/22 1245   Pulse 0 12/21/22 1400   Resp 10 12/21/22 1240   SpO2 100 % 12/21/22 1400   Vitals shown include unvalidated device data.        Post Anesthesia Care and Evaluation    Level of consciousness: awake  Pain management: adequate    Airway patency: patent  Anesthetic complications: No anesthetic complications  PONV Status: none  Cardiovascular status: acceptable  Respiratory status: acceptable  Hydration status: acceptable

## 2022-12-22 NOTE — PROGRESS NOTES
Name: Evangelist Garcia ADMIT: 2022   : 1945  PCP: Misa Garcia MD    MRN: [id number] LOS: 2 days   AGE/SEX: 77 y.o. male  ROOM: [address]    [place], Subsequent Hospital Care    77 y.o. male POD 1 s/p right SFA repair and sheath removal    Extubated this morning.  Wife at bedside.  Patient is drowsy but answers questions, not following commands but moves all extremities.    Scheduled Medications:   heparin (porcine), 5,000 Units, Subcutaneous, Q8H  insulin regular, 0-14 Units, Subcutaneous, Q6H  metoprolol tartrate, 25 mg, Oral, Q12H  piperacillin-tazobactam, 3.375 g, Intravenous, Once  piperacillin-tazobactam, 3.375 g, Intravenous, Q8H  sodium chloride, 10 mL, Intravenous, Q12H      Active Infusions:  EPINEPHrine, 0.02-0.3 mcg/kg/min, Last Rate: Stopped (22 0904)  phenylephrine, 0.5-3 mcg/kg/min, Last Rate: 0.5 mcg/kg/min (22 1240)  propofol, 5-50 mcg/kg/min, Last Rate: 20 mcg/kg/min (22 0609)  sodium chloride, 100 mL/hr, Last Rate: 100 mL/hr (22 2211)      Vital Signs  Vital Signs Patient Vitals for the past 24 hrs:   BP Temp Temp src Pulse Resp SpO2 Weight   22 0900 150/84 100 °F (37.8 °C) Rectal 101 -- 95 % --   22 -- -- -- 105 20 92 % --   22 08 142/85 -- -- 95 -- 95 % --   22 0732 -- -- -- 84 13 95 % --   22 07 119/61 -- -- 86 -- 95 % --   22 06 100/56 -- -- 90 -- 91 % --   22 0534 -- -- -- -- -- -- 114 kg (251 lb 8.7 oz)   22 0530 -- -- -- 89 -- 95 % --   22 0500 121/64 -- -- 87 -- 93 % --   22 0430 -- -- -- 87 -- 92 % --   22 0410 -- -- -- 89 -- 90 % --   22 0400 115/66 99 °F (37.2 °C) Rectal 89 -- 94 % --   22 0330 -- -- -- 88 -- 95 % --   22 0300 101/55 -- -- 96 -- 96 % --   22 0230 -- -- -- 90 -- 96 % --   22 0200 138/76 -- -- 89 -- 97 % --   22 0130 -- -- -- 85 -- 94 % --   22 0100 130/61 -- -- 82 -- 96 % --   22  0030 -- -- -- 82 -- 96 % --   12/22/22 0000 134/59 98.2 °F (36.8 °C) Rectal 80 18 96 % --   12/21/22 2345 -- -- -- 80 -- 92 % --   12/21/22 2330 -- -- -- 81 -- 93 % --   12/21/22 2315 -- -- -- 80 -- 94 % --   12/21/22 2300 124/62 -- -- 80 -- 92 % --   12/21/22 2245 -- -- -- 79 -- 93 % --   12/21/22 2230 -- -- -- 81 -- 94 % --   12/21/22 2215 -- -- -- 84 -- 93 % --   12/21/22 2200 100/64 -- -- 91 -- (!) 88 % --   12/21/22 2130 -- -- -- 84 -- 92 % --   12/21/22 2100 124/93 -- -- 87 -- 91 % --   12/21/22 2015 -- -- -- 81 20 96 % --   12/21/22 2000 134/75 98.1 °F (36.7 °C) Rectal 80 18 95 % --   12/21/22 1947 -- -- -- 85 21 96 % --   12/21/22 1945 -- -- -- 80 -- 95 % --   12/21/22 1700 121/62 -- -- 75 -- 96 % --   12/21/22 1627 -- -- -- 73 16 96 % --   12/21/22 1600 131/74 -- -- 69 -- 98 % --   12/21/22 1500 108/62 -- -- 64 -- 97 % --   12/21/22 1413 -- -- -- 68 15 98 % --   12/21/22 1400 -- -- -- (!) 0 -- 100 % --   12/21/22 1315 132/67 -- -- 67 -- 100 % --   12/21/22 1300 121/66 -- -- 70 -- 99 % --   12/21/22 1255 116/64 -- -- 70 -- 98 % --   12/21/22 1250 96/56 -- -- 72 -- 98 % --   12/21/22 1245 96/51 98.3 °F (36.8 °C) Oral 71 -- 98 % --   12/21/22 1240 (!) 0/0 -- -- 69 10 98 % --     I/O:  I/O last 3 completed shifts:  In: 5818.7 [I.V.:5392.7; Other:120; NG/GT:256; IV Piggyback:50]  Out: 2325 [Urine:2175; Blood:150]  Physical Exam:    Physical Exam   Right groin incision with dressing in place, bilateral palpable pedal pulses, feet warm    CBC    Results from last 7 days   Lab Units 12/22/22  0358 12/21/22  0450 12/20/22  1534   WBC 10*3/mm3 7.86 12.96* 5.88   HEMOGLOBIN g/dL 10.8* 12.4* 12.3*   PLATELETS 10*3/mm3 137* 190 171     BMP   Results from last 7 days   Lab Units 12/22/22  0358 12/21/22  0522 12/20/22  1534   SODIUM mmol/L 141 140 141   POTASSIUM mmol/L 3.8 3.5 4.5   CHLORIDE mmol/L 108* 106 108*   CO2 mmol/L 24.6 17.0* 25.0   BUN mg/dL 20 24* 27*   CREATININE mg/dL 1.60* 1.86* 1.64*   GLUCOSE mg/dL 166*  375* 168*   MAGNESIUM mg/dL  --   --  1.6   PHOSPHORUS mg/dL 1.8*  --   --      Coag   Results from last 7 days   Lab Units 12/21/22  0413 12/20/22  2237   INR   --  1.09   APTT seconds 35.3 31.5     Assessment & Plan   Assessment & Plan    Cardiac arrest (HCC)    77 y.o. male POD 1 open right SFA repair and sheath removal    -will start aspirin 81 mg today (NSAID allergy is not a true allergy- the explanation says \"high creatinine\" which is not a true allergy).    -H/H stable, no leukocytosis.    -plan to take dressing down tomorrow, will need follow up in 2 weeks for staple removal  -etiology of PEA arrest unclear, possibly related to bradycardia.  Cardiology following.    -discussed with wife at bedside      Personal protective equipment used for this patient encounter:  Patient wearing surgical mask []    Provider wearing a surgical mask [x]    Gloves []    Eye protection []    Face Shield []    Gown []    N 95 respirator or CAPR/PAPR []   Duration of interaction 10 mins    Noemi Blount MD  Vascular Surgery  [facility]  O: [phone]  F: [phone]      Please call my office with any question: [phone]    Active Hospital Problems    Diagnosis  POA   • **Cardiac arrest (HCC) [I46.9]  Yes      Resolved Hospital Problems   No resolved problems to display.

## 2022-12-22 NOTE — PLAN OF CARE
Problem: Adult Inpatient Plan of Care  Goal: Plan of Care Review  Flowsheets (Taken 12/22/2022 6893)  Progress: improving  Plan of Care Reviewed With: patient  Outcome Evaluation: In CCU. Extubated. On 3L NC. Agitated and pulling at tubes/lines at times, restraints continued. Multiple BMs. Family updated at bedside.

## 2022-12-22 NOTE — PLAN OF CARE
Goal Outcome Evaluation:   Patient continues to be intubated and ventilated with continuous sedation to optimize ventilation and perfusion. Vascular surgery today - 12/21 - tolerated well. Remains with A-line for close blood pressure monitoring, weaned off pressors today that remain off at this time. Feeding tube inserted today with enteral nutrition started per dietician. Maintenance IVF continued. Will attempt to re-evaluate tomorrow for possibility to extubation readiness. Stable for now and slowly progressing.

## 2022-12-22 NOTE — PROGRESS NOTES
[facility] Follow Up    Chief Complaint: Follow up cardiac arrest    Interval History: Remains intubated but undergoing spontaneous breathing trial and is off of sedation.  He seems little agitated.  Blood pressures and heart rates are up.  He is off the epinephrine.    Objective:     Objective:  Temp:  [98.1 °F (36.7 °C)-99 °F (37.2 °C)] 99 °F (37.2 °C)  Heart Rate:  [0-106] 84  Resp:  [10-24] 13  BP: (0-138)/(0-93) 100/56  Arterial Line BP: ()/(44-64) 134/53  FiO2 (%):  [30 %-100 %] 30 %     Intake/Output Summary (Last 24 hours) at 12/22/2022 0741  Last data filed at 12/22/2022 0609  Gross per 24 hour   Intake 3907 ml   Output 1575 ml   Net 2332 ml     Body mass index is 36.09 kg/m².      12/21/22  0520 12/21/22  1024 12/22/22  0534   Weight: 113 kg (250 lb 3.6 oz) 113 kg (250 lb) 114 kg (251 lb 8.7 oz)     Weight change: -4.491 kg (-9 lb 14.4 oz)      Physical Exam:   General : Intubated  Neuro: Sedated  Lungs: CTAB. Normal respiratory effort and rate.  CV: Regular rate and rhythm, normal S1 and S2, no murmurs, gallops or rubs.  ABD: Soft, nontender, nondistended. Positive bowel sounds.  Extr: No edema or cyanosis, moves all extremities.    Lab Review:   Results from last 7 days   Lab Units 12/21/22  0522 12/20/22  1534   SODIUM mmol/L 140 141   POTASSIUM mmol/L 3.5 4.5   CHLORIDE mmol/L 106 108*   CO2 mmol/L 17.0* 25.0   BUN mg/dL 24* 27*   CREATININE mg/dL 1.86* 1.64*   GLUCOSE mg/dL 375* 168*   CALCIUM mg/dL 8.6 8.6   AST (SGOT) U/L  --  34   ALT (SGPT) U/L  --  21     Results from last 7 days   Lab Units 12/21/22  0522 12/20/22 2124 12/20/22  1534   CK TOTAL U/L 86  --   --    TROPONIN T ng/mL  --  <0.010 <0.010     Results from last 7 days   Lab Units 12/21/22  0450 12/20/22  1534   WBC 10*3/mm3 12.96* 5.88   HEMOGLOBIN g/dL 12.4* 12.3*   HEMATOCRIT % 36.4* 37.3*   PLATELETS 10*3/mm3 190 171     Results from last 7 days   Lab Units 12/21/22  0413 12/20/22  2237   INR   --  1.09    APTT seconds 35.3 31.5     Results from last 7 days   Lab Units 12/20/22  1534   MAGNESIUM mg/dL 1.6           Invalid input(s): LDLCALC  Results from last 7 days   Lab Units 12/20/22  1534   PROBNP pg/mL 77.4         I reviewed the patient's new clinical results.  I personally viewed and interpreted the patient's EKG  Current Medications:   Scheduled Meds:heparin (porcine), 5,000 Units, Subcutaneous, Q8H  insulin regular, 0-14 Units, Subcutaneous, Q6H  piperacillin-tazobactam, 3.375 g, Intravenous, Once  piperacillin-tazobactam, 3.375 g, Intravenous, Q8H  sodium chloride, 10 mL, Intravenous, Q12H      Continuous Infusions:EPINEPHrine, 0.02-0.3 mcg/kg/min, Last Rate: Stopped (12/21/22 0904)  phenylephrine, 0.5-3 mcg/kg/min, Last Rate: 0.5 mcg/kg/min (12/21/22 1240)  propofol, 5-50 mcg/kg/min, Last Rate: 20 mcg/kg/min (12/22/22 0609)  sodium chloride, 100 mL/hr, Last Rate: 100 mL/hr (12/21/22 2211)        Allergies:  Allergies   Allergen Reactions   • Nsaids Other (See Comments)     High cr  High cr         Assessment/Plan:     1. Cardiac arrest.  Recurrent PEA arrest.   Troponin and BNP are normal.  EKG with chronic bifascicular block but otherwise no ischemic changes.  No reports of ventricular arrhythmias.  Repeat EKG unchanged except heart rate has increased.  Echocardiogram unremarkable.  2. Bradycardia.  Reportedly preceded by bradycardia and possible pauses but no strips to review available.  No definite heart block noted or reported.    No further recurrent episodes of bradycardia.  3. Peripheral vascular disease.  Status post left lower extremity intervention.  Now status post right lower extremity intervention with a right femoral antegrade access.  Vascular surgery is following.  4.  History of recurrent DVT.  CT angiogram of the chest here shows no evidence of pulmonary embolism.  5.  History of hypertension  6.  Hyperlipidemia  7.  Diabetes mellitus type 2  8.  Chronic kidney disease  9.  Obesity    -  The patient was on metoprolol at home.  We will resume this is metoprolol to tartrate so we can monitor his heart rate response.  If he does well with this we will switch him back to metoprolol succinate as he was taking at home.    Flor Grande MD  12/22/22  07:41 EST

## 2022-12-22 NOTE — PROGRESS NOTES
[facility]  [phone]  Dr. Howie Christina    Subjective:  LOS: 2    Chief Complaint: Resuscitated cardiac arrest    Examined this morning. Lightly sedated and on ventilator. Attempts to open eyes etc.    Objective   Vital Signs past 24hrs    Temp range: Temp (24hrs), Av.7 °F (37.1 °C), Min:98.1 °F (36.7 °C), Max:100 °F (37.8 °C)    BP range: BP: (0-150)/(0-93) 150/84  Pulse range: Heart Rate:  [0-105] 101  Resp rate range: Resp:  [10-24] 20    Device (Oxygen Therapy): nasal cannulaFlow (L/min):  [6] 6  Oxygen range:SpO2:  [0 %-100 %] 95 %   FiO2 (%):  [30 %-100 %] 30 %  S RR:  [10] 10  PEEP/CPAP (cm H2O):  [5 cm H20] 5 cm H20  MAP (cm H2O):  [7.6-11] 11  114 kg (251 lb 8.7 oz); Body mass index is 36.09 kg/m².    Intake/Output Summary (Last 24 hours) at 2022 0932  Last data filed at 2022 0609  Gross per 24 hour   Intake 3907 ml   Output 1575 ml   Net 2332 ml       Physical Exam  Constitutional:       Interventions: He is sedated, intubated and restrained.   Eyes:      Pupils: Pupils are equal, round, and reactive to light.   Cardiovascular:      Rate and Rhythm: Normal rate and regular rhythm.      Heart sounds: No murmur heard.  Pulmonary:      Effort: Pulmonary effort is normal. He is intubated.      Breath sounds: Normal breath sounds.   Abdominal:      General: Bowel sounds are normal.      Palpations: Abdomen is soft. There is no mass.      Tenderness: There is no abdominal tenderness.   Musculoskeletal:         General: No swelling.       Results Review:    I have reviewed the laboratory and imaging data since the last note by [facility] physician.  My annotations are noted in assessment and plan.    Medication Review:  I have reviewed the current MAR.  My annotations are noted in assessment and plan.    EPINEPHrine, 0.02-0.3 mcg/kg/min, Last Rate: Stopped (22 0904)  phenylephrine, 0.5-3 mcg/kg/min, Last Rate: 0.5 mcg/kg/min (22 1240)  propofol, 5-50 mcg/kg/min, Last Rate:  20 mcg/kg/min (12/22/22 0609)  sodium chloride, 100 mL/hr, Last Rate: 100 mL/hr (12/21/22 2211)      Plan   Lines, Drains & Airways     Active LDAs     Name Placement date Placement time Site Days    Peripheral IV 12/20/22 1532 Anterior;Left Forearm 12/20/22  1532  Forearm  less than 1    Peripheral IV 12/20/22 1656 Anterior;Proximal;Right;Upper Arm 12/20/22  1656  Arm  less than 1    Peripheral IV 12/20/22 1652 Anterior;Left;Upper Arm 12/20/22  1652  Arm  less than 1    Urethral Catheter Non-latex 16 Fr. 12/20/22  1901  -- less than 1    ETT  12/20/22  1521  -- less than 1    Arterial Line 12/20/22 Right Femoral 12/20/22  --  Femoral  1              PCCM Problems  Resuscitated cardiac arrest with PEA and bradycardia  Acute respiratory failure, mechanical ventilation  Severe acute metabolic lactic acidosis  Right femoral artery revascularization procedure, 12/20/2022  Bilateral infiltrates, ? aspiration  Relevant Medical Diagnoses  Left femoral artery revascularization procedure 12-22  Recurrent DVT on apixaban  Diabetes type 2  Hypertension  CKD 3B  Obesity  DONOVAN on CPAP        Plan of Treatment    Resuscitated cardiac arrest and normal echocardiogram.  No other intervention for now per cardiology. Add meds for BP elevated.    Ventilator adjusted to PS mode and tolerating. Weaning jeff were reviewed including ABG and patient was successfully extubated after.    Severe acute metabolic lactic acidosis resolved.    Sheath in right groin is out.    Bilateral infiltrates and patient is on Zosyn and vancomycin to cover for possible aspiration.    Mental status appears to be alert and oriented.    CKD 3B and currently creatinine looks like its baseline. Cautious for phosphorus replacement.    Patient is on apixaban for history of recurrent DVTs.  Resume AC if OK with vascular.    Spoke to wife at bedside.    I spent 35 mins critical care time in care of this patient outside of any procedures.     Howie Christina,  MD  12/22/22  09:32 EST      Part of this note may be an electronic transcription/translation of spoken language to printed text using the Dragon Dictation System.

## 2022-12-22 NOTE — PROGRESS NOTES
Discussed with Dr. Christina.  Indication for anticoagulation is a history of DVT.  Patient is currently on prophylactic dose heparin.  Would continue mechanical prophylaxis as well.  Would recommend holding off on therapeutic anticoagulation for a day or 2 at least until we are sure that the wound is doing well.

## 2022-12-22 NOTE — DISCHARGE PLACEMENT REQUEST
Enedina Gooden (77 y.o. Male)     Date of Birth   1945    Social Security Number       Address   [place] [address]    Home Phone   [phone]    MRN   [id number]       Restorationist   [employer]    Marital Status                               Admission Date   12/20/22    Admission Type   Emergency    Admitting Provider   Jose Salmon MD    Attending Provider   Howie Christina MD    Department, Room/Bed   [facility], N336/1       Discharge Date       Discharge Disposition       Discharge Destination                               Attending Provider: Howie Christina MD    Allergies: Nsaids    Isolation: None   Infection: None   Code Status: CPR    Ht: 177.8 cm (70\")   Wt: 114 kg (251 lb 8.7 oz)    Admission Cmt: None   Principal Problem: Cardiac arrest (HCC) [I46.9]                 Active Insurance as of 12/20/2022     Primary Coverage     Payor Plan Insurance Group Employer/Plan Group    MEDICARE MEDICARE A & B      Payor Plan Address Payor Plan Phone Number Payor Plan Fax Number Effective Dates    [address] [phone]  4/1/2008 - None Entered    [facility] [id number]       Subscriber Name Subscriber Birth Date Member ID       ENEDINA GOODEN 1945 [id number]           Secondary Coverage     Payor Plan Insurance Group Employer/Plan Group    [facility] SUPP [id number]     Payor Plan Address Payor Plan Phone Number Payor Plan Fax Number Effective Dates    [address]   12/1/2016 - None Entered    [facility] [id number]       Subscriber Name Subscriber Birth Date Member ID       ENEDINA GOODEN 1945 [id number]                 Emergency Contacts      (Rel.) Home Phone Work Phone Mobile Phone    JoseJamila (Spouse) [phone] -- --    MariliaSofy (Daughter) -- -- [phone]

## 2022-12-22 NOTE — ADDENDUM NOTE
Addendum  created 12/22/22 0828 by Braydon Ricci MD    Review and Sign - Ready for Procedure, Review and Sign - Signed

## 2022-12-23 LAB
ALBUMIN SERPL-MCNC: 3.1 G/DL (ref 3.5–5.2)
ANION GAP SERPL CALCULATED.3IONS-SCNC: 6.2 MMOL/L (ref 5–15)
BUN SERPL-MCNC: 17 MG/DL (ref 8–23)
BUN/CREAT SERPL: 11.7 (ref 7–25)
CALCIUM SPEC-SCNC: 8.3 MG/DL (ref 8.6–10.5)
CHLORIDE SERPL-SCNC: 107 MMOL/L (ref 98–107)
CO2 SERPL-SCNC: 25.8 MMOL/L (ref 22–29)
CREAT SERPL-MCNC: 1.45 MG/DL (ref 0.76–1.27)
DEPRECATED RDW RBC AUTO: 48.5 FL (ref 37–54)
EGFRCR SERPLBLD CKD-EPI 2021: 49 ML/MIN/1.73
ERYTHROCYTE [DISTWIDTH] IN BLOOD BY AUTOMATED COUNT: 12.8 % (ref 12.3–15.4)
GLUCOSE BLDC GLUCOMTR-MCNC: 146 MG/DL (ref 70–130)
GLUCOSE BLDC GLUCOMTR-MCNC: 146 MG/DL (ref 70–130)
GLUCOSE BLDC GLUCOMTR-MCNC: 153 MG/DL (ref 70–130)
GLUCOSE BLDC GLUCOMTR-MCNC: 158 MG/DL (ref 70–130)
GLUCOSE SERPL-MCNC: 142 MG/DL (ref 65–99)
HCT VFR BLD AUTO: 30.7 % (ref 37.5–51)
HGB BLD-MCNC: 10.1 G/DL (ref 13–17.7)
MCH RBC QN AUTO: 34.2 PG (ref 26.6–33)
MCHC RBC AUTO-ENTMCNC: 32.9 G/DL (ref 31.5–35.7)
MCV RBC AUTO: 104.1 FL (ref 79–97)
PHOSPHATE SERPL-MCNC: 1.6 MG/DL (ref 2.5–4.5)
PHOSPHATE SERPL-MCNC: 1.7 MG/DL (ref 2.5–4.5)
PLATELET # BLD AUTO: 119 10*3/MM3 (ref 140–450)
PMV BLD AUTO: 9.1 FL (ref 6–12)
POTASSIUM SERPL-SCNC: 3.3 MMOL/L (ref 3.5–5.2)
POTASSIUM SERPL-SCNC: 3.5 MMOL/L (ref 3.5–5.2)
POTASSIUM SERPL-SCNC: 3.8 MMOL/L (ref 3.5–5.2)
RBC # BLD AUTO: 2.95 10*6/MM3 (ref 4.14–5.8)
SODIUM SERPL-SCNC: 139 MMOL/L (ref 136–145)
WBC NRBC COR # BLD: 7.21 10*3/MM3 (ref 3.4–10.8)

## 2022-12-23 PROCEDURE — 84132 ASSAY OF SERUM POTASSIUM: CPT | Performed by: INTERNAL MEDICINE

## 2022-12-23 PROCEDURE — 25010000002 HEPARIN (PORCINE) PER 1000 UNITS: Performed by: STUDENT IN AN ORGANIZED HEALTH CARE EDUCATION/TRAINING PROGRAM

## 2022-12-23 PROCEDURE — 63710000001 INSULIN REGULAR HUMAN PER 5 UNITS: Performed by: STUDENT IN AN ORGANIZED HEALTH CARE EDUCATION/TRAINING PROGRAM

## 2022-12-23 PROCEDURE — 84100 ASSAY OF PHOSPHORUS: CPT | Performed by: INTERNAL MEDICINE

## 2022-12-23 PROCEDURE — 25010000002 PIPERACILLIN SOD-TAZOBACTAM PER 1 G: Performed by: STUDENT IN AN ORGANIZED HEALTH CARE EDUCATION/TRAINING PROGRAM

## 2022-12-23 PROCEDURE — 80069 RENAL FUNCTION PANEL: CPT | Performed by: STUDENT IN AN ORGANIZED HEALTH CARE EDUCATION/TRAINING PROGRAM

## 2022-12-23 PROCEDURE — 85027 COMPLETE CBC AUTOMATED: CPT | Performed by: STUDENT IN AN ORGANIZED HEALTH CARE EDUCATION/TRAINING PROGRAM

## 2022-12-23 PROCEDURE — 82962 GLUCOSE BLOOD TEST: CPT

## 2022-12-23 PROCEDURE — 99232 SBSQ HOSP IP/OBS MODERATE 35: CPT | Performed by: INTERNAL MEDICINE

## 2022-12-23 RX ORDER — GABAPENTIN 400 MG/1
400 CAPSULE ORAL EVERY 12 HOURS SCHEDULED
Status: DISCONTINUED | OUTPATIENT
Start: 2022-12-23 | End: 2022-12-26 | Stop reason: CLARIF

## 2022-12-23 RX ORDER — POTASSIUM CHLORIDE 1.5 G/1.77G
20 POWDER, FOR SOLUTION ORAL ONCE
Status: COMPLETED | OUTPATIENT
Start: 2022-12-23 | End: 2022-12-23

## 2022-12-23 RX ORDER — POTASSIUM CHLORIDE 750 MG/1
40 TABLET, FILM COATED, EXTENDED RELEASE ORAL AS NEEDED
Status: DISCONTINUED | OUTPATIENT
Start: 2022-12-23 | End: 2022-12-28 | Stop reason: HOSPADM

## 2022-12-23 RX ORDER — POTASSIUM CHLORIDE 7.45 MG/ML
10 INJECTION INTRAVENOUS
Status: DISCONTINUED | OUTPATIENT
Start: 2022-12-23 | End: 2022-12-28 | Stop reason: HOSPADM

## 2022-12-23 RX ORDER — POTASSIUM CHLORIDE 1.5 G/1.77G
40 POWDER, FOR SOLUTION ORAL AS NEEDED
Status: DISCONTINUED | OUTPATIENT
Start: 2022-12-23 | End: 2022-12-28 | Stop reason: HOSPADM

## 2022-12-23 RX ORDER — LEVOTHYROXINE SODIUM 0.1 MG/1
100 TABLET ORAL DAILY
Status: DISCONTINUED | OUTPATIENT
Start: 2022-12-23 | End: 2022-12-28 | Stop reason: HOSPADM

## 2022-12-23 RX ADMIN — TAZOBACTAM SODIUM AND PIPERACILLIN SODIUM 3.38 G: 375; 3 INJECTION, SOLUTION INTRAVENOUS at 08:07

## 2022-12-23 RX ADMIN — HEPARIN SODIUM 5000 UNITS: 5000 INJECTION INTRAVENOUS; SUBCUTANEOUS at 05:34

## 2022-12-23 RX ADMIN — METOPROLOL TARTRATE 25 MG: 25 TABLET, FILM COATED ORAL at 08:07

## 2022-12-23 RX ADMIN — INSULIN HUMAN 3 UNITS: 100 INJECTION, SOLUTION PARENTERAL at 00:34

## 2022-12-23 RX ADMIN — ASPIRIN 81 MG: 81 TABLET, COATED ORAL at 08:07

## 2022-12-23 RX ADMIN — HEPARIN SODIUM 5000 UNITS: 5000 INJECTION INTRAVENOUS; SUBCUTANEOUS at 14:00

## 2022-12-23 RX ADMIN — METOPROLOL TARTRATE 12.5 MG: 25 TABLET, FILM COATED ORAL at 22:28

## 2022-12-23 RX ADMIN — POTASSIUM CHLORIDE 20 MEQ: 1.5 POWDER, FOR SOLUTION ORAL at 08:13

## 2022-12-23 RX ADMIN — TAZOBACTAM SODIUM AND PIPERACILLIN SODIUM 3.38 G: 375; 3 INJECTION, SOLUTION INTRAVENOUS at 00:34

## 2022-12-23 RX ADMIN — SODIUM PHOSPHATE, MONOBASIC, MONOHYDRATE AND SODIUM PHOSPHATE, DIBASIC, ANHYDROUS 15 MMOL: 276; 142 INJECTION, SOLUTION INTRAVENOUS at 16:27

## 2022-12-23 RX ADMIN — INSULIN HUMAN 3 UNITS: 100 INJECTION, SOLUTION PARENTERAL at 17:46

## 2022-12-23 RX ADMIN — SODIUM CHLORIDE 100 ML/HR: 9 INJECTION, SOLUTION INTRAVENOUS at 14:39

## 2022-12-23 RX ADMIN — SODIUM CHLORIDE 100 ML/HR: 9 INJECTION, SOLUTION INTRAVENOUS at 04:17

## 2022-12-23 RX ADMIN — TAZOBACTAM SODIUM AND PIPERACILLIN SODIUM 3.38 G: 375; 3 INJECTION, SOLUTION INTRAVENOUS at 15:32

## 2022-12-23 RX ADMIN — HEPARIN SODIUM 5000 UNITS: 5000 INJECTION INTRAVENOUS; SUBCUTANEOUS at 21:27

## 2022-12-23 RX ADMIN — GABAPENTIN 400 MG: 400 CAPSULE ORAL at 21:27

## 2022-12-23 RX ADMIN — Medication 10 ML: at 08:07

## 2022-12-23 RX ADMIN — Medication 2 PACKET: at 06:07

## 2022-12-23 RX ADMIN — Medication 10 ML: at 21:27

## 2022-12-23 RX ADMIN — GABAPENTIN 400 MG: 400 CAPSULE ORAL at 10:51

## 2022-12-23 RX ADMIN — LEVOTHYROXINE SODIUM 100 MCG: 0.1 TABLET ORAL at 10:51

## 2022-12-23 NOTE — PROGRESS NOTES
Name: Evangelist Garcia ADMIT: 2022   : 1945  PCP: Misa Garcia MD    MRN: [id number] LOS: 3 days   AGE/SEX: 77 y.o. male  ROOM:  [place] [id number]     CC: s/p open removal of right femoral sheath  Interval History: Patient extubated.  Remains confused.  Subjective   Subjective     Review of Systems  Objective   Objective     Vitals:   Temp:  [97.2 °F (36.2 °C)-98.8 °F (37.1 °C)] 97.6 °F (36.4 °C)  Heart Rate:  [64-97] 74  Resp:  [14-24] 16  BP: (104-152)/(60-81) 128/61  Arterial Line BP: (106-171)/(56-70) 164/64    No intake/output data recorded.    Scheduled Meds:     aspirin, 81 mg, Oral, Daily  gabapentin, 400 mg, Oral, Q12H  heparin (porcine), 5,000 Units, Subcutaneous, Q8H  insulin regular, 0-14 Units, Subcutaneous, Q6H  levothyroxine, 100 mcg, Oral, Daily  metoprolol tartrate, 25 mg, Oral, Q12H  piperacillin-tazobactam, 3.375 g, Intravenous, Once  piperacillin-tazobactam, 3.375 g, Intravenous, Q8H  sodium chloride, 10 mL, Intravenous, Q12H      IV Meds:   sodium chloride, 100 mL/hr, Last Rate: 100 mL/hr (22 0417)        Physical Exam  Vascular: Obese.  Dressing removed.  Incision without erythema or drainage.    Data Reviewed:  CBC    Results from last 7 days   Lab Units 22  0354 22  0358 22  0450 22  1534   WBC 10*3/mm3 7.21 7.86 12.96* 5.88   HEMOGLOBIN g/dL 10.1* 10.8* 12.4* 12.3*   PLATELETS 10*3/mm3 119* 137* 190 171     BMP   Results from last 7 days   Lab Units 22  0354 22  0358 22  0522 22  1559 22  1534   SODIUM mmol/L 139 141 140  --  141   POTASSIUM mmol/L 3.5  3.3* 3.8 3.5  --  4.5   CHLORIDE mmol/L 107 108* 106  --  108*   CO2 mmol/L 25.8 24.6 17.0*  --  25.0   BUN mg/dL 17 20 24*  --  27*   CREATININE mg/dL 1.45* 1.60* 1.86* 1.90* 1.64*   GLUCOSE mg/dL 142* 166* 375*  --  168*   MAGNESIUM mg/dL  --   --   --   --  1.6   PHOSPHORUS mg/dL 1.6* 1.8*  --   --   --      Radiology(recent) No radiology results for the  last day    Most notable findings include: Hemoglobin stable at 10.1.  Creatinine improved with hydration to 1.45.    Active Hospital Problems:   Active Hospital Problems    Diagnosis  POA   • **Cardiac arrest (HCC) [I46.9]  Yes      Resolved Hospital Problems   No resolved problems to display.      Assessment & Plan   Billin, Post Op Global  Assessment / Plan     Postop day #2 status post open removal of antegrade access right common femoral artery sheath and primary repair: Incision looks good and staples without any evidence of skin separation, edge necrosis, or drainage.  However, patient is obese and I would still consider this high risk wound.  Will write orders to paint with Betadine daily, allowed to dry, and try to keep an ABD in the groin crease to avoid moisture.    Chronic anticoagulation due to history of DVT: Okay to restart home anticoagulation from a vascular standpoint.    Kane Earl MD  22  11:58 EST  Office Number [phone]

## 2022-12-23 NOTE — PROGRESS NOTES
[facility] Follow Up    Chief Complaint: Follow up cardiac arrest    Interval History: He denies any chest pain or shortness of breath.  He is slow to answer questions today.  Tolerating metoprolol so far without any issues with bradycardia or pauses.    Objective:     Objective:  Temp:  [97.2 °F (36.2 °C)-100 °F (37.8 °C)] 98.2 °F (36.8 °C)  Heart Rate:  [] 64  Resp:  [14-24] 16  BP: (104-152)/(60-84) 127/66  Arterial Line BP: (106-175)/(50-70) 164/64     Intake/Output Summary (Last 24 hours) at 12/23/2022 0822  Last data filed at 12/23/2022 0525  Gross per 24 hour   Intake 3171 ml   Output 2050 ml   Net 1121 ml     Body mass index is 34.95 kg/m².      12/21/22  1024 12/22/22  0534 12/23/22  0445   Weight: 113 kg (250 lb) 114 kg (251 lb 8.7 oz) 110 kg (243 lb 9.7 oz)     Weight change: -2.899 kg (-6 lb 6.3 oz)      Physical Exam:   General : Alert, cooperative, in no acute distress.  Neuro: Alert,cooperative and oriented.  Lungs: CTAB. Normal respiratory effort and rate.  CV: Regular rate and rhythm, normal S1 and S2, no murmurs, gallops or rubs.  ABD: Soft, nontender, nondistended. Positive bowel sounds.  Extr: No edema or cyanosis, moves all extremities.    Lab Review:   Results from last 7 days   Lab Units 12/23/22  0354 12/22/22  0358 12/20/22  1559 12/20/22  1534   SODIUM mmol/L 139 141   < > 141   POTASSIUM mmol/L 3.3* 3.8   < > 4.5   CHLORIDE mmol/L 107 108*   < > 108*   CO2 mmol/L 25.8 24.6   < > 25.0   BUN mg/dL 17 20   < > 27*   CREATININE mg/dL 1.45* 1.60*   < > 1.64*   GLUCOSE mg/dL 142* 166*   < > 168*   CALCIUM mg/dL 8.3* 8.3*   < > 8.6   AST (SGOT) U/L  --   --   --  34   ALT (SGPT) U/L  --   --   --  21    < > = values in this interval not displayed.     Results from last 7 days   Lab Units 12/21/22  0522 12/20/22 2124 12/20/22  1534   CK TOTAL U/L 86  --   --    TROPONIN T ng/mL  --  <0.010 <0.010     Results from last 7 days   Lab Units 12/23/22  0354 12/22/22  0358    WBC 10*3/mm3 7.21 7.86   HEMOGLOBIN g/dL 10.1* 10.8*   HEMATOCRIT % 30.7* 32.8*   PLATELETS 10*3/mm3 119* 137*     Results from last 7 days   Lab Units 12/21/22 0413 12/20/22  2237   INR   --  1.09   APTT seconds 35.3 31.5     Results from last 7 days   Lab Units 12/20/22  1534   MAGNESIUM mg/dL 1.6           Invalid input(s): LDLCALC  Results from last 7 days   Lab Units 12/20/22  1534   PROBNP pg/mL 77.4         I reviewed the patient's new clinical results.  I personally viewed and interpreted the patient's EKG  Current Medications:   Scheduled Meds:aspirin, 81 mg, Oral, Daily  heparin (porcine), 5,000 Units, Subcutaneous, Q8H  insulin regular, 0-14 Units, Subcutaneous, Q6H  metoprolol tartrate, 25 mg, Oral, Q12H  piperacillin-tazobactam, 3.375 g, Intravenous, Once  piperacillin-tazobactam, 3.375 g, Intravenous, Q8H  sodium chloride, 10 mL, Intravenous, Q12H      Continuous Infusions:sodium chloride, 100 mL/hr, Last Rate: 100 mL/hr (12/23/22 0417)        Allergies:  Allergies   Allergen Reactions   • Nsaids Other (See Comments)     High cr  High cr         Assessment/Plan:     1. Cardiac arrest.  Recurrent PEA arrest.   Troponin and BNP are normal.  EKG with chronic bifascicular block but otherwise no ischemic changes.  No reports of ventricular arrhythmias.   Echocardiogram unremarkable.  2. Bradycardia.  Reportedly preceded by bradycardia and possible pauses but no strips to review available.  No definite heart block noted or reported.    No further recurrent episodes of bradycardia despite resuming metoprolol.  Wonder if bradycardia was due to severe vagal response.  3. Peripheral vascular disease.  Status post left lower extremity intervention.  Now status post right lower extremity intervention with a right femoral antegrade access.  Vascular surgery is following.  4.  History of recurrent DVT.  CT angiogram of the chest here shows no evidence of pulmonary embolism.  5.  History of hypertension  6.   Hyperlipidemia  7.  Diabetes mellitus type 2  8.  Chronic kidney disease  9.  Obesity    - Continue current dose of metoprolol and monitor for any further evidence of bradycardia or pauses.  -  Might consider placement of a monitor at discharge or as an outpatient to ensure he has no further issues with bradycardia or pauses.  - Otherwise no plans for further cardiac work-up or change in management at this time.  -Okay for transfer to telemetry from cardiac standpoint.    Flor Grande MD  12/23/22  08:22 EST

## 2022-12-23 NOTE — PROGRESS NOTES
[facility]  [phone]  Dr. Howie Christina    Subjective:  LOS: 3    Chief Complaint: Resuscitated cardiac arrest    Awake and answers questions.  Not currently agitated but restless and pulling at tubes and lines overnight.  States he does not know where he is but orients when told that he is at [facility].    Objective   Vital Signs past 24hrs    Temp range: Temp (24hrs), Av.2 °F (36.8 °C), Min:97.2 °F (36.2 °C), Max:100 °F (37.8 °C)    BP range: BP: (104-152)/(60-84) 126/73  Pulse range: Heart Rate:  [] 80  Resp rate range: Resp:  [14-24] 16    Device (Oxygen Therapy): nasal cannulaFlow (L/min):  [2-6] 2  Oxygen range:SpO2:  [94 %-99 %] 96 %      110 kg (243 lb 9.7 oz); Body mass index is 34.95 kg/m².    Intake/Output Summary (Last 24 hours) at 2022 0849  Last data filed at 2022 0525  Gross per 24 hour   Intake 3171 ml   Output 2050 ml   Net 1121 ml       Physical Exam  Constitutional:       Interventions: He is restrained.   Eyes:      Pupils: Pupils are equal, round, and reactive to light.   Cardiovascular:      Rate and Rhythm: Normal rate and regular rhythm.      Heart sounds: No murmur heard.  Pulmonary:      Effort: Pulmonary effort is normal.      Breath sounds: Normal breath sounds.   Abdominal:      General: Bowel sounds are normal.      Palpations: Abdomen is soft. There is no mass.      Tenderness: There is no abdominal tenderness.   Musculoskeletal:         General: No swelling.       Results Review:    I have reviewed the laboratory and imaging data since the last note by [facility] physician.  My annotations are noted in assessment and plan.    Medication Review:  I have reviewed the current MAR.  My annotations are noted in assessment and plan.    sodium chloride, 100 mL/hr, Last Rate: 100 mL/hr (22 3797)      Plan   Lines, Drains & Airways     Active LDAs     Name Placement date Placement time Site Days    Peripheral IV 22 1532 Anterior;Left  Forearm 12/20/22  1532  Forearm  less than 1    Peripheral IV 12/20/22 1656 Anterior;Proximal;Right;Upper Arm 12/20/22  1656  Arm  less than 1    Peripheral IV 12/20/22 1652 Anterior;Left;Upper Arm 12/20/22  1652  Arm  less than 1    Urethral Catheter Non-latex 16 Fr. 12/20/22  1901  -- less than 1    ETT  12/20/22  1521  -- less than 1    Arterial Line 12/20/22 Right Femoral 12/20/22  --  Femoral  1              PCCM Problems  Resuscitated cardiac arrest with PEA and bradycardia  Acute respiratory failure, mechanical ventilation  Severe acute metabolic lactic acidosis  Right femoral artery revascularization procedure, 12/20/2022  Bilateral infiltrates, ? 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  No other intervention for now per cardiology.  Blood pressure now better controlled.    Hyperdynamic LV on echocardiography.  Continue IV fluids for now.    Tolerating extubation.    Bilateral infiltrates and patient is on Zosyn and vancomycin to cover for possible aspiration.    Some confusion due to acute metabolic encephalopathy.  Expected to improve.    CKD 3B and currently creatinine looks like its baseline.  Replace electrolytes.    Hold on full anticoagulation for now.  History recurrent DVTs.    Continue heparin subcu for DVT prophylaxis.    Transfer floors if OK with vascular.    Howie Christina MD  12/23/22  08:49 EST      Part of this note may be an electronic transcription/translation of spoken language to printed text using the Dragon Dictation System.

## 2022-12-23 NOTE — PLAN OF CARE
Goal Outcome Evaluation:              Discussed with RN. Patient not appropriate for swallow eval at this time secondary to mental status. SLP to follow for eval as appropriate.

## 2022-12-23 NOTE — PLAN OF CARE
Goal Outcome Evaluation:      VSS. ART line d/c'ed. Restraints - continuously pulling/localizing to lines. Seroquel x1 d/t restlessness and agitation.

## 2022-12-23 NOTE — PLAN OF CARE
Problem: Adult Inpatient Plan of Care  Goal: Plan of Care Review  Outcome: Ongoing, Progressing  Flowsheets (Taken 12/23/2022 1702)  Progress: improving  Plan of Care Reviewed With: patient  Outcome Evaluation: In CCU. On room air. Disoriented. BM x2. Restraints discontinued. Wound care provided per order. Potassium and phosphorus replacement. Spouse updated via telephone.

## 2022-12-24 ENCOUNTER — APPOINTMENT (OUTPATIENT)
Dept: GENERAL RADIOLOGY | Facility: HOSPITAL | Age: 77
DRG: 252 | End: 2022-12-24
Payer: MEDICARE

## 2022-12-24 LAB
ALBUMIN SERPL-MCNC: 3.1 G/DL (ref 3.5–5.2)
ANION GAP SERPL CALCULATED.3IONS-SCNC: 10.5 MMOL/L (ref 5–15)
BUN SERPL-MCNC: 21 MG/DL (ref 8–23)
BUN/CREAT SERPL: 15.1 (ref 7–25)
CALCIUM SPEC-SCNC: 8.5 MG/DL (ref 8.6–10.5)
CHLORIDE SERPL-SCNC: 109 MMOL/L (ref 98–107)
CO2 SERPL-SCNC: 23.5 MMOL/L (ref 22–29)
CREAT SERPL-MCNC: 1.39 MG/DL (ref 0.76–1.27)
DEPRECATED RDW RBC AUTO: 47.1 FL (ref 37–54)
EGFRCR SERPLBLD CKD-EPI 2021: 51.6 ML/MIN/1.73
ERYTHROCYTE [DISTWIDTH] IN BLOOD BY AUTOMATED COUNT: 12.9 % (ref 12.3–15.4)
GLUCOSE BLDC GLUCOMTR-MCNC: 139 MG/DL (ref 70–130)
GLUCOSE BLDC GLUCOMTR-MCNC: 151 MG/DL (ref 70–130)
GLUCOSE BLDC GLUCOMTR-MCNC: 164 MG/DL (ref 70–130)
GLUCOSE BLDC GLUCOMTR-MCNC: 172 MG/DL (ref 70–130)
GLUCOSE SERPL-MCNC: 166 MG/DL (ref 65–99)
HCT VFR BLD AUTO: 31.3 % (ref 37.5–51)
HGB BLD-MCNC: 10.9 G/DL (ref 13–17.7)
MAGNESIUM SERPL-MCNC: 1.6 MG/DL (ref 1.6–2.4)
MCH RBC QN AUTO: 34.8 PG (ref 26.6–33)
MCHC RBC AUTO-ENTMCNC: 34.8 G/DL (ref 31.5–35.7)
MCV RBC AUTO: 100 FL (ref 79–97)
PHOSPHATE SERPL-MCNC: 2 MG/DL (ref 2.5–4.5)
PLATELET # BLD AUTO: 135 10*3/MM3 (ref 140–450)
PMV BLD AUTO: 9.3 FL (ref 6–12)
POTASSIUM SERPL-SCNC: 3.6 MMOL/L (ref 3.5–5.2)
RBC # BLD AUTO: 3.13 10*6/MM3 (ref 4.14–5.8)
SODIUM SERPL-SCNC: 143 MMOL/L (ref 136–145)
WBC NRBC COR # BLD: 6.65 10*3/MM3 (ref 3.4–10.8)

## 2022-12-24 PROCEDURE — 92610 EVALUATE SWALLOWING FUNCTION: CPT

## 2022-12-24 PROCEDURE — 0 MAGNESIUM SULFATE 4 GM/100ML SOLUTION

## 2022-12-24 PROCEDURE — 63710000001 INSULIN REGULAR HUMAN PER 5 UNITS: Performed by: STUDENT IN AN ORGANIZED HEALTH CARE EDUCATION/TRAINING PROGRAM

## 2022-12-24 PROCEDURE — 85027 COMPLETE CBC AUTOMATED: CPT | Performed by: STUDENT IN AN ORGANIZED HEALTH CARE EDUCATION/TRAINING PROGRAM

## 2022-12-24 PROCEDURE — 94799 UNLISTED PULMONARY SVC/PX: CPT

## 2022-12-24 PROCEDURE — 74018 RADEX ABDOMEN 1 VIEW: CPT

## 2022-12-24 PROCEDURE — 25010000002 HEPARIN (PORCINE) PER 1000 UNITS: Performed by: STUDENT IN AN ORGANIZED HEALTH CARE EDUCATION/TRAINING PROGRAM

## 2022-12-24 PROCEDURE — 25010000002 PIPERACILLIN SOD-TAZOBACTAM PER 1 G: Performed by: STUDENT IN AN ORGANIZED HEALTH CARE EDUCATION/TRAINING PROGRAM

## 2022-12-24 PROCEDURE — 99232 SBSQ HOSP IP/OBS MODERATE 35: CPT | Performed by: INTERNAL MEDICINE

## 2022-12-24 PROCEDURE — 25010000002 PIPERACILLIN SOD-TAZOBACTAM PER 1 G: Performed by: INTERNAL MEDICINE

## 2022-12-24 PROCEDURE — 83735 ASSAY OF MAGNESIUM: CPT | Performed by: INTERNAL MEDICINE

## 2022-12-24 PROCEDURE — 94761 N-INVAS EAR/PLS OXIMETRY MLT: CPT

## 2022-12-24 PROCEDURE — 82962 GLUCOSE BLOOD TEST: CPT

## 2022-12-24 PROCEDURE — 94640 AIRWAY INHALATION TREATMENT: CPT

## 2022-12-24 PROCEDURE — 63710000001 INSULIN REGULAR HUMAN PER 5 UNITS: Performed by: INTERNAL MEDICINE

## 2022-12-24 PROCEDURE — 25010000002 HYDRALAZINE PER 20 MG: Performed by: INTERNAL MEDICINE

## 2022-12-24 PROCEDURE — 80069 RENAL FUNCTION PANEL: CPT | Performed by: STUDENT IN AN ORGANIZED HEALTH CARE EDUCATION/TRAINING PROGRAM

## 2022-12-24 RX ORDER — ACETAMINOPHEN 325 MG/1
650 TABLET ORAL EVERY 4 HOURS PRN
Status: DISCONTINUED | OUTPATIENT
Start: 2022-12-24 | End: 2022-12-28 | Stop reason: HOSPADM

## 2022-12-24 RX ORDER — MAGNESIUM SULFATE HEPTAHYDRATE 40 MG/ML
2 INJECTION, SOLUTION INTRAVENOUS AS NEEDED
Status: DISCONTINUED | OUTPATIENT
Start: 2022-12-24 | End: 2022-12-28 | Stop reason: HOSPADM

## 2022-12-24 RX ORDER — TAMSULOSIN HYDROCHLORIDE 0.4 MG/1
0.4 CAPSULE ORAL DAILY
Status: DISCONTINUED | OUTPATIENT
Start: 2022-12-24 | End: 2022-12-26 | Stop reason: CLARIF

## 2022-12-24 RX ORDER — ALBUTEROL SULFATE 2.5 MG/3ML
2.5 SOLUTION RESPIRATORY (INHALATION) EVERY 4 HOURS PRN
Status: DISCONTINUED | OUTPATIENT
Start: 2022-12-24 | End: 2022-12-28 | Stop reason: HOSPADM

## 2022-12-24 RX ORDER — INSULIN LISPRO 100 [IU]/ML
0-14 INJECTION, SOLUTION INTRAVENOUS; SUBCUTANEOUS
Status: DISCONTINUED | OUTPATIENT
Start: 2022-12-24 | End: 2022-12-24

## 2022-12-24 RX ORDER — MAGNESIUM SULFATE HEPTAHYDRATE 40 MG/ML
4 INJECTION, SOLUTION INTRAVENOUS AS NEEDED
Status: DISCONTINUED | OUTPATIENT
Start: 2022-12-24 | End: 2022-12-28 | Stop reason: HOSPADM

## 2022-12-24 RX ADMIN — SODIUM CHLORIDE 100 ML/HR: 9 INJECTION, SOLUTION INTRAVENOUS at 00:07

## 2022-12-24 RX ADMIN — ALBUTEROL SULFATE 2.5 MG: 2.5 SOLUTION RESPIRATORY (INHALATION) at 05:03

## 2022-12-24 RX ADMIN — Medication 10 ML: at 09:30

## 2022-12-24 RX ADMIN — HYDRALAZINE HYDROCHLORIDE 10 MG: 20 INJECTION INTRAMUSCULAR; INTRAVENOUS at 23:32

## 2022-12-24 RX ADMIN — LEVOTHYROXINE SODIUM 100 MCG: 0.1 TABLET ORAL at 08:05

## 2022-12-24 RX ADMIN — METOPROLOL TARTRATE 12.5 MG: 25 TABLET, FILM COATED ORAL at 21:33

## 2022-12-24 RX ADMIN — APIXABAN 5 MG: 5 TABLET, FILM COATED ORAL at 21:33

## 2022-12-24 RX ADMIN — INSULIN HUMAN 3 UNITS: 100 INJECTION, SOLUTION PARENTERAL at 18:30

## 2022-12-24 RX ADMIN — METOPROLOL TARTRATE 12.5 MG: 25 TABLET, FILM COATED ORAL at 08:05

## 2022-12-24 RX ADMIN — Medication 2 PACKET: at 05:09

## 2022-12-24 RX ADMIN — GABAPENTIN 400 MG: 400 CAPSULE ORAL at 21:33

## 2022-12-24 RX ADMIN — INSULIN HUMAN 3 UNITS: 100 INJECTION, SOLUTION PARENTERAL at 05:22

## 2022-12-24 RX ADMIN — TAZOBACTAM SODIUM AND PIPERACILLIN SODIUM 3.38 G: 375; 3 INJECTION, SOLUTION INTRAVENOUS at 23:33

## 2022-12-24 RX ADMIN — TAZOBACTAM SODIUM AND PIPERACILLIN SODIUM 3.38 G: 375; 3 INJECTION, SOLUTION INTRAVENOUS at 08:05

## 2022-12-24 RX ADMIN — TAZOBACTAM SODIUM AND PIPERACILLIN SODIUM 3.38 G: 375; 3 INJECTION, SOLUTION INTRAVENOUS at 00:02

## 2022-12-24 RX ADMIN — Medication 10 ML: at 21:34

## 2022-12-24 RX ADMIN — MAGNESIUM SULFATE HEPTAHYDRATE 4 G: 40 INJECTION, SOLUTION INTRAVENOUS at 05:09

## 2022-12-24 RX ADMIN — HYDRALAZINE HYDROCHLORIDE 10 MG: 20 INJECTION INTRAMUSCULAR; INTRAVENOUS at 03:20

## 2022-12-24 RX ADMIN — INSULIN HUMAN 3 UNITS: 100 INJECTION, SOLUTION PARENTERAL at 12:05

## 2022-12-24 RX ADMIN — POTASSIUM CHLORIDE 40 MEQ: 1.5 POWDER, FOR SOLUTION ORAL at 05:09

## 2022-12-24 RX ADMIN — HEPARIN SODIUM 5000 UNITS: 5000 INJECTION INTRAVENOUS; SUBCUTANEOUS at 05:10

## 2022-12-24 RX ADMIN — GABAPENTIN 400 MG: 400 CAPSULE ORAL at 08:05

## 2022-12-24 RX ADMIN — ACETAMINOPHEN 650 MG: 325 TABLET, FILM COATED ORAL at 23:32

## 2022-12-24 RX ADMIN — TAZOBACTAM SODIUM AND PIPERACILLIN SODIUM 3.38 G: 375; 3 INJECTION, SOLUTION INTRAVENOUS at 16:40

## 2022-12-24 RX ADMIN — ASPIRIN 81 MG: 81 TABLET, COATED ORAL at 08:05

## 2022-12-24 RX ADMIN — HYDRALAZINE HYDROCHLORIDE 10 MG: 20 INJECTION INTRAMUSCULAR; INTRAVENOUS at 19:08

## 2022-12-24 NOTE — PLAN OF CARE
Goal Outcome Evaluation:  Plan of Care Reviewed With: patient           Outcome Evaluation: Clinical swallow eval completed. Pt c/o pain when swallowing and needed encouragement to take trials. Pt tolerated ice chips and tsp sips of water. Throal clearing and cough noted after cup/straw sips of thin. Pt had delayed coughing after trials of nectar thick, pureed, and soft. Pt stated it felt like the food wouldn't go down. Recommend NPO w/ nutrition and meds via [employer]. Pt can have ice chips or sips of water after oral care. ST to follow.

## 2022-12-24 NOTE — PROGRESS NOTES
[facility]  [phone]  Dr. Howie Christina    Subjective:  LOS: 4    Chief Complaint: Resuscitated cardiac arrest    Awake and alert. Still not oriented fully but able to follow commands etc and reorients.    Objective   Vital Signs past 24hrs    Temp range: Temp (24hrs), Av °F (36.7 °C), Min:97.6 °F (36.4 °C), Max:98.3 °F (36.8 °C)    BP range: BP: (119-171)/(56-87) 146/80  Pulse range: Heart Rate:  [54-90] 75  Resp rate range: Resp:  [14-20] 20    Device (Oxygen Therapy): room air   Oxygen range:SpO2:  [90 %-99 %] 92 %      112 kg (246 lb 7.6 oz); Body mass index is 35.37 kg/m².    Intake/Output Summary (Last 24 hours) at 2022 1055  Last data filed at 2022 0534  Gross per 24 hour   Intake 3971 ml   Output 2925 ml   Net 1046 ml       Physical Exam  Constitutional:       Interventions: He is restrained.   Eyes:      Pupils: Pupils are equal, round, and reactive to light.   Cardiovascular:      Rate and Rhythm: Normal rate and regular rhythm.      Heart sounds: No murmur heard.  Pulmonary:      Effort: Pulmonary effort is normal.      Breath sounds: Normal breath sounds.   Abdominal:      General: Bowel sounds are normal.      Palpations: Abdomen is soft. There is no mass.      Tenderness: There is no abdominal tenderness.   Musculoskeletal:         General: Swelling (mild) present.       Results Review:    I have reviewed the laboratory and imaging data since the last note by [facility] physician.  My annotations are noted in assessment and plan.    Medication Review:  I have reviewed the current MAR.  My annotations are noted in assessment and plan.    sodium chloride, 100 mL/hr, Last Rate: 100 mL/hr (22 0007)      Plan   Lines, Drains & Airways     Active LDAs     Name Placement date Placement time Site Days    Peripheral IV 22 1532 Anterior;Left Forearm 22  1532  Forearm  less than 1    Peripheral IV 22 1656 Anterior;Proximal;Right;Upper Arm 22  Arm   less than 1    Peripheral IV 12/20/22 1652 Anterior;Left;Upper Arm 12/20/22  1652  Arm  less than 1    Urethral Catheter Non-latex 16 Fr. 12/20/22  1901  -- less than 1    ETT  12/20/22  1521  -- less than 1    Arterial Line 12/20/22 Right Femoral 12/20/22  --  Femoral  1              PCCM Problems  Resuscitated cardiac arrest with PEA and bradycardia  Acute respiratory failure, mechanical ventilation  Severe acute metabolic lactic acidosis  Right femoral artery revascularization procedure, 12/20/2022  Bilateral infiltrates, ? 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  No other intervention for now per cardiology.  Blood pressure now better controlled.    Hyperdynamic LV on echocardiography.  Some leg edema now. Stop ivf.    Bilateral infiltrates and patient is on Zosyn and vancomycin to cover for likely aspiration.    Some confusion due to acute metabolic encephalopathy.  Expected to improve.    CKD 3B and currently creatinine looks like its baseline.  Replace electrolytes.    History recurrent DVTs. OK to resume full AC per vascular.    Trial modified diet. Await speech. Continue TF.    SS for DM2. Resume home Lantus when taking po more regularly.    Transfer floors.    Howie Christina MD  12/24/22  10:55 EST      Part of this note may be an electronic transcription/translation of spoken language to printed text using the Dragon Dictation System.

## 2022-12-24 NOTE — THERAPY EVALUATION
Acute Care - Speech Language Pathology   Swallow Initial Evaluation  [place]     Patient Name: Evangelist Garcia  : 1945  MRN: [id number]  Today's Date: 2022               Admit Date: 2022    Visit Dx:     ICD-10-CM ICD-9-CM   1. Cardiac arrest (HCC)  I46.9 427.5     Patient Active Problem List   Diagnosis   • Cardiac arrest (HCC)     History reviewed. No pertinent past medical history.  Past Surgical History:   Procedure Laterality Date   • ANGIOPLASTY FEMORAL ARTERY Right 2022    Procedure: ANGIOGRAM   FEMORAL ARTERY WITH OPEN  RIGHT ARTERIAL REPAIR RIGHT FEMORAL CUTDOWN;  Surgeon: Noemi Blount MD;  Location: [facility] OR ;  Service: Vascular;  Laterality: Right;       SLP Recommendation and Plan  SLP Swallowing Diagnosis: oral dysphagia, suspected pharyngeal dysphagia (22)  SLP Diet Recommendation: NPO, ice chips between meals after oral care, with supervision, water between meals after oral care, with supervision, temporary alternate methods of nutrition/hydration (22)  Recommended Precautions and Strategies: upright posture during/after eating, small bites of food and sips of liquid (22)  SLP Rec. for Method of Medication Administration: meds via alternate route (22)     Monitor for Signs of Aspiration: yes (22)  Recommended Diagnostics: reassess via clinical swallow evaluation (22)  Swallow Criteria for Skilled Therapeutic Interventions Met: demonstrates skilled criteria (22)  Anticipated Discharge Disposition (SLP): unknown (22)  Rehab Potential/Prognosis, Swallowing: good, to achieve stated therapy goals (22)  Therapy Frequency (Swallow): PRN (22)  Predicted Duration Therapy Intervention (Days): until discharge (22)                                        Plan of Care Reviewed With: patient  Outcome Evaluation: Clinical swallow eval completed. Pt  c/o pain when swallowing and needed encouragement to take trials. Pt tolerated ice chips and tsp sips of water. Throal clearing and cough noted after cup/straw sips of thin. Pt had delayed coughing after trials of nectar thick, pureed, and soft. Pt stated it felt like the food wouldn't go down. Recommend NPO w/ nutrition and meds via Cortrak. Pt can have ice chips or sips of water after oral care. ST to follow.      SWALLOW EVALUATION (last 72 hours)     SLP Adult Swallow Evaluation     Row Name 12/24/22 0900                   Rehab Evaluation    Document Type evaluation  -AW        Subjective Information no complaints  -AW        Patient Observations alert;cooperative;agree to therapy  -AW        Patient/Family/Caregiver Comments/Observations Pt became groggy during eval.  -AW        Patient Effort good  -AW        Symptoms Noted During/After Treatment fatigue  -AW           General Information    Patient Profile Reviewed yes  -AW        Pertinent History Of Current Problem Pt admitted w/ resuscitated cardiac arrest and respiratory failure; intubated 12/20 - 12/23.  -AW        Current Method of Nutrition NPO;nasogastric feedings  -AW        Precautions/Limitations, Vision WFL;for purposes of eval  -AW        Precautions/Limitations, Hearing WFL;for purposes of eval  -AW        Prior Level of Function-Communication unknown  -AW        Prior Level of Function-Swallowing no diet consistency restrictions  -AW        Plans/Goals Discussed with patient;agreed upon  -AW        Barriers to Rehab none identified  -AW        Patient's Goals for Discharge return to PO diet  -AW           Pain    Additional Documentation Pain Scale: Numbers Pre/Post-Treatment (Group)  -AW           Pain Scale: Numbers Pre/Post-Treatment    Pretreatment Pain Rating 0/10 - no pain  -AW        Posttreatment Pain Rating 0/10 - no pain  -AW           Oral Motor Structure and Function    Dentition Assessment natural, present and adequate  -AW         Secretion Management WNL/WFL  -AW        Mucosal Quality moist, healthy  -AW           Oral Musculature and Cranial Nerve Assessment    Oral Motor General Assessment WFL  -AW           General Eating/Swallowing Observations    Respiratory Support Currently in Use room air  -AW        Eating/Swallowing Skills fed by SLP  -AW        Positioning During Eating upright in bed  -AW        Utensils Used cup;spoon  -AW        Consistencies Trialed ice chips;thin liquids;nectar/syrup-thick liquids;pureed;soft to chew textures  -AW        Pre SpO2 (%) 93  -AW        Post SpO2 (%) 94  -AW           Clinical Swallow Eval    Clinical Swallow Evaluation Summary Clinical swallow eval completed. Pt c/o pain when swallowing and needed encouragement to take trials. Pt tolerated ice chips and tsp sips of water. Throal clearing and cough noted after cup/straw sips of thin. Pt had delayed coughing after trials of nectar thick, pureed, and soft. Pt stated it felt like the food wouldn't go down. Recommend NPO w/ nutrition and meds via Cortrak. Pt can have ice chips or sips of water after oral care. ST to follow.  -AW           SLP Evaluation Clinical Impression    SLP Swallowing Diagnosis oral dysphagia;suspected pharyngeal dysphagia  -AW        Functional Impact risk of aspiration/pneumonia  -AW        Rehab Potential/Prognosis, Swallowing good, to achieve stated therapy goals  -AW        Swallow Criteria for Skilled Therapeutic Interventions Met demonstrates skilled criteria  -AW           Recommendations    Therapy Frequency (Swallow) PRN  -AW        Predicted Duration Therapy Intervention (Days) until discharge  -AW        SLP Diet Recommendation NPO;ice chips between meals after oral care, with supervision;water between meals after oral care, with supervision;temporary alternate methods of nutrition/hydration  -AW        Recommended Diagnostics reassess via clinical swallow evaluation  -AW        Recommended Precautions and  Strategies upright posture during/after eating;small bites of food and sips of liquid  -AW        Oral Care Recommendations Oral Care BID/PRN  -AW        SLP Rec. for Method of Medication Administration meds via alternate route  -AW        Monitor for Signs of Aspiration yes  -AW        Anticipated Discharge Disposition (SLP) unknown  -AW              User Key  (r) = Recorded By, (t) = Taken By, (c) = Cosigned By    Initials Name Effective Dates    Tonya Jefferson MS CCC-SLP 06/16/21 -                 EDUCATION  The patient has been educated in the following areas:   Dysphagia (Swallowing Impairment) Oral Care/Hydration NPO rationale.              Time Calculation:    Time Calculation- SLP     Row Name 12/24/22 0947             Time Calculation- SLP    SLP Start Time 0830  -AW      SLP Received On 12/24/22  -AW            User Key  (r) = Recorded By, (t) = Taken By, (c) = Cosigned By    Initials Name Provider Type    Tonya Jefferson MS CCC-SLP Speech and Language Pathologist                Therapy Charges for Today     Code Description Service Date Service Provider Modifiers Qty    [id number] HC ST EVAL ORAL PHARYNG SWALLOW 3 12/24/2022 Tonya Fuller MS CCC-SLP GN 1               Tonya Fuller MS CCC-SLP  12/24/2022

## 2022-12-24 NOTE — PROGRESS NOTES
Evangelist Garcia  1945 77 y.o.  [id number]      Patient Care Team:  Misa Garcia MD as PCP - General (Internal Medicine)    CC: PEA cardiac arrest, right bundle branch block left anterior fascicular block, diabetes renal insufficiency    Interval History: Awake not in distress      Objective   Vital Signs  Temp:  [98 °F (36.7 °C)-98.3 °F (36.8 °C)] 98 °F (36.7 °C)  Heart Rate:  [54-90] 75  Resp:  [14-20] 20  BP: (129-171)/(56-87) 146/80    Intake/Output Summary (Last 24 hours) at 12/24/2022 1110  Last data filed at 12/24/2022 0534  Gross per 24 hour   Intake 3971 ml   Output 2925 ml   Net 1046 ml     Flowsheet Rows    Flowsheet Row First Filed Value   Admission Height 177.8 cm (70\") Documented at 12/20/2022 1529   Admission Weight 118 kg (259 lb 14.4 oz) Documented at 12/20/2022 1526          Physical Exam:   General Appearance:    in no acute distress   Lungs:     Clear to auscultation,BS are equal    Heart:    Normal S1 and S2, RRR without murmur, gallop or rub   HEENT:    Sclerae are clear, no JVD or adenopathy   Abdomen:     Normal bowel sounds, soft nontender, nondistended, no HSM   Extremities:   Moves all extremities well, no edema, no cyanosis, no             Redness, no rash     Medication Review:      apixaban, 5 mg, Oral, Q12H  aspirin, 81 mg, Oral, Daily  gabapentin, 400 mg, Oral, Q12H  insulin lispro, 0-14 Units, Subcutaneous, TID AC  insulin regular, 0-14 Units, Subcutaneous, TID With Meals  levothyroxine, 100 mcg, Oral, Daily  metoprolol tartrate, 12.5 mg, Oral, Q12H  piperacillin-tazobactam, 3.375 g, Intravenous, Once  piperacillin-tazobactam, 3.375 g, Intravenous, Q8H  sodium chloride, 10 mL, Intravenous, Q12H  tamsulosin, 0.4 mg, Oral, Daily             I reviewed the patient's new clinical results.  I personally viewed and interpreted the patient's EKG/Telemetry data    Assessment/Plan  Active Hospital Problems    Diagnosis  POA   • **Cardiac arrest (HCC) [I46.9]  Yes      Resolved  Hospital Problems   No resolved problems to display.       1. Cardiac arrest.  Recurrent PEA arrest.   Troponin and BNP are normal.  EKG with chronic bifascicular block but otherwise no ischemic changes.  No reports of ventricular arrhythmias.   Echocardiogram unremarkable.  2. Bradycardia.  Reportedly preceded by bradycardia and possible pauses but no strips to review available.  No definite heart block noted or reported.    No further recurrent episodes of bradycardia despite resuming metoprolol.  Wonder if bradycardia was due to severe vagal response.     - Continue current dose of metoprolol and monitor for any further evidence of bradycardia or pauses.  -  Might consider placement of a monitor at discharge or as an outpatient to ensure he has no further issues with bradycardia or pauses.  - Otherwise no plans for further cardiac work-up or change in management at this time.    We will see him as needed  Jarad Marshall MD  12/24/22  11:10 EST

## 2022-12-24 NOTE — PLAN OF CARE
Goal Outcome Evaluation:      VSS. Growing more and more appropriate (I.e. following commands, answering yes or no questions appropriately).

## 2022-12-25 LAB
ALBUMIN SERPL-MCNC: 3.1 G/DL (ref 3.5–5.2)
ANION GAP SERPL CALCULATED.3IONS-SCNC: 7.8 MMOL/L (ref 5–15)
BACTERIA SPEC AEROBE CULT: NORMAL
BUN SERPL-MCNC: 25 MG/DL (ref 8–23)
BUN/CREAT SERPL: 17.2 (ref 7–25)
CALCIUM SPEC-SCNC: 9.5 MG/DL (ref 8.6–10.5)
CHLORIDE SERPL-SCNC: 106 MMOL/L (ref 98–107)
CO2 SERPL-SCNC: 24.2 MMOL/L (ref 22–29)
CREAT SERPL-MCNC: 1.45 MG/DL (ref 0.76–1.27)
DEPRECATED RDW RBC AUTO: 47.4 FL (ref 37–54)
EGFRCR SERPLBLD CKD-EPI 2021: 49 ML/MIN/1.73
ERYTHROCYTE [DISTWIDTH] IN BLOOD BY AUTOMATED COUNT: 13 % (ref 12.3–15.4)
GLUCOSE BLDC GLUCOMTR-MCNC: 146 MG/DL (ref 70–130)
GLUCOSE BLDC GLUCOMTR-MCNC: 156 MG/DL (ref 70–130)
GLUCOSE BLDC GLUCOMTR-MCNC: 159 MG/DL (ref 70–130)
GLUCOSE BLDC GLUCOMTR-MCNC: 167 MG/DL (ref 70–130)
GLUCOSE BLDC GLUCOMTR-MCNC: 171 MG/DL (ref 70–130)
GLUCOSE SERPL-MCNC: 169 MG/DL (ref 65–99)
HCT VFR BLD AUTO: 33.4 % (ref 37.5–51)
HGB BLD-MCNC: 11.2 G/DL (ref 13–17.7)
MAGNESIUM SERPL-MCNC: 2.1 MG/DL (ref 1.6–2.4)
MCH RBC QN AUTO: 33.9 PG (ref 26.6–33)
MCHC RBC AUTO-ENTMCNC: 33.5 G/DL (ref 31.5–35.7)
MCV RBC AUTO: 101.2 FL (ref 79–97)
PHOSPHATE SERPL-MCNC: 2.6 MG/DL (ref 2.5–4.5)
PLATELET # BLD AUTO: 196 10*3/MM3 (ref 140–450)
PMV BLD AUTO: 9.3 FL (ref 6–12)
POTASSIUM SERPL-SCNC: 3.7 MMOL/L (ref 3.5–5.2)
RBC # BLD AUTO: 3.3 10*6/MM3 (ref 4.14–5.8)
SODIUM SERPL-SCNC: 138 MMOL/L (ref 136–145)
WBC NRBC COR # BLD: 6.21 10*3/MM3 (ref 3.4–10.8)

## 2022-12-25 PROCEDURE — 80069 RENAL FUNCTION PANEL: CPT | Performed by: INTERNAL MEDICINE

## 2022-12-25 PROCEDURE — 83735 ASSAY OF MAGNESIUM: CPT | Performed by: INTERNAL MEDICINE

## 2022-12-25 PROCEDURE — 85027 COMPLETE CBC AUTOMATED: CPT | Performed by: INTERNAL MEDICINE

## 2022-12-25 PROCEDURE — 82962 GLUCOSE BLOOD TEST: CPT

## 2022-12-25 PROCEDURE — 63710000001 INSULIN REGULAR HUMAN PER 5 UNITS: Performed by: INTERNAL MEDICINE

## 2022-12-25 PROCEDURE — 25010000002 PIPERACILLIN SOD-TAZOBACTAM PER 1 G: Performed by: INTERNAL MEDICINE

## 2022-12-25 RX ORDER — ASPIRIN 81 MG/1
81 TABLET, CHEWABLE ORAL DAILY
Status: DISCONTINUED | OUTPATIENT
Start: 2022-12-25 | End: 2022-12-28 | Stop reason: HOSPADM

## 2022-12-25 RX ADMIN — TAZOBACTAM SODIUM AND PIPERACILLIN SODIUM 3.38 G: 375; 3 INJECTION, SOLUTION INTRAVENOUS at 18:18

## 2022-12-25 RX ADMIN — Medication 10 ML: at 08:21

## 2022-12-25 RX ADMIN — TAZOBACTAM SODIUM AND PIPERACILLIN SODIUM 3.38 G: 375; 3 INJECTION, SOLUTION INTRAVENOUS at 08:21

## 2022-12-25 RX ADMIN — INSULIN HUMAN 3 UNITS: 100 INJECTION, SOLUTION PARENTERAL at 06:52

## 2022-12-25 RX ADMIN — LEVOTHYROXINE SODIUM 100 MCG: 0.1 TABLET ORAL at 08:20

## 2022-12-25 RX ADMIN — METOPROLOL TARTRATE 12.5 MG: 25 TABLET, FILM COATED ORAL at 21:33

## 2022-12-25 RX ADMIN — ASPIRIN 81 MG: 81 TABLET, CHEWABLE ORAL at 08:25

## 2022-12-25 RX ADMIN — INSULIN HUMAN 3 UNITS: 100 INJECTION, SOLUTION PARENTERAL at 18:19

## 2022-12-25 RX ADMIN — INSULIN HUMAN 3 UNITS: 100 INJECTION, SOLUTION PARENTERAL at 01:29

## 2022-12-25 RX ADMIN — APIXABAN 5 MG: 5 TABLET, FILM COATED ORAL at 08:20

## 2022-12-25 RX ADMIN — GABAPENTIN 400 MG: 400 CAPSULE ORAL at 21:33

## 2022-12-25 RX ADMIN — METOPROLOL TARTRATE 12.5 MG: 25 TABLET, FILM COATED ORAL at 08:20

## 2022-12-25 RX ADMIN — GABAPENTIN 400 MG: 400 CAPSULE ORAL at 08:21

## 2022-12-25 RX ADMIN — Medication 10 ML: at 21:34

## 2022-12-25 RX ADMIN — APIXABAN 5 MG: 5 TABLET, FILM COATED ORAL at 21:33

## 2022-12-25 NOTE — PROGRESS NOTES
Name: Evangelist Garcia ADMIT: 2022   : 1945  PCP: Misa Garcia MD    MRN: [id number] LOS: 5 days   AGE/SEX: 77 y.o. male  ROOM:  [place] [id number]       Vascular Surgery Note    Patient's right groin incision healing well staples in place with no erythema induration swelling or drainage  He has palpable pedal pulses in both feet  No signs of complication from sheath removal so far.  Continue supportive care per primary team, other providers.      Miguel Khan II, MD  22  11:09 EST  Office Number [phone]

## 2022-12-25 NOTE — PROGRESS NOTES
[facility]  [phone]  Dr. Howie Christina    Subjective:  LOS: 5    Chief Complaint: Resuscitated cardiac arrest    Awake and alert. Answers all questions.  Still with cortrack.    Objective   Vital Signs past 24hrs    Temp range: Temp (24hrs), Av.9 °F (36.6 °C), Min:97.3 °F (36.3 °C), Max:98.8 °F (37.1 °C)    BP range: BP: (123-174)/() 150/85  Pulse range: Heart Rate:  [66-87] 79  Resp rate range: Resp:  [14-24] 16    Device (Oxygen Therapy): room air   Oxygen range:SpO2:  [90 %-94 %] 92 %      111 kg (244 lb 4.3 oz); Body mass index is 35.05 kg/m².    Intake/Output Summary (Last 24 hours) at 2022 1727  Last data filed at 2022 1421  Gross per 24 hour   Intake 2361 ml   Output 3825 ml   Net -1464 ml       Physical Exam  Constitutional:       Interventions: He is restrained.   Eyes:      Pupils: Pupils are equal, round, and reactive to light.   Cardiovascular:      Rate and Rhythm: Normal rate and regular rhythm.      Heart sounds: No murmur heard.  Pulmonary:      Effort: Pulmonary effort is normal.      Breath sounds: Normal breath sounds.   Abdominal:      General: Bowel sounds are normal.      Palpations: Abdomen is soft. There is no mass.      Tenderness: There is no abdominal tenderness.   Musculoskeletal:         General: No swelling.       Results Review:    I have reviewed the laboratory and imaging data since the last note by [facility] physician.  My annotations are noted in assessment and plan.    Medication Review:  I have reviewed the current MAR.  My annotations are noted in assessment and plan.       Plan   Lines, Drains & Airways     Active LDAs     Name Placement date Placement time Site Days    Peripheral IV 22 1532 Anterior;Left Forearm 22  1532  Forearm  less than 1    Peripheral IV 22 1656 Anterior;Proximal;Right;Upper Arm 22  1656  Arm  less than 1    Peripheral IV 22 1652 Anterior;Left;Upper Arm 22  1652  Arm  less than 1     Urethral Catheter Non-latex 16 Fr. 12/20/22  1901  -- less than 1    ETT  12/20/22  1521  -- less than 1    Arterial Line 12/20/22 Right Femoral 12/20/22  --  Femoral  1              PCCM Problems  Resuscitated cardiac arrest with PEA and bradycardia  Acute respiratory failure, mechanical ventilation  Severe acute metabolic lactic acidosis  Right femoral artery revascularization procedure, 12/20/2022  Bilateral infiltrates, ? 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  No other intervention for now per cardiology.  Zio monitor on discharge.    Hyperdynamic LV on echocardiography.  Received ivf.    Bilateral infiltrates and patient is on Zosyn and vancomycin to cover for likely aspiration. CxR tomorrow. Possibly switch to po abx.    Some confusion due to acute metabolic encephalopathy.  Now improved.    CKD 3B and currently creatinine looks like its baseline.    History recurrent DVTs. OK to resume full AC per vascular.    Trial modified diet. Await speech. Continue TF.    SS for DM2. Resume home Lantus when taking po more regularly.    Home tomorrow if OK with all. PT has not seen yet.    Howie Christina MD  12/25/22  17:27 EST      Part of this note may be an electronic transcription/translation of spoken language to printed text using the Dragon Dictation System.

## 2022-12-26 ENCOUNTER — APPOINTMENT (OUTPATIENT)
Dept: GENERAL RADIOLOGY | Facility: HOSPITAL | Age: 77
DRG: 252 | End: 2022-12-26
Payer: MEDICARE

## 2022-12-26 LAB
ALBUMIN SERPL-MCNC: 3.5 G/DL (ref 3.5–5.2)
ANION GAP SERPL CALCULATED.3IONS-SCNC: 11.4 MMOL/L (ref 5–15)
BUN SERPL-MCNC: 28 MG/DL (ref 8–23)
BUN/CREAT SERPL: 20.7 (ref 7–25)
CALCIUM SPEC-SCNC: 9.6 MG/DL (ref 8.6–10.5)
CHLORIDE SERPL-SCNC: 103 MMOL/L (ref 98–107)
CO2 SERPL-SCNC: 23.6 MMOL/L (ref 22–29)
CREAT SERPL-MCNC: 1.35 MG/DL (ref 0.76–1.27)
DEPRECATED RDW RBC AUTO: 45 FL (ref 37–54)
EGFRCR SERPLBLD CKD-EPI 2021: 53.4 ML/MIN/1.73
ERYTHROCYTE [DISTWIDTH] IN BLOOD BY AUTOMATED COUNT: 12.7 % (ref 12.3–15.4)
GLUCOSE BLDC GLUCOMTR-MCNC: 169 MG/DL (ref 70–130)
GLUCOSE BLDC GLUCOMTR-MCNC: 176 MG/DL (ref 70–130)
GLUCOSE BLDC GLUCOMTR-MCNC: 179 MG/DL (ref 70–130)
GLUCOSE BLDC GLUCOMTR-MCNC: 179 MG/DL (ref 70–130)
GLUCOSE BLDC GLUCOMTR-MCNC: 193 MG/DL (ref 70–130)
GLUCOSE SERPL-MCNC: 183 MG/DL (ref 65–99)
HCT VFR BLD AUTO: 35.7 % (ref 37.5–51)
HGB BLD-MCNC: 12.3 G/DL (ref 13–17.7)
MCH RBC QN AUTO: 33.8 PG (ref 26.6–33)
MCHC RBC AUTO-ENTMCNC: 34.5 G/DL (ref 31.5–35.7)
MCV RBC AUTO: 98.1 FL (ref 79–97)
PHOSPHATE SERPL-MCNC: 2.8 MG/DL (ref 2.5–4.5)
PLATELET # BLD AUTO: 211 10*3/MM3 (ref 140–450)
PMV BLD AUTO: 9.1 FL (ref 6–12)
POTASSIUM SERPL-SCNC: 3.7 MMOL/L (ref 3.5–5.2)
RBC # BLD AUTO: 3.64 10*6/MM3 (ref 4.14–5.8)
SODIUM SERPL-SCNC: 138 MMOL/L (ref 136–145)
WBC NRBC COR # BLD: 6.81 10*3/MM3 (ref 3.4–10.8)

## 2022-12-26 PROCEDURE — 97116 GAIT TRAINING THERAPY: CPT

## 2022-12-26 PROCEDURE — 85027 COMPLETE CBC AUTOMATED: CPT | Performed by: INTERNAL MEDICINE

## 2022-12-26 PROCEDURE — 94799 UNLISTED PULMONARY SVC/PX: CPT

## 2022-12-26 PROCEDURE — 63710000001 INSULIN REGULAR HUMAN PER 5 UNITS: Performed by: INTERNAL MEDICINE

## 2022-12-26 PROCEDURE — 97162 PT EVAL MOD COMPLEX 30 MIN: CPT

## 2022-12-26 PROCEDURE — 25010000002 HYDRALAZINE PER 20 MG: Performed by: INTERNAL MEDICINE

## 2022-12-26 PROCEDURE — 97165 OT EVAL LOW COMPLEX 30 MIN: CPT

## 2022-12-26 PROCEDURE — 71046 X-RAY EXAM CHEST 2 VIEWS: CPT

## 2022-12-26 PROCEDURE — 80069 RENAL FUNCTION PANEL: CPT | Performed by: INTERNAL MEDICINE

## 2022-12-26 PROCEDURE — 97535 SELF CARE MNGMENT TRAINING: CPT

## 2022-12-26 PROCEDURE — 25010000002 PIPERACILLIN SOD-TAZOBACTAM PER 1 G: Performed by: INTERNAL MEDICINE

## 2022-12-26 PROCEDURE — 82962 GLUCOSE BLOOD TEST: CPT

## 2022-12-26 PROCEDURE — 92526 ORAL FUNCTION THERAPY: CPT

## 2022-12-26 RX ORDER — GABAPENTIN 250 MG/5ML
400 SOLUTION ORAL EVERY 12 HOURS SCHEDULED
Status: DISCONTINUED | OUTPATIENT
Start: 2022-12-26 | End: 2022-12-27

## 2022-12-26 RX ORDER — TERAZOSIN 1 MG/1
1 CAPSULE ORAL NIGHTLY
Status: DISCONTINUED | OUTPATIENT
Start: 2022-12-26 | End: 2022-12-27 | Stop reason: CLARIF

## 2022-12-26 RX ADMIN — GABAPENTIN 400 MG: 400 CAPSULE ORAL at 08:53

## 2022-12-26 RX ADMIN — TAZOBACTAM SODIUM AND PIPERACILLIN SODIUM 3.38 G: 375; 3 INJECTION, SOLUTION INTRAVENOUS at 16:45

## 2022-12-26 RX ADMIN — TAZOBACTAM SODIUM AND PIPERACILLIN SODIUM 3.38 G: 375; 3 INJECTION, SOLUTION INTRAVENOUS at 08:54

## 2022-12-26 RX ADMIN — ACETAMINOPHEN 650 MG: 325 TABLET, FILM COATED ORAL at 20:17

## 2022-12-26 RX ADMIN — INSULIN HUMAN 3 UNITS: 100 INJECTION, SOLUTION PARENTERAL at 07:45

## 2022-12-26 RX ADMIN — INSULIN HUMAN 3 UNITS: 100 INJECTION, SOLUTION PARENTERAL at 12:17

## 2022-12-26 RX ADMIN — HYDRALAZINE HYDROCHLORIDE 10 MG: 20 INJECTION INTRAMUSCULAR; INTRAVENOUS at 00:09

## 2022-12-26 RX ADMIN — Medication 10 ML: at 09:22

## 2022-12-26 RX ADMIN — APIXABAN 5 MG: 5 TABLET, FILM COATED ORAL at 08:53

## 2022-12-26 RX ADMIN — GABAPENTIN 400 MG: 250 SOLUTION ORAL at 20:18

## 2022-12-26 RX ADMIN — ASPIRIN 81 MG: 81 TABLET, CHEWABLE ORAL at 08:53

## 2022-12-26 RX ADMIN — INSULIN HUMAN 3 UNITS: 100 INJECTION, SOLUTION PARENTERAL at 16:47

## 2022-12-26 RX ADMIN — LEVOTHYROXINE SODIUM 100 MCG: 0.1 TABLET ORAL at 06:12

## 2022-12-26 RX ADMIN — ALBUTEROL SULFATE 2.5 MG: 2.5 SOLUTION RESPIRATORY (INHALATION) at 04:51

## 2022-12-26 RX ADMIN — Medication 10 ML: at 20:18

## 2022-12-26 RX ADMIN — TERAZOSIN 1 MG: 1 CAPSULE ORAL at 20:17

## 2022-12-26 RX ADMIN — TAZOBACTAM SODIUM AND PIPERACILLIN SODIUM 3.38 G: 375; 3 INJECTION, SOLUTION INTRAVENOUS at 23:58

## 2022-12-26 RX ADMIN — METOPROLOL TARTRATE 12.5 MG: 25 TABLET, FILM COATED ORAL at 20:17

## 2022-12-26 RX ADMIN — TAZOBACTAM SODIUM AND PIPERACILLIN SODIUM 3.38 G: 375; 3 INJECTION, SOLUTION INTRAVENOUS at 01:58

## 2022-12-26 RX ADMIN — Medication 10 ML: at 08:55

## 2022-12-26 RX ADMIN — INSULIN HUMAN 3 UNITS: 100 INJECTION, SOLUTION PARENTERAL at 21:18

## 2022-12-26 RX ADMIN — APIXABAN 5 MG: 5 TABLET, FILM COATED ORAL at 20:17

## 2022-12-26 RX ADMIN — INSULIN GLARGINE-YFGN 15 UNITS: 100 INJECTION, SOLUTION SUBCUTANEOUS at 21:07

## 2022-12-26 RX ADMIN — METOPROLOL TARTRATE 12.5 MG: 25 TABLET, FILM COATED ORAL at 08:55

## 2022-12-26 RX ADMIN — ACETAMINOPHEN 650 MG: 325 TABLET, FILM COATED ORAL at 06:11

## 2022-12-26 NOTE — THERAPY EVALUATION
Patient Name: Evangelist Garcia  : 1945    MRN: [id number]                              Today's Date: 2022       Admit Date: 2022    Visit Dx:     ICD-10-CM ICD-9-CM   1. Cardiac arrest (HCC)  I46.9 427.5     Patient Active Problem List   Diagnosis   • Cardiac arrest (HCC)     History reviewed. No pertinent past medical history.  Past Surgical History:   Procedure Laterality Date   • ANGIOPLASTY FEMORAL ARTERY Right 2022    Procedure: ANGIOGRAM   FEMORAL ARTERY WITH OPEN  RIGHT ARTERIAL REPAIR RIGHT FEMORAL CUTDOWN;  Surgeon: Noemi Blount MD;  Location: [facility] ;  Service: Vascular;  Laterality: Right;      General Information     Row Name 22 1021          Physical Therapy Time and Intention    Document Type evaluation  -     Mode of Treatment individual therapy;physical therapy  -     Row Name 22 1021          General Information    Patient Profile Reviewed yes  -     Prior Level of Function independent:;gait;transfer;bed mobility  -     Existing Precautions/Restrictions fall;NPO  Cortrak  -     Barriers to Rehab medically complex  -     Row Name 22 1021          Living Environment    People in Home spouse  -     Row Name 22 1021          Home Main Entrance    Number of Stairs, Main Entrance ten  -     Row Name 22 1021          Stairs Within Home, Primary    Number of Stairs, Within Home, Primary none  Steps to basement, but bedroom/bathroom on main floor  -     Row Name 22 1021          Cognition    Orientation Status (Cognition) oriented x 4  -     Row Name 22 1021          Safety Issues, Functional Mobility    Impairments Affecting Function (Mobility) balance;endurance/activity tolerance;strength  -           User Key  (r) = Recorded By, (t) = Taken By, (c) = Cosigned By    Initials Name Provider Type     Kia Pickett PT Physical Therapist               Mobility     Row Name 22 1022          Bed  Mobility    Bed Mobility supine-sit;sit-supine  -     Supine-Sit [place] (Bed Mobility) minimum assist (75% patient effort);verbal cues  -     Sit-Supine [place] (Bed Mobility) contact guard;verbal cues  -     Assistive Device (Bed Mobility) bed rails;head of bed elevated;draw sheet  -     Comment, (Bed Mobility) C/o significant dizziness with sit<>supine  -     Row Name 12/26/22 1022          Sit-Stand Transfer    Sit-Stand [place] (Transfers) contact guard;verbal cues  -     Assistive Device (Sit-Stand Transfers) walker, front-wheeled  -[facility] Name 12/26/22 1022          Gait/Stairs (Locomotion)    [place] Level (Gait) verbal cues;contact guard  -     Assistive Device (Gait) walker, front-wheeled  -     Distance in Feet (Gait) 30ft + 15ft  -     Deviations/Abnormal Patterns (Gait) ba decreased;gait speed decreased;stride length decreased  -     Bilateral Gait Deviations forward flexed posture  -     Comment, (Gait/Stairs) Slow pace, unsteady initially  -           User Key  (r) = Recorded By, (t) = Taken By, (c) = Cosigned By    Initials Name Provider Type     Kia Pickett, PT Physical Therapist               Obj/Interventions     [facility] Name 12/26/22 1023          Range of Motion Comprehensive    General Range of Motion bilateral lower extremity ROM WFL  -[facility] Name 12/26/22 1023          Strength Comprehensive (MMT)    General Manual Muscle Testing (MMT) Assessment lower extremity strength deficits identified  -     Comment, General Manual Muscle Testing (MMT) Assessment Generalized weakness, BLE grossly 4/5  -[facility] Name 12/26/22 1023          Balance    Balance Assessment sitting static balance;sitting dynamic balance;standing static balance;standing dynamic balance  -     Static Sitting Balance standby assist  -     Dynamic Sitting Balance standby assist  -     Position, Sitting Balance unsupported;sitting edge of bed  -     Static  Standing Balance contact guard;verbal cues  -     Dynamic Standing Balance contact guard;verbal cues  -     Position/Device Used, Standing Balance supported;walker, front-wheeled  -     Balance Interventions sitting;standing;sit to stand;supported;static;dynamic  -[facility] Name 12/26/22 1023          Sensory Assessment (Somatosensory)    Sensory Assessment (Somatosensory) LE sensation intact  -           User Key  (r) = Recorded By, (t) = Taken By, (c) = Cosigned By    Initials Name Provider Type     Kia Pickett PT Physical Therapist               Goals/Plan     Row Name 12/26/22 1029          Bed Mobility Goal 1 (PT)    Activity/Assistive Device (Bed Mobility Goal 1, PT) bed mobility activities, all  -     [place] Level/Cues Needed (Bed Mobility Goal 1, PT) supervision required  -     Time Frame (Bed Mobility Goal 1, PT) 1 week  -[facility] Name 12/26/22 1029          Transfer Goal 1 (PT)    Activity/Assistive Device (Transfer Goal 1, PT) transfers, all  -     [place] Level/Cues Needed (Transfer Goal 1, PT) supervision required  -     Time Frame (Transfer Goal 1, PT) 1 week  -[facility] Name 12/26/22 1029          Gait Training Goal 1 (PT)    Activity/Assistive Device (Gait Training Goal 1, PT) gait (walking locomotion)  -     [place] Level (Gait Training Goal 1, PT) supervision required  -     Distance (Gait Training Goal 1, PT) 150ft  -     Time Frame (Gait Training Goal 1, PT) 1 week  -[facility] Name 12/26/22 1029          Therapy Assessment/Plan (PT)    Planned Therapy Interventions (PT) balance training;bed mobility training;gait training;home exercise program;patient/family education;strengthening;stair training;transfer training  -           User Key  (r) = Recorded By, (t) = Taken By, (c) = Cosigned By    Initials Name Provider Type     Kia Pickett PT Physical Therapist               Clinical Impression     Row Name 12/26/22 1024          Pain     Pretreatment Pain Rating 0/10 - no pain  -     Posttreatment Pain Rating 0/10 - no pain  -     Row Name 12/26/22 1024          Plan of Care Review    Plan of Care Reviewed With patient  -     Outcome Evaluation Pt is a 76 yo M admitted following cardiac arrest after an outpatient vascular procedure. Pt intubated from 12/20-12/22, remains NPO with cortrak. Pt lives with his wife and reports independence at BL with no AD. Pt has 10 MONY and a 1st floor bedroom/bathroom. Pt denies recent falls hx. Pt presents to PT with impaired strength, endurance, and balance limiting overall mobility. Pt transferred to EOB with min A x1 and stood with CGA. Pt ambulated 30ft +15ft with rwx and CGA. Pt mildly unsteady initially, prompting use of rwx. With progressed gait distance, pt more independent though fatigues quickly. Gait distance limited 2/2 fatigue and transport arriving to take pt COLBY. Pt transferred sit<>supine with CGA and assist repositioning with draw sheet. Pt c/o significant dizziness with transfer to supine, requiring increased assist. PT will continue to follow to progress mobility as tolerated. Anticipate DC home with HHPT pending progress.  -     Row Name 12/26/22 1024          Therapy Assessment/Plan (PT)    Patient/Family Therapy Goals Statement (PT) Return to [facility]  -     Rehab Potential (PT) good, to achieve stated therapy goals  [facility]     Criteria for Skilled Interventions Met (PT) yes  -     Therapy Frequency (PT) 5 times/wk  -     Row Name 12/26/22 1024          Vital Signs    O2 Delivery Pre Treatment room air  -     O2 Delivery Intra Treatment room air  -     O2 Delivery Post Treatment room air  -     Row Name 12/26/22 1024          Positioning and Restraints    Pre-Treatment Position in bed  -     Post Treatment Position other  -     Other Position with other staff  With transport on stretcher  -           User Key  (r) = Recorded By, (t) = Taken By, (c) = Cosigned By    Initials  Name Provider Type     Kia Pickett PT Physical Therapist               Outcome Measures     Row Name 12/26/22 1030          How much help from another person do you currently need...    Turning from your back to your side while in flat bed without using bedrails? 3  -BH     Moving from lying on back to sitting on the side of a flat bed without bedrails? 2  -BH     Moving to and from a bed to a chair (including a wheelchair)? 3  -BH     Standing up from a chair using your arms (e.g., wheelchair, bedside chair)? 3  -BH     Climbing 3-5 steps with a railing? 2  -BH     To walk in hospital room? 3  -BH     AM-PAC 6 Clicks Score (PT) 16  -BH     Highest level of mobility 5 --> Static standing  -     Row Name 12/26/22 1030          Functional Assessment    Outcome Measure Options AM-PAC 6 Clicks Basic Mobility (PT)  -           User Key  (r) = Recorded By, (t) = Taken By, (c) = Cosigned By    Initials Name Provider Type     Kia Pickett PT Physical Therapist                             Physical Therapy Education     Title: PT OT SLP Therapies (Done)     Topic: Physical Therapy (Done)     Point: Mobility training (Done)     Learning Progress Summary           Patient Acceptance, E,TB,D, VU,NR by  at 12/26/2022 1030                   Point: Home exercise program (Done)     Learning Progress Summary           Patient Acceptance, E,TB,D, VU,NR by  at 12/26/2022 1030                   Point: Body mechanics (Done)     Learning Progress Summary           Patient Acceptance, E,TB,D, VU,NR by  at 12/26/2022 1030                   Point: Precautions (Done)     Learning Progress Summary           Patient Acceptance, E,TB,D, VU,NR by  at 12/26/2022 1030                               User Key     Initials Effective Dates Name Provider Type [facility] 04/08/22 -  Kia Pickett PT Physical Therapist PT              PT Recommendation and Plan  Planned Therapy Interventions (PT): balance training, bed  mobility training, gait training, home exercise program, patient/family education, strengthening, stair training, transfer training  Plan of Care Reviewed With: patient  Outcome Evaluation: Pt is a 76 yo M admitted following cardiac arrest after an outpatient vascular procedure. Pt intubated from 12/20-12/22, remains NPO with cortrak. Pt lives with his wife and reports independence at BL with no AD. Pt has 10 MONY and a 1st floor bedroom/bathroom. Pt denies recent falls hx. Pt presents to PT with impaired strength, endurance, and balance limiting overall mobility. Pt transferred to EOB with min A x1 and stood with CGA. Pt ambulated 30ft +15ft with rwx and CGA. Pt mildly unsteady initially, prompting use of rwx. With progressed gait distance, pt more independent though fatigues quickly. Gait distance limited 2/2 fatigue and transport arriving to take pt COLBY. Pt transferred sit<>supine with CGA and assist repositioning with draw sheet. Pt c/o significant dizziness with transfer to supine, requiring increased assist. PT will continue to follow to progress mobility as tolerated. Anticipate DC home with HHPT pending progress.     Time Calculation:    PT Charges     Row Name 12/26/22 1031             Time Calculation    Start Time 0923  -      Stop Time 0945  -      Time Calculation (min) 22 min  -      PT Received On 12/26/22  -      PT - Next Appointment 12/27/22  -      PT Goal Re-Cert Due Date 01/02/23  -         Time Calculation- PT    Total Timed Code Minutes- PT 20 minute(s)  -         Timed Charges    77568 - Gait Training Minutes  10  -      06850 - PT Therapeutic Activity Minutes 10  -         Untimed Charges    PT Eval/Re-eval Minutes 5  -         Total Minutes    Timed Charges Total Minutes 20  -      Untimed Charges Total Minutes 5  -       Total Minutes 25  -            User Key  (r) = Recorded By, (t) = Taken By, (c) = Cosigned By    Initials Name Provider Type    Kia Stone  BRIANNE, PT Physical Therapist              Therapy Charges for Today     Code Description Service Date Service Provider Modifiers Qty    14334774032 HC GAIT TRAINING EA 15 MIN 12/26/2022 Kia Pickett, PT GP 1    63622482663 HC PT EVAL MOD COMPLEXITY 3 12/26/2022 Kia Pickett, PT GP 1          PT G-Codes  Outcome Measure Options: AM-PAC 6 Clicks Basic Mobility (PT)  AM-PAC 6 Clicks Score (PT): 16  PT Discharge Summary  Anticipated Discharge Disposition (PT): home with assist, home with home health    Kia Pickett, PT  12/26/2022

## 2022-12-26 NOTE — PLAN OF CARE
Goal Outcome Evaluation:  Plan of Care Reviewed With: patient           Outcome Evaluation: Pt is a 76 yo M admitted following cardiac arrest after an outpatient vascular procedure. Pt intubated from 12/20-12/22, remains NPO with cortrak. Pt lives with his wife and reports independence at BL with no AD. Pt has 10 MONY and a 1st floor bedroom/bathroom. Pt denies recent falls hx. Pt presents to PT with impaired strength, endurance, and balance limiting overall mobility. Pt transferred to EOB with min A x1 and stood with CGA. Pt ambulated 30ft +15ft with rwx and CGA. Pt mildly unsteady initially, prompting use of rwx. With progressed gait distance, pt more independent though fatigues quickly. Gait distance limited 2/2 fatigue and transport arriving to take pt COLBY. Pt transferred sit<>supine with CGA and assist repositioning with draw sheet. Pt c/o significant dizziness with transfer to supine, requiring increased assist. PT will continue to follow to progress mobility as tolerated. Anticipate DC home with HHPT pending progress.

## 2022-12-26 NOTE — THERAPY EVALUATION
Patient Name: Evangelist Garcia  : 1945    MRN: [id number]                              Today's Date: 2022       Admit Date: 2022    Visit Dx:     ICD-10-CM ICD-9-CM   1. Cardiac arrest (HCC)  I46.9 427.5     Patient Active Problem List   Diagnosis   • Cardiac arrest (HCC)     History reviewed. No pertinent past medical history.  Past Surgical History:   Procedure Laterality Date   • ANGIOPLASTY FEMORAL ARTERY Right 2022    Procedure: ANGIOGRAM   FEMORAL ARTERY WITH OPEN  RIGHT ARTERIAL REPAIR RIGHT FEMORAL CUTDOWN;  Surgeon: Noemi Blount MD;  Location: [facility] ;  Service: Vascular;  Laterality: Right;      General Information     Row Name 22 1452          OT Time and Intention    Document Type evaluation  -RE     Mode of Treatment individual therapy;occupational therapy  -RE     Row Name 22 1452          General Information    Patient Profile Reviewed yes  -RE     Prior Level of Function independent:;ADL's  fatigued quickly per wife  -RE     Existing Precautions/Restrictions swallow precautions;fall  -RE     Barriers to Rehab medically complex;cognitive status  -RE     Row Name 22 1452          Living Environment    People in Home spouse  -RE           User Key  (r) = Recorded By, (t) = Taken By, (c) = Cosigned By    Initials Name Provider Type    RE Yulia Simmons OTR Occupational Therapist                 Mobility/ADL's     Row Name 22 1454          Activities of Daily Living    BADL Assessment/Intervention bathing;upper body dressing  -RE     Row Name 22 1454          Bathing Assessment/Intervention    [place] Level (Bathing) upper body;set up;minimum assist (75% patient effort);lower body;moderate assist (50% patient effort);perineal area;standby assist  -RE     Position (Bathing) sitting up in bed  -RE     Row Name 22 1454          Upper Body Dressing Assessment/Training    [place] Level (Upper Body Dressing)  pajama/robe;moderate assist (50% patient effort)  -RE     Position (Upper Body Dressing) sitting up in bed  -RE           User Key  (r) = Recorded By, (t) = Taken By, (c) = Cosigned By    Initials Name Provider Type    Yulia Petit, OTR Occupational Therapist               Obj/Interventions     Row Name 12/26/22 1500          Range of Motion Comprehensive    General Range of Motion bilateral upper extremity ROM WFL  -RE     Row Name 12/26/22 1500          Strength Comprehensive (MMT)    General Manual Muscle Testing (MMT) Assessment upper extremity strength deficits identified  -RE     Comment, General Manual Muscle Testing (MMT) Assessment L UE generally 4-/5 R UE is >/+ 4/5  -RE           User Key  (r) = Recorded By, (t) = Taken By, (c) = Cosigned By    Initials Name Provider Type    Yulia Petit, OTR Occupational Therapist               Goals/Plan     Row Name 12/26/22 1521          Transfer Goal 1 (OT)    Activity/Assistive Device (Transfer Goal 1, OT) toilet  -RE     [place] Level/Cues Needed (Transfer Goal 1, OT) minimum assist (75% or more patient effort)  -RE     Time Frame (Transfer Goal 1, OT) long term goal (LTG);by discharge  -RE     Progress/Outcome (Transfer Goal 1, OT) continuing progress toward goal  -RE     Row Name 12/26/22 1521          Bathing Goal 1 (OT)    Activity/Device (Bathing Goal 1, OT) bathing skills, all;long-handled sponge  -RE     [place] Level/Cues Needed (Bathing Goal 1, OT) minimum assist (75% or more patient effort)  -RE     Time Frame (Bathing Goal 1, OT) long term goal (LTG);by discharge  -RE     Progress/Outcomes (Bathing Goal 1, OT) continuing progress toward goal  -RE     Row Name 12/26/22 1521          Dressing Goal 1 (OT)    Activity/Device (Dressing Goal 1, OT) upper body dressing  -RE     [place]/Cues Needed (Dressing Goal 1, OT) set-up required  -RE     Time Frame (Dressing Goal 1, OT) long term goal (LTG);by discharge  -RE     Progress/Outcome  (Dressing Goal 1, OT) continuing progress toward goal  -RE     Row Name 12/26/22 1521          Therapy Assessment/Plan (OT)    Planned Therapy Interventions (OT) activity tolerance training;BADL retraining;transfer/mobility retraining;ROM/therapeutic exercise;patient/caregiver education/training  -RE           User Key  (r) = Recorded By, (t) = Taken By, (c) = Cosigned By    Initials Name Provider Type    RE Yulia Simmons OTR Occupational Therapist               Clinical Impression     Row Name 12/26/22 1510          Pain Assessment    Pretreatment Pain Rating 0/10 - no pain  -RE     Posttreatment Pain Rating 0/10 - no pain  -RE     Row Name 12/26/22 1510          Plan of Care Review    Plan of Care Reviewed With patient;spouse  -RE     Progress improving  -RE     Outcome Evaluation Patient presents to OT for evaluation . He was hospitalized following a cardiac arrest during a procedure. He has slightly decreased UE strength in the L UE and overall is deconditioned. His wife states that he had decreased endurance during ADL's PTA. He required Min assist with upper body bathing and dressing and moderate assist with lower body bathing. He is oriented to person place and time but still appears confused at times. This may be because he does not have his hearing aids in today. He could benfit from OT to increase independance with ADL's and transfers.  -RE     Row Name 12/26/22 1510          Therapy Assessment/Plan (OT)    Rehab Potential (OT) good, to achieve stated therapy goals  -RE     Criteria for Skilled Therapeutic Interventions Met (OT) yes;meets criteria;skilled treatment is necessary  -RE     Therapy Frequency (OT) 5 times/wk  -RE     Predicted Duration of Therapy Intervention (OT) 2 weeks  -RE     Row Name 12/26/22 2290          Therapy Plan Review/Discharge Plan (OT)    Equipment Needs Upon Discharge (OT) shower chair;tub bench  -RE     Anticipated Discharge Disposition (OT) inpatient rehabilitation  facility;sub acute care setting  -RE     Row Name 12/26/22 1510          Positioning and Restraints    Pre-Treatment Position in bed  -RE     Post Treatment Position bed  -RE     In Bed supine;exit alarm on;call light within reach;side rails up x2;with family/caregiver  -RE           User Key  (r) = Recorded By, (t) = Taken By, (c) = Cosigned By    Initials Name Provider Type    Yulia Petit OTR Occupational Therapist               Outcome Measures     Row Name 12/26/22 1523          How much help from another is currently needed...    Putting on and taking off regular lower body clothing? 2  -RE     Bathing (including washing, rinsing, and drying) 2  -RE     Toileting (which includes using toilet bed pan or urinal) 2  -RE     Putting on and taking off regular upper body clothing 3  -RE     Taking care of personal grooming (such as brushing teeth) 4  -RE     Eating meals 4  -RE     AM-PAC 6 Clicks Score (OT) 17  -RE     Row Name 12/26/22 1030          How much help from another person do you currently need...    Turning from your back to your side while in flat bed without using bedrails? 3  -BH     Moving from lying on back to sitting on the side of a flat bed without bedrails? 2  -BH     Moving to and from a bed to a chair (including a wheelchair)? 3  -BH     Standing up from a chair using your arms (e.g., wheelchair, bedside chair)? 3  -BH     Climbing 3-5 steps with a railing? 2  -BH     To walk in hospital room? 3  -BH     AM-PAC 6 Clicks Score (PT) 16  -BH     Highest level of mobility 5 --> Static standing  -     Row Name 12/26/22 1523 12/26/22 1030       Functional Assessment    Outcome Measure Options AM-PAC 6 Clicks Daily Activity (OT)  -RE AM-PAC 6 Clicks Basic Mobility (PT)  -          User Key  (r) = Recorded By, (t) = Taken By, (c) = Cosigned By    Initials Name Provider Type    Yulia Petit OTR Occupational Therapist    Kia Stone PT Physical Therapist                 Occupational Therapy Education     Title: PT OT SLP Therapies (Done)     Topic: Occupational Therapy (Done)     Point: ADL training (Done)     Description:   Instruct learner(s) on proper safety adaptation and remediation techniques during self care or transfers.   Instruct in proper use of assistive devices.              Learning Progress Summary           Patient Acceptance, E,TB, VU,NR by RE at 12/26/2022 1524    Comment: Discussed POC; purpose of therapy in gerneral and OT in particular.   Significant Other Acceptance, E,TB, VU,NR by RE at 12/26/2022 1524    Comment: Discussed POC; purpose of therapy in gerneral and OT in particular.                   Point: Home exercise program (Done)     Description:   Instruct learner(s) on appropriate technique for monitoring, assisting and/or progressing therapeutic exercises/activities.              Learning Progress Summary           Patient Acceptance, E,TB, VU,NR by RE at 12/26/2022 1524    Comment: Discussed POC; purpose of therapy in gerneral and OT in particular.   Significant Other Acceptance, E,TB, VU,NR by RE at 12/26/2022 1524    Comment: Discussed POC; purpose of therapy in gerneral and OT in particular.                   Point: Precautions (Done)     Description:   Instruct learner(s) on prescribed precautions during self-care and functional transfers.              Learning Progress Summary           Patient Acceptance, E,TB, VU,NR by RE at 12/26/2022 1524    Comment: Discussed POC; purpose of therapy in gerneral and OT in particular.   Significant Other Acceptance, E,TB, VU,NR by RE at 12/26/2022 1524    Comment: Discussed POC; purpose of therapy in gerneral and OT in particular.                   Point: Body mechanics (Done)     Description:   Instruct learner(s) on proper positioning and spine alignment during self-care, functional mobility activities and/or exercises.              Learning Progress Summary           Patient Acceptance, E,TB, VU,NR by  RE at 12/26/2022 1524    Comment: Discussed POC; purpose of therapy in gerneral and OT in particular.   Significant Other Acceptance, E,TB, VU,NR by RE at 12/26/2022 1524    Comment: Discussed POC; purpose of therapy in gerneral and OT in particular.                               User Key     Initials Effective Dates Name Provider Type Discipline    RE 06/16/21 -  Yulia Simmons OTLICHA Occupational Therapist OT              OT Recommendation and Plan  Planned Therapy Interventions (OT): activity tolerance training, BADL retraining, transfer/mobility retraining, ROM/therapeutic exercise, patient/caregiver education/training  Therapy Frequency (OT): 5 times/wk  Plan of Care Review  Plan of Care Reviewed With: patient, spouse  Progress: improving  Outcome Evaluation: Patient presents to OT for evaluation . He was hospitalized following a cardiac arrest during a procedure. He has slightly decreased UE strength in the L UE and overall is deconditioned. His wife states that he had decreased endurance during ADL's PTA. He required Min assist with upper body bathing and dressing and moderate assist with lower body bathing. He is oriented to person place and time but still appears confused at times. This may be because he does not have his hearing aids in today. He could benfit from OT to increase independance with ADL's and transfers.     Time Calculation:    Time Calculation- OT     Row Name 12/26/22 1526 12/26/22 1031          Time Calculation- OT    OT Start Time 1245  -RE --     OT Stop Time 1330  -RE --     OT Time Calculation (min) 45 min  -RE --     Total Timed Code Minutes- OT 35 minute(s)  -RE --        Timed Charges    41128 - Gait Training Minutes  -- 10  -BH     59333 - OT Self Care/Mgmt Minutes 35  -RE --        Untimed Charges    OT Eval/Re-eval Minutes 10  -RE --        Total Minutes    Timed Charges Total Minutes 35  -RE 10  -BH     Untimed Charges Total Minutes 10  -RE --      Total Minutes 45  -RE 10  -BH            User Key  (r) = Recorded By, (t) = Taken By, (c) = Cosigned By    Initials Name Provider Type    RE Yulia Simmons OTR Occupational Therapist    Kia Stone, PT Physical Therapist              Therapy Charges for Today     Code Description Service Date Service Provider Modifiers Qty    11920888926  OT SELF CARE/MGMT/TRAIN EA 15 MIN 12/26/2022 Yulia Simmons OTR GO 2    [id number]  OT EVAL LOW COMPLEXITY 2 12/26/2022 Yulia Simmons OTR GO 1               GIN Cruz  12/26/2022

## 2022-12-26 NOTE — THERAPY RE-EVALUATION
Acute Care - Speech Language Pathology   Swallow Initial Evaluation  [facility]     Patient Name: Evangelist Garcia  : 1945  MRN: [id number]  Today's Date: 2022               Admit Date: 2022    Visit Dx:     ICD-10-CM ICD-9-CM   1. Cardiac arrest (HCC)  I46.9 427.5     Patient Active Problem List   Diagnosis   • Cardiac arrest (HCC)     History reviewed. No pertinent past medical history.  Past Surgical History:   Procedure Laterality Date   • ANGIOPLASTY FEMORAL ARTERY Right 2022    Procedure: ANGIOGRAM   FEMORAL ARTERY WITH OPEN  RIGHT ARTERIAL REPAIR RIGHT FEMORAL CUTDOWN;  Surgeon: Noemi Blount MD;  Location: [facility] OR ;  Service: Vascular;  Laterality: Right;       SLP Recommendation and Plan  SLP Swallowing Diagnosis: oral dysphagia, suspected pharyngeal dysphagia (22)  SLP Diet Recommendation: puree, honey thick liquids, water between meals after oral care, with supervision (22)  Recommended Precautions and Strategies: upright posture during/after eating, small bites of food and sips of liquid (22)  SLP Rec. for Method of Medication Administration: meds whole, meds crushed, with puree (22)     Monitor for Signs of Aspiration: yes, notify SLP if any concerns (22)  Recommended Diagnostics: VFSS (MBS) (22)  Swallow Criteria for Skilled Therapeutic Interventions Met: demonstrates skilled criteria (22)  Anticipated Discharge Disposition (SLP): home with home health (22)  Rehab Potential/Prognosis, Swallowing: good, to achieve stated therapy goals (22)  Therapy Frequency (Swallow): PRN (22)  Predicted Duration Therapy Intervention (Days): until discharge (22)     Plan of Care Reviewed With: patient  Outcome Evaluation: Clinical swallow re-evaluation completed. Patient with some improvement from previous evaluation. Noted wet voicing with cup sip of thin  liquids, multiple swallows with nectar thick liquids. No overt s/s of aspiration with puree or honey thick liquid trials. Recommend: pureed diet with honey thick liquids. Meds in puree. Small sips of water 30 minutes after meals following oral care. Upright for PO intake, small bites/sips, encourage PO intake. Plan for VFSS to further assess swallow function and determine least restrictive diet. SLP to also follow for need for cognitive linguistic evaluation.      SWALLOW EVALUATION (last 72 hours)     SLP Adult Swallow Evaluation     Row Name 12/26/22 1300 12/24/22 0900                Rehab Evaluation    Document Type re-evaluation  -HS evaluation  -AW       Subjective Information no complaints  -HS no complaints  -AW       Patient Observations alert;cooperative  some confusion, trying to use TV remote as phone  -HS alert;cooperative;agree to therapy  -AW       Patient/Family/Caregiver Comments/Observations No family present during evaluation  -HS Pt became groggy during eval.  -AW       Patient Effort good  -HS good  -AW       Symptoms Noted During/After Treatment none  -HS fatigue  -AW          General Information    Patient Profile Reviewed yes  -HS yes  -AW       Pertinent History Of Current Problem Pt admitted w/ resuscitated cardiac arrest and respiratory failure; intubated 12/20 - 12/23. Clincal swallow evaluation 12/24/22 rec: NPO.  -HS Pt admitted w/ resuscitated cardiac arrest and respiratory failure; intubated 12/20 - 12/23.  -AW       Current Method of Nutrition NPO;nasogastric feedings  -HS NPO;nasogastric feedings  -AW       Precautions/Limitations, Vision WFL;for purposes of eval  -HS WFL;for purposes of eval  -AW       Precautions/Limitations, Hearing WFL;for purposes of eval  -HS WFL;for purposes of eval  -AW       Prior Level of Function-Communication WFL  -HS unknown  -AW       Prior Level of Function-Swallowing no diet consistency restrictions;safe, efficient swallowing in all situations  -HS no  diet consistency restrictions  -AW       Plans/Goals Discussed with patient;agreed upon  -HS patient;agreed upon  -AW       Barriers to Rehab none identified  -HS none identified  -AW       Patient's Goals for Discharge return to PO diet  -HS return to PO diet  -AW          Pain    Additional Documentation -- Pain Scale: Numbers Pre/Post-Treatment (Group)  -AW          Pain Scale: Numbers Pre/Post-Treatment    Pretreatment Pain Rating 0/10 - no pain  -HS 0/10 - no pain  -AW       Posttreatment Pain Rating 0/10 - no pain  -HS 0/10 - no pain  -AW          Oral Motor Structure and Function    Dentition Assessment natural, present and adequate  -HS natural, present and adequate  -AW       Secretion Management WNL/WFL  -HS WNL/WFL  -AW       Mucosal Quality moist, healthy  -HS moist, healthy  -AW          Oral Musculature and Cranial Nerve Assessment    Oral Motor General Assessment WFL  -HS WFL  -AW          General Eating/Swallowing Observations    Respiratory Support Currently in Use -- room air  -AW       Eating/Swallowing Skills self-fed;fed by SLP;appropriate self-feeding skills observed  -HS fed by SLP  -AW       Positioning During Eating upright in bed  -HS upright in bed  -AW       Utensils Used cup;spoon  -HS cup;spoon  -AW       Consistencies Trialed ice chips;thin liquids;nectar/syrup-thick liquids;honey-thick liquids;pureed  -HS ice chips;thin liquids;nectar/syrup-thick liquids;pureed;soft to chew textures  -AW       Pre SpO2 (%) -- 93  -AW       Post SpO2 (%) -- 94  -AW          Clinical Swallow Eval    Clinical Swallow Evaluation Summary Clinical swallow re-evaluation completed. Patient with some improvement from previous evaluation. Noted wet voicing with cup sip of thin liquids, multiple swallows with nectar thick liquids. Patient reported he felt like his throat \"closed up\". No overt s/s of aspiration with puree or honey thick liquid trials.  -HS Clinical swallow eval completed. Pt c/o pain when  swallowing and needed encouragement to take trials. Pt tolerated ice chips and tsp sips of water. Throal clearing and cough noted after cup/straw sips of thin. Pt had delayed coughing after trials of nectar thick, pureed, and soft. Pt stated it felt like the food wouldn't go down. Recommend NPO w/ nutrition and meds via [employer]. Pt can have ice chips or sips of water after oral care. ST to follow.  -AW          SLP Evaluation Clinical Impression    SLP Swallowing Diagnosis oral dysphagia;suspected pharyngeal dysphagia  -HS oral dysphagia;suspected pharyngeal dysphagia  -AW       Functional Impact risk of aspiration/pneumonia;risk of malnutrition;risk of dehydration  -HS risk of aspiration/pneumonia  -AW       Rehab Potential/Prognosis, Swallowing good, to achieve stated therapy goals  -HS good, to achieve stated therapy goals  -AW       Swallow Criteria for Skilled Therapeutic Interventions Met demonstrates skilled criteria  -HS demonstrates skilled criteria  -AW          Recommendations    Therapy Frequency (Swallow) PRN  -HS PRN  -AW       Predicted Duration Therapy Intervention (Days) until discharge  -HS until discharge  -AW       SLP Diet Recommendation puree;honey thick liquids;water between meals after oral care, with supervision  -HS NPO;ice chips between meals after oral care, with supervision;water between meals after oral care, with supervision;temporary alternate methods of nutrition/hydration  -AW       Recommended Diagnostics VFSS (MBS)  -HS reassess via clinical swallow evaluation  -AW       Recommended Precautions and Strategies upright posture during/after eating;small bites of food and sips of liquid  -HS upright posture during/after eating;small bites of food and sips of liquid  -AW       Oral Care Recommendations Oral Care BID/PRN  -HS Oral Care BID/PRN  -AW       SLP Rec. for Method of Medication Administration meds whole;meds crushed;with puree  -HS meds via alternate route  -AW       Monitor  for Signs of Aspiration yes;notify SLP if any concerns  -HS yes  -AW       Anticipated Discharge Disposition (SLP) home with home health  -HS unknown  -AW             User Key  (r) = Recorded By, (t) = Taken By, (c) = Cosigned By    Initials Name Effective Dates    Mariia Cross MS CCC-SLP 06/08/18 -     AW Tonya Fuller MS CCC-SLP 06/16/21 -                 EDUCATION  The patient has been educated in the following areas:   Dysphagia (Swallowing Impairment) Oral Care/Hydration Modified Diet Instruction.              Time Calculation:    Time Calculation- SLP     Row Name 12/26/22 1319             Time Calculation- SLP    SLP Start Time 1115  -HS      SLP Received On 12/26/22  -HS         Untimed Charges    65712-TL Treatment Swallow Minutes 60  -HS         Total Minutes    Untimed Charges Total Minutes 60  -HS       Total Minutes 60  -HS            User Key  (r) = Recorded By, (t) = Taken By, (c) = Cosigned By    Initials Name Provider Type     Mariia Rubalcava MS CCC-SLP Speech and Language Pathologist                Therapy Charges for Today     Code Description Service Date Service Provider Modifiers Qty    [id number]  ST TREATMENT SWALLOW 4 12/26/2022 Mariia Rubalcava MS CCC-SLP GN 1               Mariia Rubalcava MS CCC-SLP  12/26/2022

## 2022-12-26 NOTE — PROGRESS NOTES
Nutrition Services    Patient Name:  Evangelist Garcia  YOB: 1945  MRN: [id number]  Admit Date:  12/20/2022    PROGRESS NOTE - CLINICAL NUTRITION    Assessment Date:  12/26/22    Comments: Follow up for tube feeding    Pt had Peptamen VHP infusing at 50 mL/hr, 30 mL water flushes Q 4 hours when RD rounded just after lunch time. Tolerating per nursing.     SLP evaluation done this afternoon. Diet was advanced to Consistent Carb, Healthy Heart PUREED texture with HONEY thick liquids after lunch time. VFSS planned to further assess swallow function. Per RN MD Janet has given her orders to d/c the Cortrak tube and tube feedings.     Plan/Recommendations:  1. Monitor PO intake at meals and diet tolerance.  2. Offer supplements if PO intake inadequate.   3. RD to continue to monitor per protocol.     Encounter Information         Reason For Encounter Follow up for TF    Current Issues Working with OT at time of rounding. SLP evaluation done - diet advanced. Dr. Christina gave RN orders to dc the Cortrak & TF.      Estimated Requirements         Nutrient Needs:   4601-7182 kcal, 109 gm pro, 2260 ml fluid     Current Nutrition Orders & Evaluation of Intake       Oral Nutrition     Current PO Diet Diet: Cardiac Diets, Diabetic Diets; Healthy Heart (2-3 Na+); Consistent Carbohydrate; Texture: Pureed (NDD 1); Fluid Consistency: Honey Thick   Supplement n/a   PO Evaluation     Trending % PO Intake UTD - diet just advanced    Factors Affecting Intake  swallow impairment, weakness      Enteral Nutrition     Enteral Route ND    TF Delivery Method Continuous    Enteral Product Peptamen Intense VHP    Modular -    Propofol Rate/Kcal -    TF Current Rate (mL) 50    TF Goal Rate (mL) 50    Current Water Flush (mL) 30 ml Q 4 hr    Current TF Provision  1200 kcal, 110 gm protein, 1008 mL free water + 180 mL water flushes         Calories 53 % needs met         Protein  100 % needs met         TF Fluid (mL) 1188 ml         IV  Fluids -    Avg Volume Delivered (mL) 828 / 2 days    % Goal Volume 69%    TF Residual  other: n/a    TF Changes none - plan to dc per JESSICA Gonzales    TF Tolerance tolerating     Anthropometrics          Height    Weight Height: 177.8 cm (70\")  Weight: 115 kg (253 lb 12 oz) (12/26/22 0500)    BMI kg/m2 Body mass index is 36.41 kg/m².    Weight trend Gain   ..      12/24/22  0534 12/25/22  0419 12/26/22  0500   Weight: 112 kg (246 lb 7.6 oz) 111 kg (244 lb 4.3 oz) 115 kg (253 lb 12 oz)        Labs        Pertinent Labs Reviewed, listed below     Results from last 7 days   Lab Units 12/26/22  0544 12/25/22  0517 12/24/22  0252 12/20/22  1559 12/20/22  1534   SODIUM mmol/L 138 138 143   < > 141   POTASSIUM mmol/L 3.7 3.7 3.6   < > 4.5   CHLORIDE mmol/L 103 106 109*   < > 108*   CO2 mmol/L 23.6 24.2 23.5   < > 25.0   BUN mg/dL 28* 25* 21   < > 27*   CREATININE mg/dL 1.35* 1.45* 1.39*   < > 1.64*   CALCIUM mg/dL 9.6 9.5 8.5*   < > 8.6   BILIRUBIN mg/dL  --   --   --   --  0.5   ALK PHOS U/L  --   --   --   --  58   ALT (SGPT) U/L  --   --   --   --  21   AST (SGOT) U/L  --   --   --   --  34   GLUCOSE mg/dL 183* 169* 166*   < > 168*    < > = values in this interval not displayed.     Results from last 7 days   Lab Units 12/26/22  0544 12/25/22  0517 12/24/22  0252 12/21/22  0450 12/20/22  1534   MAGNESIUM mg/dL  --  2.1 1.6  --  1.6   PHOSPHORUS mg/dL 2.8 2.6 2.0*   < >  --    HEMOGLOBIN g/dL 12.3* 11.2* 10.9*   < > 12.3*   HEMATOCRIT % 35.7* 33.4* 31.3*   < > 37.3*   WBC 10*3/mm3 6.81 6.21 6.65   < > 5.88   ALBUMIN g/dL 3.50 3.10* 3.10*   < > 3.30*    < > = values in this interval not displayed.     Results from last 7 days   Lab Units 12/26/22  0544 12/25/22  0517 12/24/22  0252 12/23/22  0354 12/22/22  0358 12/21/22  0450 12/21/22  0413 12/20/22 2237   INR   --   --   --   --   --   --   --  1.09   APTT seconds  --   --   --   --   --   --  35.3 31.5   PLATELETS 10*3/mm3 211 196 135* 119* 137*   < >  --   --     < > =  values in this interval not displayed.     No results found for: COVID19  Lab Results   Component Value Date    HGBA1C 9.2 (H) 09/28/2022          Medications            Scheduled Medications alteplase, 2 mg, Intracatheter, Once  apixaban, 5 mg, Oral, Q12H  aspirin, 81 mg, Oral, Daily  Gabapentin, 400 mg, Nasogastric, Q12H  insulin regular, 0-14 Units, Subcutaneous, Q6H  levothyroxine, 100 mcg, Oral, Daily  metoprolol tartrate, 12.5 mg, Oral, Q12H  piperacillin-tazobactam, 3.375 g, Intravenous, Once  piperacillin-tazobactam, 3.375 g, Intravenous, Q8H  sodium chloride, 10 mL, Intravenous, Q12H  terazosin, 1 mg, Nasogastric, Nightly        Infusions      PRN Medications •  acetaminophen  •  albuterol  •  dextrose  •  dextrose  •  glucagon (human recombinant)  •  hydrALAZINE  •  magnesium sulfate **OR** magnesium sulfate **OR** magnesium sulfate  •  potassium & sodium phosphates **OR** potassium & sodium phosphates  •  potassium chloride **OR** potassium chloride **OR** potassium chloride  •  [COMPLETED] Insert Peripheral IV **AND** sodium chloride  •  sodium chloride  •  sodium chloride  •  sodium phosphate IVPB **OR** sodium phosphate IVPB **OR** sodium phosphate IVPB     Physical Findings          Physical Appearance alert   Oral/Mouth Cavity WNL, teeth missing   Edema  2+ (mild)   Gastrointestinal last bowel movement:12/26   Skin  skin intact, R groin incision   Tubes/Drains Cortrak, ND tube - to be d/c'd   NFPE Not applicable at this time     NUTRITION INTERVENTION / PLAN OF CARE  Intervention Goal         Intervention Goal(s) Maintain nutrition status, Establish PO intake, Advance diet and PO intake goal %: 75+     Nutrition Intervention         RD Action Await begin PO diet, Follow Tx Progress and Care plan reviewed supplements if PO suboptimal     Prescription         Diet Prescription     Supplement Prescription    EN/PN Prescription    New Prescription Ordered? Continue same per protocol    --  Monitor/Evaluation        Monitor Per protocol, I&O, PO intake, Pertinent labs, Weight, Skin status, GI status, Symptoms, POC/GOC, Swallow function   Discharge Needs Pending clinical course   Education Will instruct as appropriate       Electronically signed by:  Stephanie Head RD  12/26/22 15:02 EST

## 2022-12-26 NOTE — PLAN OF CARE
Goal Outcome Evaluation:  Plan of Care Reviewed With: patient   Outcome Evaluation: Clinical swallow re-evaluation completed. Patient with some improvement from previous evaluation. Noted wet voicing with cup sip of thin liquids, multiple swallows with nectar thick liquids. No overt s/s of aspiration with puree or honey thick liquid trials. Recommend: pureed diet with honey thick liquids. Meds in puree. Small sips of water 30 minutes after meals following oral care. Upright for PO intake, small bites/sips, encourage PO intake. Plan for VFSS to further assess swallow function and determine least restrictive diet. SLP to also follow for need for cognitive linguistic evaluation.

## 2022-12-26 NOTE — PLAN OF CARE
Problem: Fall Injury Risk  Goal: Absence of Fall and Fall-Related Injury  Intervention: Identify and Manage Contributors  Recent Flowsheet Documentation  Taken 12/25/2022 1845 by Carolynn Forbes RN  Medication Review/Management: medications reviewed  Taken 12/25/2022 1645 by Carolynn Forbes RN  Medication Review/Management: medications reviewed  Taken 12/25/2022 1500 by Carolynn Forbes RN  Medication Review/Management: medications reviewed  Taken 12/25/2022 1250 by Carolynn Forbes RN  Medication Review/Management: medications reviewed  Taken 12/25/2022 1045 by Carolynn Forbes RN  Medication Review/Management: medications reviewed  Intervention: Promote Injury-Free Environment  Recent Flowsheet Documentation  Taken 12/25/2022 1845 by Carolynn Forbes RN  Safety Promotion/Fall Prevention:   safety round/check completed   room organization consistent   nonskid shoes/slippers when out of bed   lighting adjusted   fall prevention program maintained   clutter free environment maintained   assistive device/personal items within reach  Taken 12/25/2022 1645 by Carolynn Forbes RN  Safety Promotion/Fall Prevention:   safety round/check completed   room organization consistent   nonskid shoes/slippers when out of bed   lighting adjusted   fall prevention program maintained   clutter free environment maintained   assistive device/personal items within reach  Taken 12/25/2022 1250 by Carolynn Forbes RN  Safety Promotion/Fall Prevention:   safety round/check completed   room organization consistent   nonskid shoes/slippers when out of bed   lighting adjusted   fall prevention program maintained   clutter free environment maintained   assistive device/personal items within reach  Taken 12/25/2022 1045 by Carolynn Forbes RN  Safety Promotion/Fall Prevention:   safety round/check completed   room organization consistent   nonskid shoes/slippers when out of bed   lighting adjusted   fall prevention program  maintained   clutter free environment maintained   assistive device/personal items within reach   Goal Outcome Evaluation:  Plan of Care Reviewed With: patient        Progress: improving  Outcome Evaluation: dowgraded telemetry today, vitals stable, alert orient, forgetful, cortrak in place with feedings at goal, autoflush 30/4hrs, xie in place r/t retention, quad lumen left neck, no blood return noted, all lumens flush without difficulty, iv abs for pna, cxr in am, pt moderately wk, PT to see, ctm

## 2022-12-26 NOTE — CASE MANAGEMENT/SOCIAL WORK
Continued Stay Note  [facility]     Patient Name: Evangelist Garcia  MRN: [id number]  Today's Date: 12/26/2022    Admit Date: 12/20/2022    Plan: SNF   Discharge Plan     Row Name 12/26/22 1359       Plan    Plan SNF    Patient/Family in Agreement with Plan yes    Plan Comments Spoke with [facility] who states they are not taking admissions at this time due to a COVID outbreak.  Discussed with pt and spouse who request referral to [facility].  List of facilities provided using CMS compare.  Wife to provide additional choices.  CCP continues to follow.  JUAN Terrell RN               Discharge Codes    No documentation.               Expected Discharge Date and Time     Expected Discharge Date Expected Discharge Time    Dec 28, 2022             Lillie Terrell, RN

## 2022-12-26 NOTE — PLAN OF CARE
Goal Outcome Evaluation:  Plan of Care Reviewed With: patient        Progress: improving  Outcome Evaluation: Pt alert, orient x2-3. Reoriented as needed. NG tube d/c per order, tube feeds stopped. Diet advanced by speech therapy to puree and HTL. Pt tolerating this well for dinner. Jimenez d/c, pt with x1 large incontinent void after removal. Possible d/c soon to SNF.

## 2022-12-26 NOTE — PLAN OF CARE
Goal Outcome Evaluation:  Plan of Care Reviewed With: patient, spouse        Progress: improving  Outcome Evaluation: Patient presents to OT for evaluation . He was hospitalized following a cardiac arrest during a procedure. He has slightly decreased UE strength in the L UE and overall is deconditioned. His wife states that he had decreased endurance during ADL's PTA. He required Min assist with upper body bathing and dressing and moderate assist with lower body bathing. He is oriented to person place and time but still appears confused at times. This may be because he does not have his hearing aids in today. He could benfit from OT to increase independance with ADL's and transfers.

## 2022-12-26 NOTE — DISCHARGE PLACEMENT REQUEST
Enedina Gooden (77 y.o. Male)     Date of Birth   1945    Social Security Number       Address   [place] [address]    Home Phone   [phone]    MRN   [id number]       [employer]   [place]    Marital Status                               Admission Date   12/20/22    Admission Type   Emergency    Admitting Provider   Jose Salmon MD    Attending Provider   Howie Christina MD    Department, Room/Bed   [address], [address]/1       Discharge Date       Discharge Disposition       Discharge Destination                               Attending Provider: Howie Christina MD    Allergies: Nsaids    Isolation: None   Infection: None   Code Status: CPR    Ht: 177.8 cm (70\")   Wt: 115 kg (253 lb 12 oz)    Admission Cmt: None   Principal Problem: Cardiac arrest (HCC) [I46.9]                 Active Insurance as of 12/20/2022     Primary Coverage     Payor Plan Insurance Group Employer/Plan Group    MEDICARE MEDICARE A & B      Payor Plan Address Payor Plan Phone Number Payor Plan Fax Number Effective Dates    [address] [phone]  4/1/2008 - None Entered    [facility] [id number]       Subscriber Name Subscriber Birth Date Member ID       ENEDINA GOODEN 1945 [id number]           Secondary Coverage     Payor Plan Insurance Group Employer/Plan Group    [facility] SUPP [id number]     Payor Plan Address Payor Plan Phone Number Payor Plan Fax Number Effective Dates    [address]   12/1/2016 - None Entered    [facility] [id number]       Subscriber Name Subscriber Birth Date Member ID       ENEDINA GOODEN 1945 [id number]                 Emergency Contacts      (Rel.) Home Phone Work Phone Mobile Phone    JoseJamila (Spouse) [phone] -- --    Angels CampSofy dasilva (Daughter) -- -- [phone]

## 2022-12-26 NOTE — PROGRESS NOTES
[facility]  [phone]  Dr. Howie Christina    Subjective:  LOS: 6    Chief Complaint: Resuscitated cardiac arrest    Awake and alert. Answers all questions.  Now taking mechanical soft diet.    Objective   Vital Signs past 24hrs    Temp range: Temp (24hrs), Av.2 °F (36.8 °C), Min:97.5 °F (36.4 °C), Max:98.8 °F (37.1 °C)    BP range: BP: (155-177)/(78-94) 157/90  Pulse range: Heart Rate:  [72-88] 86  Resp rate range: Resp:  [18-24] 24    Device (Oxygen Therapy): room air   Oxygen range:SpO2:  [90 %-94 %] 91 %      115 kg (253 lb 12 oz); Body mass index is 36.41 kg/m².    Intake/Output Summary (Last 24 hours) at 2022 1812  Last data filed at 2022 1700  Gross per 24 hour   Intake 1844 ml   Output 1950 ml   Net -106 ml       Physical Exam  Constitutional:       Interventions: He is restrained.   Eyes:      Pupils: Pupils are equal, round, and reactive to light.   Cardiovascular:      Rate and Rhythm: Normal rate and regular rhythm.      Heart sounds: No murmur heard.  Pulmonary:      Effort: Pulmonary effort is normal.      Breath sounds: Normal breath sounds.   Abdominal:      General: Bowel sounds are normal.      Palpations: Abdomen is soft. There is no mass.      Tenderness: There is no abdominal tenderness.   Musculoskeletal:         General: No swelling.       Results Review:    I have reviewed the laboratory and imaging data since the last note by LPC physician.  My annotations are noted in assessment and plan.    Medication Review:  I have reviewed the current MAR.  My annotations are noted in assessment and plan.       Plan   Lines, Drains & Airways     Active LDAs     Name Placement date Placement time Site Days    Peripheral IV 22 1532 Anterior;Left Forearm 22  1532  Forearm  less than 1    Peripheral IV 22 1656 Anterior;Proximal;Right;Upper Arm 22  1656  Arm  less than 1    Peripheral IV 22 1652 Anterior;Left;Upper Arm 22  1652  Arm  less  than 1    Urethral Catheter Non-latex 16 Fr. 12/20/22  1901  -- less than 1    ETT  12/20/22  1521  -- less than 1    Arterial Line 12/20/22 Right Femoral 12/20/22  --  Femoral  1              PCCM Problems  Resuscitated cardiac arrest with PEA and bradycardia  Acute respiratory failure, mechanical ventilation  Severe acute metabolic lactic acidosis  Right femoral artery revascularization procedure, 12/20/2022  Bilateral infiltrates, ? 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  No other intervention for now per cardiology.  Zio monitor on discharge.    Bilateral infiltrates and patient is on Zosyn and vancomycin to cover for likely aspiration. CxR shows clearing of infiltrates.      Some confusion due to acute metabolic encephalopathy.  Now improved.    CKD 3B and currently creatinine looks like its baseline.    History recurrent DVTs. OK to resume full AC per vascular.    Now on modified diet.    SS for DM2. Resume home Lantus.    Plan was home but appears that patient needs rehab. Wait for placement.    Howie Christina MD  12/26/22  18:12 EST      Part of this note may be an electronic transcription/translation of spoken language to printed text using the Dragon Dictation System.

## 2022-12-26 NOTE — PLAN OF CARE
Goal Outcome Evaluation:  VSS except BP elevated. Hydralazine IV PRN given. Tolerating TF at goal rate. Turning every 2 hours. FC with good UOP. Pulmonary hygiene encouraged. Will continue to monitor.

## 2022-12-27 ENCOUNTER — APPOINTMENT (OUTPATIENT)
Dept: GENERAL RADIOLOGY | Facility: HOSPITAL | Age: 77
DRG: 252 | End: 2022-12-27
Payer: MEDICARE

## 2022-12-27 LAB
ALBUMIN SERPL-MCNC: 3.3 G/DL (ref 3.5–5.2)
ANION GAP SERPL CALCULATED.3IONS-SCNC: 9.6 MMOL/L (ref 5–15)
BUN SERPL-MCNC: 28 MG/DL (ref 8–23)
BUN/CREAT SERPL: 16.4 (ref 7–25)
CALCIUM SPEC-SCNC: 9.2 MG/DL (ref 8.6–10.5)
CHLORIDE SERPL-SCNC: 106 MMOL/L (ref 98–107)
CO2 SERPL-SCNC: 24.4 MMOL/L (ref 22–29)
CREAT SERPL-MCNC: 1.71 MG/DL (ref 0.76–1.27)
DEPRECATED RDW RBC AUTO: 48.5 FL (ref 37–54)
EGFRCR SERPLBLD CKD-EPI 2021: 40.2 ML/MIN/1.73
ERYTHROCYTE [DISTWIDTH] IN BLOOD BY AUTOMATED COUNT: 13.1 % (ref 12.3–15.4)
GLUCOSE BLDC GLUCOMTR-MCNC: 126 MG/DL (ref 70–130)
GLUCOSE BLDC GLUCOMTR-MCNC: 126 MG/DL (ref 70–130)
GLUCOSE BLDC GLUCOMTR-MCNC: 188 MG/DL (ref 70–130)
GLUCOSE BLDC GLUCOMTR-MCNC: 253 MG/DL (ref 70–130)
GLUCOSE SERPL-MCNC: 136 MG/DL (ref 65–99)
HCT VFR BLD AUTO: 35 % (ref 37.5–51)
HGB BLD-MCNC: 11.6 G/DL (ref 13–17.7)
MCH RBC QN AUTO: 33.9 PG (ref 26.6–33)
MCHC RBC AUTO-ENTMCNC: 33.1 G/DL (ref 31.5–35.7)
MCV RBC AUTO: 102.3 FL (ref 79–97)
PHOSPHATE SERPL-MCNC: 3.8 MG/DL (ref 2.5–4.5)
PLATELET # BLD AUTO: 195 10*3/MM3 (ref 140–450)
PMV BLD AUTO: 9.1 FL (ref 6–12)
POTASSIUM SERPL-SCNC: 3.8 MMOL/L (ref 3.5–5.2)
RBC # BLD AUTO: 3.42 10*6/MM3 (ref 4.14–5.8)
SODIUM SERPL-SCNC: 140 MMOL/L (ref 136–145)
WBC NRBC COR # BLD: 6.32 10*3/MM3 (ref 3.4–10.8)

## 2022-12-27 PROCEDURE — 82962 GLUCOSE BLOOD TEST: CPT

## 2022-12-27 PROCEDURE — 94664 DEMO&/EVAL PT USE INHALER: CPT

## 2022-12-27 PROCEDURE — 94799 UNLISTED PULMONARY SVC/PX: CPT

## 2022-12-27 PROCEDURE — 97530 THERAPEUTIC ACTIVITIES: CPT

## 2022-12-27 PROCEDURE — 63710000001 INSULIN REGULAR HUMAN PER 5 UNITS: Performed by: INTERNAL MEDICINE

## 2022-12-27 PROCEDURE — 85027 COMPLETE CBC AUTOMATED: CPT | Performed by: INTERNAL MEDICINE

## 2022-12-27 PROCEDURE — 25010000002 PIPERACILLIN SOD-TAZOBACTAM PER 1 G: Performed by: INTERNAL MEDICINE

## 2022-12-27 PROCEDURE — 92611 MOTION FLUOROSCOPY/SWALLOW: CPT

## 2022-12-27 PROCEDURE — 94761 N-INVAS EAR/PLS OXIMETRY MLT: CPT

## 2022-12-27 PROCEDURE — 97116 GAIT TRAINING THERAPY: CPT

## 2022-12-27 PROCEDURE — 74230 X-RAY XM SWLNG FUNCJ C+: CPT

## 2022-12-27 PROCEDURE — 80069 RENAL FUNCTION PANEL: CPT | Performed by: INTERNAL MEDICINE

## 2022-12-27 RX ORDER — GABAPENTIN 400 MG/1
400 CAPSULE ORAL EVERY 12 HOURS SCHEDULED
Status: DISCONTINUED | OUTPATIENT
Start: 2022-12-27 | End: 2022-12-28 | Stop reason: HOSPADM

## 2022-12-27 RX ORDER — TAMSULOSIN HYDROCHLORIDE 0.4 MG/1
0.4 CAPSULE ORAL NIGHTLY
Status: DISCONTINUED | OUTPATIENT
Start: 2022-12-27 | End: 2022-12-28 | Stop reason: HOSPADM

## 2022-12-27 RX ADMIN — Medication 10 ML: at 08:36

## 2022-12-27 RX ADMIN — INSULIN HUMAN 8 UNITS: 100 INJECTION, SOLUTION PARENTERAL at 12:57

## 2022-12-27 RX ADMIN — GABAPENTIN 400 MG: 400 CAPSULE ORAL at 08:33

## 2022-12-27 RX ADMIN — METOPROLOL TARTRATE 12.5 MG: 25 TABLET, FILM COATED ORAL at 20:25

## 2022-12-27 RX ADMIN — BARIUM SULFATE 50 ML: 400 SUSPENSION ORAL at 09:36

## 2022-12-27 RX ADMIN — BARIUM SULFATE 4 ML: 980 POWDER, FOR SUSPENSION ORAL at 09:36

## 2022-12-27 RX ADMIN — Medication 10 ML: at 22:08

## 2022-12-27 RX ADMIN — LEVOTHYROXINE SODIUM 100 MCG: 0.1 TABLET ORAL at 06:39

## 2022-12-27 RX ADMIN — TAMSULOSIN HYDROCHLORIDE 0.4 MG: 0.4 CAPSULE ORAL at 20:25

## 2022-12-27 RX ADMIN — INSULIN HUMAN 3 UNITS: 100 INJECTION, SOLUTION PARENTERAL at 16:30

## 2022-12-27 RX ADMIN — GABAPENTIN 400 MG: 400 CAPSULE ORAL at 20:25

## 2022-12-27 RX ADMIN — ASPIRIN 81 MG: 81 TABLET, CHEWABLE ORAL at 08:33

## 2022-12-27 RX ADMIN — APIXABAN 5 MG: 5 TABLET, FILM COATED ORAL at 20:26

## 2022-12-27 RX ADMIN — APIXABAN 5 MG: 5 TABLET, FILM COATED ORAL at 08:33

## 2022-12-27 RX ADMIN — BARIUM SULFATE 55 ML: 0.81 POWDER, FOR SUSPENSION ORAL at 09:36

## 2022-12-27 RX ADMIN — ALBUTEROL SULFATE 2.5 MG: 2.5 SOLUTION RESPIRATORY (INHALATION) at 10:28

## 2022-12-27 RX ADMIN — BARIUM SULFATE 1 TEASPOON(S): 0.6 CREAM ORAL at 09:36

## 2022-12-27 RX ADMIN — TAZOBACTAM SODIUM AND PIPERACILLIN SODIUM 3.38 G: 375; 3 INJECTION, SOLUTION INTRAVENOUS at 16:32

## 2022-12-27 RX ADMIN — TAZOBACTAM SODIUM AND PIPERACILLIN SODIUM 3.38 G: 375; 3 INJECTION, SOLUTION INTRAVENOUS at 08:33

## 2022-12-27 RX ADMIN — METOPROLOL TARTRATE 12.5 MG: 25 TABLET, FILM COATED ORAL at 08:33

## 2022-12-27 RX ADMIN — INSULIN GLARGINE-YFGN 15 UNITS: 100 INJECTION, SOLUTION SUBCUTANEOUS at 22:12

## 2022-12-27 NOTE — PLAN OF CARE
Goal Outcome Evaluation:  Plan of Care Reviewed With: patient        Progress: improving  Outcome Evaluation: Pt alert, orient x2-3. On room air. No c/o pain from pt. Video swallow this am, diet advanced to regular. Central line d/c per order. IV zosyn continued. Pt up with assist x1 and gait belt. No acute events during shift.

## 2022-12-27 NOTE — PROGRESS NOTES
[facility]     Mar/chart reviewed  Follow up resusciated arrest  Doesn't provide subjective  Nursing notes report some confusion    Vital Sign Min/Max for last 24 hours  Temp  Min: 97.7 °F (36.5 °C)  Max: 98.2 °F (36.8 °C)   BP  Min: 103/77  Max: 172/88   Pulse  Min: 65  Max: 86   Resp  Min: 18  Max: 24   SpO2  Min: 91 %  Max: 94 %   Flow (L/min)  Min: 0.5  Max: 1.5   Weight  Min: 113 kg (248 lb 7.3 oz)  Max: 113 kg (248 lb 7.3 oz)     Appears ill, arouses, minimally  perrl, eomi, normal sclera  mmm, no jvd, trachea midline, neck supple,  chest cta bilaterally, no crackles, no wheezes,   rrr,   soft, nt, nd +bs,  no c/c/ e  Skin warm, dry no rashes    Labs: 12/27: reviewed:  Bun 28  Cr 1.7 (1.3)  Wbc 6  hgb 11.6  plts 195    PCCM Problems  Resuscitated cardiac arrest with PEA and bradycardia  Acute respiratory failure, mechanical ventilation  Severe acute metabolic lactic acidosis  Right femoral artery revascularization procedure, 12/20/2022  Bilateral infiltrates, ? 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  No other intervention for now per cardiology.  Zio monitor on discharge.    Finishing antibiotics    Can d/c to rehab when approved.    Patient is new to me today

## 2022-12-27 NOTE — PLAN OF CARE
Goal Outcome Evaluation:  Plan of Care Reviewed With: patient           Outcome Evaluation: Patient seen for VFSS, full report pending. SLP recs upgrade to regular and thins. Meds as sandeep. SLP to sign off. Slow rate.       Patient was not wearing a face mask during this therapy encounter. Therapist used appropriate personal protective equipment including mask, eye protection and gloves.  Mask used was standard procedure mask. Appropriate PPE was worn during the entire therapy session. Hand hygiene was completed before and after therapy session. Patient is not in enhanced droplet precautions.

## 2022-12-27 NOTE — PLAN OF CARE
Goal Outcome Evaluation:  Plan of Care Reviewed With: patient        Progress: improving  Outcome Evaluation: Pt seen for PT this PM and tolerated mobility well. Pt transferred to EOB with SBA, STS with CGA, and ambulated 150ft with rwx and CGA. Pt mildly unsteadiness initially, but improved with progressed gait distance. Pt continues to benefit from rwx use and encouraged use at DC - pt reports having a rwx from previous admission. Pt cued for upright posture and keeping rwx close to him. Demo'd good static standing balance at commode, and completed additional toilet transfer with CGA. Pt agreeable to sitting UIC following session, assisted with repositioning, and left with needs met. PT will continue to follow to progress mobility as tolerated. Anticipate DC home with HHPT pending progress. Pt does have 10 MONY, so may need to assess stair safety prior to DC.

## 2022-12-27 NOTE — THERAPY TREATMENT NOTE
Patient Name: Evangelist Garcia  : 1945    MRN: [id number]                              Today's Date: 2022       Admit Date: 2022    Visit Dx:     ICD-10-CM ICD-9-CM   1. Cardiac arrest (HCC)  I46.9 427.5     Patient Active Problem List   Diagnosis   • Cardiac arrest (HCC)     History reviewed. No pertinent past medical history.  Past Surgical History:   Procedure Laterality Date   • ANGIOPLASTY FEMORAL ARTERY Right 2022    Procedure: ANGIOGRAM   FEMORAL ARTERY WITH OPEN  RIGHT ARTERIAL REPAIR RIGHT FEMORAL CUTDOWN;  Surgeon: Noemi Blount MD;  Location: [facility] ;  Service: Vascular;  Laterality: Right;      General Information     Row Name 22          Physical Therapy Time and Intention    Document Type therapy note (daily note)  -     Mode of Treatment individual therapy;physical therapy  -     Row Name 22 170          General Information    Patient Profile Reviewed yes  -     Existing Precautions/Restrictions fall  -     Row Name 22          Cognition    Orientation Status (Cognition) oriented x 3  -     Row Name 22 170          Safety Issues, Functional Mobility    Impairments Affecting Function (Mobility) balance;endurance/activity tolerance;strength  -           User Key  (r) = Recorded By, (t) = Taken By, (c) = Cosigned By    Initials Name Provider Type     Kia Pickett PT Physical Therapist               Mobility     Row Name 22 170          Bed Mobility    Bed Mobility supine-sit  -     Supine-Sit [place] (Bed Mobility) standby assist;verbal cues  -     Sit-Supine [place] (Bed Mobility) not tested  NT - [facility]  -     Assistive Device (Bed Mobility) bed rails;head of bed elevated  -     Row Name 22          Sit-Stand Transfer    Sit-Stand [place] (Transfers) contact guard;verbal cues  -     Assistive Device (Sit-Stand Transfers) walker, front-wheeled  -     Comment,  (Sit-Stand Transfer) STS from bed and from [facility] with CGA  -     Row Name 12/27/22 1706          Gait/Stairs (Locomotion)    [place] Level (Gait) verbal cues;contact guard  -     Assistive Device (Gait) walker, front-wheeled  -     Distance in Feet (Gait) 150ft  -     Deviations/Abnormal Patterns (Gait) ba decreased;gait speed decreased;stride length decreased  -     Bilateral Gait Deviations forward flexed posture  -     Comment, (Gait/Stairs) Improved gait speed/steadiness with progressed distance  -           User Key  (r) = Recorded By, (t) = Taken By, (c) = Cosigned By    Initials Name Provider Type     Kia Pickett, PT Physical Therapist               Obj/Interventions     Row Name 12/27/22 1707          Motor Skills    Therapeutic Exercise --  10 reps B AP/LAQ/seated marches  -           User Key  (r) = Recorded By, (t) = Taken By, (c) = Cosigned By    Initials Name Provider Type     Kia Pickett, PT Physical Therapist               Goals/Plan    No documentation.                Clinical Impression     Row Name 12/27/22 1707          Pain    Pretreatment Pain Rating 0/10 - no pain  -     Posttreatment Pain Rating 0/10 - no pain  -     Row Name 12/27/22 1707          Plan of Care Review    Plan of Care Reviewed With patient  -     Progress improving  -     Outcome Evaluation Pt seen for PT this PM and tolerated mobility well. Pt transferred to EOB with SBA, STS with CGA, and ambulated 150ft with rwx and CGA. Pt mildly unsteadiness initially, but improved with progressed gait distance. Pt continues to benefit from rwx use and encouraged use at DC - pt reports having a rwx from previous admission. Pt cued for upright posture and keeping rwx close to him. Demo'd good static standing balance at [facility], and completed additional toilet transfer with CGA. Pt agreeable to sitting UIC following session, assisted with repositioning, and left with needs met. PT will continue  to follow to progress mobility as tolerated. Anticipate DC home with HHPT pending progress. Pt does have 10 MONY, so may need to assess stair safety prior to DC.  -     Row Name 12/27/22 1707          Vital Signs    O2 Delivery Pre Treatment room air  -     O2 Delivery Intra Treatment room air  -     O2 Delivery Post Treatment room air  -     Row Name 12/27/22 1707          Positioning and Restraints    Pre-Treatment Position in bed  -     Post Treatment Position chair  -     In Chair notified nsg;reclined;call light within reach;encouraged to call for assist;exit alarm on  -           User Key  (r) = Recorded By, (t) = Taken By, (c) = Cosigned By    Initials Name Provider Type     Kia Pickett PT Physical Therapist               Outcome Measures     Row Name 12/27/22 1714          How much help from another person do you currently need...    Turning from your back to your side while in flat bed without using bedrails? 4  -BH     Moving from lying on back to sitting on the side of a flat bed without bedrails? 3  -BH     Moving to and from a bed to a chair (including a wheelchair)? 3  -BH     Standing up from a chair using your arms (e.g., wheelchair, bedside chair)? 3  -BH     Climbing 3-5 steps with a railing? 3  -BH     To walk in hospital room? 3  -     AM-PAC 6 Clicks Score (PT) 19  -     Highest level of mobility 6 --> Walked 10 steps or more  -     Row Name 12/27/22 1714          Functional Assessment    Outcome Measure Options AM-PAC 6 Clicks Basic Mobility (PT)  -           User Key  (r) = Recorded By, (t) = Taken By, (c) = Cosigned By    Initials Name Provider Type     Kia Pickett PT Physical Therapist                             Physical Therapy Education     Title: PT OT SLP Therapies (Done)     Topic: Physical Therapy (Done)     Point: Mobility training (Done)     Learning Progress Summary           Patient Acceptance, E,TB,D, VU,NR by  at 12/27/2022 1710     Acceptance, E, VU by  at 12/27/2022 1525    Acceptance, E,TB, VU,NR by RE at 12/26/2022 1524    Comment: Discussed POC; purpose of therapy in gerneral and OT in particular.    Acceptance, E,TB,D, VU,NR by  at 12/26/2022 1030   Significant Other Acceptance, E,TB, VU,NR by RE at 12/26/2022 1524    Comment: Discussed POC; purpose of therapy in gerneral and OT in particular.                   Point: Home exercise program (Done)     Learning Progress Summary           Patient Acceptance, E,TB,D, VU,NR by  at 12/27/2022 1714    Acceptance, E, VU by SH at 12/27/2022 1525    Acceptance, E,TB, VU,NR by RE at 12/26/2022 1524    Comment: Discussed POC; purpose of therapy in gerneral and OT in particular.    Acceptance, E,TB,D, VU,NR by  at 12/26/2022 1030   Significant Other Acceptance, E,TB, VU,NR by RE at 12/26/2022 1524    Comment: Discussed POC; purpose of therapy in gerneral and OT in particular.                   Point: Body mechanics (Done)     Learning Progress Summary           Patient Acceptance, E,TB,D, VU,NR by  at 12/27/2022 1714    Acceptance, E, VU by  at 12/27/2022 1525    Acceptance, E,TB, VU,NR by RE at 12/26/2022 1524    Comment: Discussed POC; purpose of therapy in gerneral and OT in particular.    Acceptance, E,TB,D, VU,NR by  at 12/26/2022 1030   Significant Other Acceptance, E,TB, VU,NR by RE at 12/26/2022 1524    Comment: Discussed POC; purpose of therapy in gerneral and OT in particular.                   Point: Precautions (Done)     Learning Progress Summary           Patient Acceptance, E,TB,D, VU,NR by  at 12/27/2022 1714    Acceptance, E, VU by  at 12/27/2022 1525    Acceptance, E,TB, VU,NR by RE at 12/26/2022 1524    Comment: Discussed POC; purpose of therapy in gerneral and OT in particular.    Acceptance, E,TB,D, VU,NR by  at 12/26/2022 1030   Significant Other Acceptance, E,TB, JULY,NR by RE at 12/26/2022 1524    Comment: Discussed POC; purpose of therapy in gerneral and OT  in particular.                               User Key     Initials Effective Dates Name Provider Type Discipline    RE 06/16/21 -  Yulia Simmons OTR Occupational Therapist OT     06/16/21 -  Dania Nieto MS CCC-SLP Speech and Language Pathologist SLP     04/08/22 -  Kia Pickett, PT Physical Therapist PT              PT Recommendation and Plan  Planned Therapy Interventions (PT): balance training, bed mobility training, gait training, home exercise program, patient/family education, strengthening, stair training, transfer training  Plan of Care Reviewed With: patient  Progress: improving  Outcome Evaluation: Pt seen for PT this PM and tolerated mobility well. Pt transferred to EOB with SBA, STS with CGA, and ambulated 150ft with rwx and CGA. Pt mildly unsteadiness initially, but improved with progressed gait distance. Pt continues to benefit from rwx use and encouraged use at DC - pt reports having a rwx from previous admission. Pt cued for upright posture and keeping rwx close to him. Demo'd good static standing balance at commode, and completed additional toilet transfer with CGA. Pt agreeable to sitting UIC following session, assisted with repositioning, and left with needs met. PT will continue to follow to progress mobility as tolerated. Anticipate DC home with HHPT pending progress. Pt does have 10 MONY, so may need to assess stair safety prior to DC.     Time Calculation:    PT Charges     Row Name 12/27/22 1714             Time Calculation    Start Time 1638  -      Stop Time 1705  -      Time Calculation (min) 27 min  -      PT Received On 12/27/22  -      PT - Next Appointment 12/28/22  -         Time Calculation- PT    Total Timed Code Minutes- PT 27 minute(s)  -         Timed Charges    12310 - PT Therapeutic Exercise Minutes 5  -      08206 - Gait Training Minutes  10  -      72517 - PT Therapeutic Activity Minutes 12  -         Total Minutes    Timed Charges Total Minutes 27   -       Total Minutes 27  -            User Key  (r) = Recorded By, (t) = Taken By, (c) = Cosigned By    Initials Name Provider Type     Kia Pickett, PT Physical Therapist              Therapy Charges for Today     Code Description Service Date Service Provider Modifiers Qty    69500966110 HC GAIT TRAINING EA 15 MIN 12/26/2022 Kia Pickett, PT GP 1    05352841668 HC PT EVAL MOD COMPLEXITY 3 12/26/2022 Kia Pickett, PT GP 1    50329607619 HC GAIT TRAINING EA 15 MIN 12/27/2022 Kia Pickett, PT GP 1    86141474111 HC PT THERAPEUTIC ACT EA 15 MIN 12/27/2022 Kia Pickett, PT GP 1          PT G-Codes  Outcome Measure Options: AM-PAC 6 Clicks Basic Mobility (PT)  AM-PAC 6 Clicks Score (PT): 19  AM-PAC 6 Clicks Score (OT): 17  PT Discharge Summary  Anticipated Discharge Disposition (PT): home with assist, home with home health    Kia Pickett PT  12/27/2022

## 2022-12-27 NOTE — MBS/VFSS/FEES
Acute Care - Speech Language Pathology   Swallow Initial Evaluation & Discharge   [facility]     Patient Name: Evangelist Garcia  : 1945  MRN: [id number]  Today's Date: 2022               Admit Date: 2022    Visit Dx:     ICD-10-CM ICD-9-CM   1. Cardiac arrest (HCC)  I46.9 427.5     Patient Active Problem List   Diagnosis   • Cardiac arrest (HCC)     History reviewed. No pertinent past medical history.  Past Surgical History:   Procedure Laterality Date   • ANGIOPLASTY FEMORAL ARTERY Right 2022    Procedure: ANGIOGRAM   FEMORAL ARTERY WITH OPEN  RIGHT ARTERIAL REPAIR RIGHT FEMORAL CUTDOWN;  Surgeon: Noemi Blount MD;  Location: [facility] OR ;  Service: Vascular;  Laterality: Right;       SLP Recommendation and Plan  SLP Swallowing Diagnosis: functional pharyngeal phase (22)  SLP Diet Recommendation: regular textures, thin liquids (22)  Recommended Precautions and Strategies: upright posture during/after eating, small bites of food and sips of liquid (22)  SLP Rec. for Method of Medication Administration: meds whole, with thin liquids, as tolerated (22)     Monitor for Signs of Aspiration: yes, notify SLP if any concerns (22)              Therapy Frequency (Swallow): evaluation only (22)                                           Plan of Care Reviewed With: patient  Outcome Evaluation: Patient seen for VFSS, full report pending. SLP recs upgrade to regular and thins. Meds as sandeep. SLP to sign off.      SWALLOW EVALUATION (last 72 hours)     SLP Adult Swallow Evaluation     Row Name 2245 22 1300                Rehab Evaluation    Document Type evaluation  - re-evaluation  -       Subjective Information -- no complaints  -       Patient Observations -- alert;cooperative  some confusion, trying to use TV remote as phone  -       Patient/Family/Caregiver Comments/Observations -- No family present  during evaluation  -HS       Patient Effort excellent  - good  -       Symptoms Noted During/After Treatment -- none  -          General Information    Patient Profile Reviewed yes  - yes  -       Pertinent History Of Current Problem Resuscitated cardiac arrest  - Pt admitted w/ resuscitated cardiac arrest and respiratory failure; intubated 12/20 - 12/23. Clincal swallow evaluation 12/24/22 rec: NPO.  -       Current Method of Nutrition honey-thick liquids;pureed  - NPO;nasogastric feedings  -       Precautions/Limitations, Vision WFL;for purposes of eval  -SH WFL;for purposes of eval  -HS       Precautions/Limitations, Hearing WFL;for purposes of eval  -SH WFL;for purposes of eval  -HS       Prior Level of Function-Communication WFL  -SH WFL  -       Prior Level of Function-Swallowing no diet consistency restrictions;safe, efficient swallowing in all situations  - no diet consistency restrictions;safe, efficient swallowing in all situations  -       Plans/Goals Discussed with patient;agreed upon  - patient;agreed upon  -       Barriers to Rehab cognitive status  - none identified  -       Patient's Goals for Discharge -- return to PO diet  -          Pain Scale: Numbers Pre/Post-Treatment    Pretreatment Pain Rating 0/10 - no pain  - 0/10 - no pain  -       Posttreatment Pain Rating -- 0/10 - no pain  -          Oral Motor Structure and Function    Dentition Assessment -- natural, present and adequate  -HS       Secretion Management -- WNL/WFL  -HS       Mucosal Quality -- moist, healthy  -          Oral Musculature and Cranial Nerve Assessment    Oral Motor General Assessment -- WFL  -HS          General Eating/Swallowing Observations    Eating/Swallowing Skills -- self-fed;fed by SLP;appropriate self-feeding skills observed  -       Positioning During Eating -- upright in bed  -       Utensils Used -- cup;spoon  -       Consistencies Trialed -- ice chips;thin  liquids;nectar/syrup-thick liquids;honey-thick liquids;pureed  -HS          Clinical Swallow Eval    Clinical Swallow Evaluation Summary -- Clinical swallow re-evaluation completed. Patient with some improvement from previous evaluation. Noted wet voicing with cup sip of thin liquids, multiple swallows with nectar thick liquids. Patient reported he felt like his throat \"closed up\". No overt s/s of aspiration with puree or honey thick liquid trials.  -HS          MBS/VFSS Interpretation    VFSS Summary Patient presents with a functional oropharyngeal swallow. Oral holding and tongue pumping noted intermittently across study. Normal swallow initiation with honey via spoon/cup/straw and nectar via cup/straw without penetration. Normal swallow initiation continued with thins via cup and straw. No penetration or signficant pharyngeal residue post swallow. Slight spill to the valleculae before the swallow with puree. Mild lingual residue post swallow. Rotary mastication with soft solids and regular. Mild valleculae and lingual residue after the swallow with soft solids. Difficulty propelling the bolus to initiate a swallow with regular.  - --          SLP Communication to Radiology    Summary Statement Radiologist present: Dr. Valerio. No penetration or aspiration demonstrated across all trials of liquids and solids.  -SH --          SLP Evaluation Clinical Impression    SLP Swallowing Diagnosis functional pharyngeal phase  - oral dysphagia;suspected pharyngeal dysphagia  -       Functional Impact -- risk of aspiration/pneumonia;risk of malnutrition;risk of dehydration  -       Rehab Potential/Prognosis, Swallowing -- good, to achieve stated therapy goals  -       Swallow Criteria for Skilled Therapeutic Interventions Met -- demonstrates skilled criteria  -          Recommendations    Therapy Frequency (Swallow) evaluation only  - PRN  -       Predicted Duration Therapy Intervention (Days) -- until  discharge  -       SLP Diet Recommendation regular textures;thin liquids  - puree;honey thick liquids;water between meals after oral care, with supervision  -       Recommended Diagnostics -- VFSS ([facility])  -       Recommended Precautions and Strategies upright posture during/after eating;small bites of food and sips of liquid  - upright posture during/after eating;small bites of food and sips of liquid  -       Oral Care Recommendations Oral Care BID/PRN  - Oral Care BID/PRN  -       SLP Rec. for Method of Medication Administration meds whole;with thin liquids;as tolerated  - meds whole;meds crushed;with puree  -       Monitor for Signs of Aspiration yes;notify SLP if any concerns  - yes;notify SLP if any concerns  -       Anticipated Discharge Disposition (SLP) -- home with home health  -             User Key  (r) = Recorded By, (t) = Taken By, (c) = Cosigned By    Initials Name Effective Dates     Mariia Rubalcava MS CCC-SLP 06/08/18 -      Dania Nieto MS CCC-SLP 06/16/21 -                 EDUCATION  The patient has been educated in the following areas:   Dysphagia (Swallowing Impairment).              Time Calculation:    Time Calculation- SLP     Row Name 12/27/22 1525             Time Calculation- [facility]    SLP Start Time 0845  -      SLP Received On 12/27/22  -         Untimed Charges    34526-NZ Motion Fluoro Eval Swallow Minutes 60  -         Total Minutes    Untimed Charges Total Minutes 60  -       Total Minutes 60  -SH            User Key  (r) = Recorded By, (t) = Taken By, (c) = Cosigned By    Initials Name Provider Type     Dania Nieto MS CCC-SLP Speech and Language Pathologist                Therapy Charges for Today     Code Description Service Date Service Provider Modifiers Qty    02457878516 HC ST MOTION FLUORO EVAL SWALLOW 4 12/27/2022 Dania Nieto MS CCC-SLP GN 1               MS SONNY Ramesh  12/27/2022

## 2022-12-27 NOTE — PLAN OF CARE
VSS, no c/o pain, still confused to situation and place at times, incontinent, requiring O2 while asleep, D/C to rehab pending placement. Continue current plan of care.       Problem: Fall Injury Risk  Goal: Absence of Fall and Fall-Related Injury  Outcome: Ongoing, Progressing  Intervention: Identify and Manage Contributors  Recent Flowsheet Documentation  Taken 12/27/2022 0635 by Froilan Blandon, RN  Medication Review/Management: medications reviewed  Taken 12/27/2022 0400 by Froilan Blandon RN  Medication Review/Management: medications reviewed  Taken 12/27/2022 0210 by Froilan Blandon RN  Medication Review/Management: medications reviewed  Taken 12/27/2022 0000 by Froilan Blandon RN  Medication Review/Management: medications reviewed  Intervention: Promote Injury-Free Environment  Recent Flowsheet Documentation  Taken 12/27/2022 0635 by rFoilan Blandon RN  Safety Promotion/Fall Prevention: safety round/check completed  Taken 12/27/2022 0400 by Froilan Blandon RN  Safety Promotion/Fall Prevention: safety round/check completed  Taken 12/27/2022 0210 by Froilan Blandon RN  Safety Promotion/Fall Prevention: safety round/check completed  Taken 12/27/2022 0000 by Froilan Blandon RN  Safety Promotion/Fall Prevention: safety round/check completed     Problem: Adult Inpatient Plan of Care  Goal: Plan of Care Review  Outcome: Ongoing, Progressing  Goal: Patient-Specific Goal (Individualized)  Outcome: Ongoing, Progressing  Goal: Absence of Hospital-Acquired Illness or Injury  Outcome: Ongoing, Progressing  Intervention: Identify and Manage Fall Risk  Recent Flowsheet Documentation  Taken 12/27/2022 0635 by Froilan Blandon RN  Safety Promotion/Fall Prevention: safety round/check completed  Taken 12/27/2022 0400 by Froilan Blandon RN  Safety Promotion/Fall Prevention: safety round/check completed  Taken 12/27/2022 0210 by Froilan Blandon RN  Safety Promotion/Fall Prevention: safety  round/check completed  Taken 12/27/2022 0000 by Froilan Blandon, RN  Safety Promotion/Fall Prevention: safety round/check completed  Intervention: Prevent Skin Injury  Recent Flowsheet Documentation  Taken 12/27/2022 0635 by Froilan Blandon RN  Body Position: position changed independently  Taken 12/27/2022 0400 by Froilan Blandon RN  Body Position: position changed independently  Taken 12/27/2022 0210 by Froilan Blandon RN  Body Position:   weight shifting   supine, legs elevated  Taken 12/27/2022 0000 by Froilan Blandon RN  Body Position: position changed independently  Intervention: Prevent and Manage VTE (Venous Thromboembolism) Risk  Recent Flowsheet Documentation  Taken 12/27/2022 0635 by Froilan Blandon RN  Activity Management: activity adjusted per tolerance  Taken 12/27/2022 0400 by Froilan Blandon RN  Activity Management: activity adjusted per tolerance  Taken 12/27/2022 0210 by Friolan Blandon, RN  Activity Management: activity adjusted per tolerance  Taken 12/27/2022 0000 by Froilan Blandon RN  Activity Management: activity adjusted per tolerance  Goal: Optimal Comfort and Wellbeing  Outcome: Ongoing, Progressing  Goal: Readiness for Transition of Care  Outcome: Ongoing, Progressing   Goal Outcome Evaluation:

## 2022-12-28 ENCOUNTER — READMISSION MANAGEMENT (OUTPATIENT)
Dept: CALL CENTER | Facility: HOSPITAL | Age: 77
End: 2022-12-28

## 2022-12-28 ENCOUNTER — HOME HEALTH ADMISSION (OUTPATIENT)
Dept: HOME HEALTH SERVICES | Facility: HOME HEALTHCARE | Age: 77
End: 2022-12-28
Payer: MEDICARE

## 2022-12-28 ENCOUNTER — APPOINTMENT (OUTPATIENT)
Dept: CARDIOLOGY | Facility: HOSPITAL | Age: 77
DRG: 252 | End: 2022-12-28
Payer: MEDICARE

## 2022-12-28 VITALS
DIASTOLIC BLOOD PRESSURE: 53 MMHG | RESPIRATION RATE: 18 BRPM | TEMPERATURE: 97.5 F | HEART RATE: 81 BPM | BODY MASS INDEX: 36.3 KG/M2 | OXYGEN SATURATION: 92 % | SYSTOLIC BLOOD PRESSURE: 120 MMHG | HEIGHT: 70 IN | WEIGHT: 253.53 LBS

## 2022-12-28 LAB
ALBUMIN SERPL-MCNC: 2.9 G/DL (ref 3.5–5.2)
ANION GAP SERPL CALCULATED.3IONS-SCNC: 9.4 MMOL/L (ref 5–15)
BUN SERPL-MCNC: 30 MG/DL (ref 8–23)
BUN/CREAT SERPL: 17.1 (ref 7–25)
CALCIUM SPEC-SCNC: 8.8 MG/DL (ref 8.6–10.5)
CHLORIDE SERPL-SCNC: 109 MMOL/L (ref 98–107)
CO2 SERPL-SCNC: 23.6 MMOL/L (ref 22–29)
CREAT SERPL-MCNC: 1.75 MG/DL (ref 0.76–1.27)
DEPRECATED RDW RBC AUTO: 45.8 FL (ref 37–54)
EGFRCR SERPLBLD CKD-EPI 2021: 39.1 ML/MIN/1.73
ERYTHROCYTE [DISTWIDTH] IN BLOOD BY AUTOMATED COUNT: 12.8 % (ref 12.3–15.4)
GLUCOSE BLDC GLUCOMTR-MCNC: 134 MG/DL (ref 70–130)
GLUCOSE BLDC GLUCOMTR-MCNC: 159 MG/DL (ref 70–130)
GLUCOSE BLDC GLUCOMTR-MCNC: 224 MG/DL (ref 70–130)
GLUCOSE SERPL-MCNC: 133 MG/DL (ref 65–99)
HCT VFR BLD AUTO: 31.3 % (ref 37.5–51)
HGB BLD-MCNC: 10.8 G/DL (ref 13–17.7)
MCH RBC QN AUTO: 34.1 PG (ref 26.6–33)
MCHC RBC AUTO-ENTMCNC: 34.5 G/DL (ref 31.5–35.7)
MCV RBC AUTO: 98.7 FL (ref 79–97)
PHOSPHATE SERPL-MCNC: 3.9 MG/DL (ref 2.5–4.5)
PLATELET # BLD AUTO: 191 10*3/MM3 (ref 140–450)
PMV BLD AUTO: 8.8 FL (ref 6–12)
POTASSIUM SERPL-SCNC: 4 MMOL/L (ref 3.5–5.2)
RBC # BLD AUTO: 3.17 10*6/MM3 (ref 4.14–5.8)
SODIUM SERPL-SCNC: 142 MMOL/L (ref 136–145)
WBC NRBC COR # BLD: 6.36 10*3/MM3 (ref 3.4–10.8)

## 2022-12-28 PROCEDURE — 93246 EXT ECG>7D<15D RECORDING: CPT

## 2022-12-28 PROCEDURE — 97116 GAIT TRAINING THERAPY: CPT

## 2022-12-28 PROCEDURE — 85027 COMPLETE CBC AUTOMATED: CPT | Performed by: INTERNAL MEDICINE

## 2022-12-28 PROCEDURE — 82962 GLUCOSE BLOOD TEST: CPT

## 2022-12-28 PROCEDURE — 94799 UNLISTED PULMONARY SVC/PX: CPT

## 2022-12-28 PROCEDURE — 63710000001 INSULIN REGULAR HUMAN PER 5 UNITS: Performed by: INTERNAL MEDICINE

## 2022-12-28 PROCEDURE — 80069 RENAL FUNCTION PANEL: CPT | Performed by: INTERNAL MEDICINE

## 2022-12-28 RX ORDER — ASPIRIN 81 MG/1
81 TABLET, CHEWABLE ORAL DAILY
Qty: 30 TABLET | Refills: 0 | Status: SHIPPED | OUTPATIENT
Start: 2022-12-29

## 2022-12-28 RX ORDER — GABAPENTIN 400 MG/1
400 CAPSULE ORAL EVERY 12 HOURS SCHEDULED
Qty: 60 CAPSULE | Refills: 0 | Status: SHIPPED | OUTPATIENT
Start: 2022-12-28 | End: 2023-01-27

## 2022-12-28 RX ADMIN — ASPIRIN 81 MG: 81 TABLET, CHEWABLE ORAL at 08:10

## 2022-12-28 RX ADMIN — APIXABAN 5 MG: 5 TABLET, FILM COATED ORAL at 08:10

## 2022-12-28 RX ADMIN — INSULIN HUMAN 3 UNITS: 100 INJECTION, SOLUTION PARENTERAL at 17:07

## 2022-12-28 RX ADMIN — GABAPENTIN 400 MG: 400 CAPSULE ORAL at 08:10

## 2022-12-28 RX ADMIN — Medication 10 ML: at 08:10

## 2022-12-28 RX ADMIN — ALBUTEROL SULFATE 2.5 MG: 2.5 SOLUTION RESPIRATORY (INHALATION) at 09:20

## 2022-12-28 RX ADMIN — LEVOTHYROXINE SODIUM 100 MCG: 0.1 TABLET ORAL at 06:42

## 2022-12-28 RX ADMIN — METOPROLOL TARTRATE 12.5 MG: 25 TABLET, FILM COATED ORAL at 08:12

## 2022-12-28 RX ADMIN — INSULIN HUMAN 5 UNITS: 100 INJECTION, SOLUTION PARENTERAL at 12:18

## 2022-12-28 NOTE — CASE MANAGEMENT/SOCIAL WORK
Continued Stay Note  [facility]     Patient Name: Evangelist Garcia  MRN: [id number]  Today's Date: 12/28/2022    Admit Date: 12/20/2022    Plan: Home with [facility]   Discharge Plan     Row Name 12/28/22 1527       Plan    Plan Home with [facility]    Patient/Family in Agreement with Plan yes    Plan Comments Pt now plans to return home with [facility].  Has walker at home.  JUAN Harvey RN               Discharge Codes    No documentation.               Expected Discharge Date and Time     Expected Discharge Date Expected Discharge Time    Dec 28, 2022             Lillie Harvey, RN

## 2022-12-28 NOTE — DISCHARGE PLACEMENT REQUEST
Enedina Gooden (77 y.o. Male)     Date of Birth   1945    Social Security Number       Address   [address] [place] [address]    Home Phone   [phone]    MRN   [id number]       [place]    Marital Status                               Admission Date   12/20/22    Admission Type   Emergency    Admitting Provider   Jose Salmon MD    Attending Provider   Ugo Edmonds MD    Department, Room/Bed   [address], [address]       Discharge Date       Discharge Disposition       Discharge Destination                               Attending Provider: Ugo Edmonds MD    Allergies: Nsaids    Isolation: None   Infection: None   Code Status: CPR    Ht: 177.8 cm (70\")   Wt: 115 kg (253 lb 8.5 oz)    Admission Cmt: None   Principal Problem: Cardiac arrest (HCC) [I46.9]                 Active Insurance as of 12/20/2022     Primary Coverage     Payor Plan Insurance Group Employer/Plan Group    MEDICARE MEDICARE A & B      Payor Plan Address Payor Plan Phone Number Payor Plan Fax Number Effective Dates    [address] [phone]  4/1/2008 - None Entered    [facility] [id number]       Subscriber Name Subscriber Birth Date Member ID       ENEDINA GOODEN 1945 [id number]           Secondary Coverage     Payor Plan Insurance Group Employer/Plan Group    [facility] SUPP [id number]     Payor Plan Address Payor Plan Phone Number Payor Plan Fax Number Effective Dates    [address]   12/1/2016 - None Entered    [facility] [id number]       Subscriber Name Subscriber Birth Date Member ID       ENEDINA GOODEN 1945 [id number]                 Emergency Contacts      (Rel.) Home Phone Work Phone Mobile Phone    JoseJamila (Spouse) [phone] -- --    ShanikoSofy dasilva (Daughter) -- -- [phone]

## 2022-12-28 NOTE — PLAN OF CARE
Goal Outcome Evaluation:  VSS. A&OX3-4. Answers orientation questions appropriately at times and then very quickly appears confused again. 02 @ 2LPM added while sleeping. Up with assist x1 to bsdc, episodes of incontinence overnight. Plans to d/c to SNF at discharge. Call light in reach.

## 2022-12-28 NOTE — PLAN OF CARE
Goal Outcome Evaluation:  Plan of Care Reviewed With: patient, spouse        Progress: improving  Outcome Evaluation: Pt seen for PT this AM and tolerated mobility well. Pt's wife present during session today and expressing concern about home. Discussed with wife that pt has been making great improvements and is mobilizing fairly independently at this time. Pt transferred to EOB with SV, stood with SBA, and ambulated 180ft with rwx and SBA-CGA. Pt still with some unsteadiness, but improves with rwx use. Pt's wife clarifies that they only have 4 MONY with steps down to the basement, but pt does not have to use. Pt did 4 steps with 1 HR and 1 HHA and min A x1. Pt cued for one step at a time and taking his time - reports he often descends sideways with B hands on HRs and able to perform that way today. Pt returned to room and agreeable to sitting UIC, left with all needs met. Pt and wife both state feel safe with DC home with HHPT - discussed with RN and CCP following. Pt with no further PT needs, will sign-off.

## 2022-12-28 NOTE — PROGRESS NOTES
Wound check: Right groin dry without any evidence of infection.  Pedal pulses are easily palpable bilaterally.    We will plan staple removal 2 weeks after surgery which would be around January 4.

## 2022-12-28 NOTE — DISCHARGE SUMMARY
[facility]    Admit date: 12/20/2022  Discharge date: 12/28/2022    Admission/discharge diagnosis:  Resuscitated cardiac arrest with PEA and bradycardia  Acute respiratory failure, mechanical ventilation  Severe acute metabolic lactic acidosis  Right femoral artery revascularization procedure, 12/20/2022  Bilateral infiltrates, ? 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 Salmon:  77-year-old gentleman with known history of diabetes mellitus peripheral vascular disease colon cancer 2019 apparently had episode of cardiac arrest at a vascular center undergoing an outpatient procedure.  According to the vascular surgeon who I spoke to reported that patient was having a right lower extremity angiogram for concerns of peripheral vascular disease/claudication.  This procedure was being performed under conscious sedation.  Patient was apparently talking prior to having episode of apnea followed by asystole.  CPR was performed and per vascular surgeon patient was resuscitated within a minute.  EMS was called in and intubation performed per EMS.  Patient has a arterial line in his right groin.  Also has history of stage III chronic kidney disease bifascicular block.  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  Currently patient on the vent unable to give me any meaningful history.  He had another episode of sinus bradycardia and a code was called in the CT scanner and currently is requiring epinephrine drip.  He is tachycardic heart rate in the 40s but maintaining blood pressure on the epinephrine drip.  I reviewed repeat EKG and it showed bradycardia with possible TN prolongation Wenckebach.  I called and discussed with cardiology Dr. hartman and she will come and  review EKG and reviewed with the patient at bedside.  Discussed with patient's family and they wish full support.  Discussed with Dr. Anders Earl and at this time he wishes to leave the femoral A-line in place.    Hospital Course;  No intervention as per Cards, but will be discharged home with zio patch and follow up with cards per their instructions.  CXR improved. Patient weaned to room air.  CKD to baseline.  Resumed full anticoagulation.     Activity: pre admission    Diet: heart health, consistent carbs    Dispo: Home with home health    Follow up:  With cardiology per their instructions  With vascular per their instructions  With PCP in 4-6 weeks    Greater than 30 mins spent in discharging patient home       Discharge Medications      New Medications      Instructions Start Date   aspirin 81 MG chewable tablet   81 mg, Oral, Daily   Start Date: December 29, 2022     gabapentin 400 MG capsule  Commonly known as: NEURONTIN  Replaces: gabapentin 800 MG tablet   400 mg, Oral, Every 12 Hours Scheduled      metoprolol tartrate 25 MG tablet  Commonly known as: LOPRESSOR   12.5 mg, Oral, Every 12 Hours Scheduled         Continue These Medications      Instructions Start Date   acetaminophen 500 MG tablet  Commonly known as: TYLENOL   500 mg, Oral, 2 Times Daily PRN      allopurinol 300 MG tablet  Commonly known as: ZYLOPRIM   300 mg, Oral, Daily      apixaban 5 MG tablet tablet  Commonly known as: ELIQUIS   5 mg, Oral, Every 12 Hours Scheduled      diphenoxylate-atropine 2.5-0.025 MG per tablet  Commonly known as: LOMOTIL   1 tablet, Oral, 4 Times Daily PRN, Reports 3x week  need on home med list      famotidine 20 MG tablet  Commonly known as: PEPCID   20 mg, Oral, Daily      fish oil 1000 MG capsule capsule   1,000 mg, Oral, Daily With Breakfast      insulin glargine 100 UNIT/ML injection  Commonly known as: LANTUS, SEMGLEE   30 Units, Subcutaneous, Nightly      Insulin Lispro 100 UNIT/ML injection  Commonly known  as: humaLOG   16 Units, Subcutaneous, 3 Times Daily Before Meals      levothyroxine 100 MCG tablet  Commonly known as: SYNTHROID, LEVOTHROID   100 mcg, Oral, Daily      melatonin 1 MG tablet   3 mg, Oral, Nightly      montelukast 10 MG tablet  Commonly known as: SINGULAIR   10 mg, Oral, Nightly      MULTIVITAMIN ADULT (MINERALS) PO   1 tablet, Oral, 3 Times Weekly, MWF      simvastatin 20 MG tablet  Commonly known as: ZOCOR   20 mg, Oral, Nightly      sodium bicarbonate 325 MG tablet   650 mg, Oral, 3 Times Daily      tamsulosin 0.4 MG capsule 24 hr capsule  Commonly known as: FLOMAX   1 capsule, Oral, Daily      vitamin B-12 500 MCG tablet  Commonly known as: CYANOCOBALAMIN   500 mcg, Oral, Daily      vitamin D 1.25 MG (76729 UT) capsule capsule  Commonly known as: ERGOCALCIFEROL   50,000 Units, Oral, Weekly, On Mon         Stop These Medications    furosemide 20 MG tablet  Commonly known as: LASIX     gabapentin 800 MG tablet  Commonly known as: NEURONTIN  Replaced by: gabapentin 400 MG capsule     metoprolol succinate XL 25 MG 24 hr tablet  Commonly known as: TOPROL-XL

## 2022-12-28 NOTE — PROGRESS NOTES
[facility]      Mar/chart reviewed  Follow up resusciated arrest  Doesn't provide subjective  Nursing notes report some confusion    Vital Sign Min/Max for last 24 hours  Temp  Min: 97.7 °F (36.5 °C)  Max: 98.1 °F (36.7 °C)   BP  Min: 103/77  Max: 155/69   Pulse  Min: 65  Max: 87   Resp  Min: 16  Max: 18   SpO2  Min: 93 %  Max: 96 %   Flow (L/min)  Min: 0.5  Max: 0.5   Weight  Min: 115 kg (253 lb 8.5 oz)  Max: 115 kg (253 lb 8.5 oz)     Appears ill, alert, oriented times 2, in good spirits  perrl, eomi, normal sclera  mmm, no jvd, trachea midline, neck supple,  chest cta bilaterally, no crackles, no wheezes,   rrr,   soft, nt, nd +bs,  no c/c/ e  Skin warm, dry no rashes    Labs: 12/28: reviewed:  Glucose 133  Bun 30  Cr 1.75  Na 142  Bicarb 23  Wbc 6  Hgb 10.8  plts 191    PCCM Problems  Resuscitated cardiac arrest with PEA and bradycardia  Acute respiratory failure, mechanical ventilation  Severe acute metabolic lactic acidosis  Right femoral artery revascularization procedure, 12/20/2022  Bilateral infiltrates, ? 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  No other intervention for now per cardiology.  Zio monitor on discharge.     Finishing antibiotics   d/c home vs. Rehab.

## 2022-12-28 NOTE — PLAN OF CARE
Goal Outcome Evaluation:  Plan of Care Reviewed With: patient        Progress: improving  Outcome Evaluation: Pt alert and orient during shift. On room air. To d/c home with HH today with spouse, Jamila to transport.

## 2022-12-28 NOTE — PROGRESS NOTES
Patient is discharging today . Spoke to patient and wife who is agreeable to home health and verified face sheet is correct .  Best number is [phone].Orders in [facility] for home health PT. Thank you !

## 2022-12-28 NOTE — THERAPY DISCHARGE NOTE
Patient Name: Evangelist Garcia  : 1945    MRN: [id number]                              Today's Date: 2022       Admit Date: 2022    Visit Dx:     ICD-10-CM ICD-9-CM   1. Cardiac arrest (HCC)  I46.9 427.5     Patient Active Problem List   Diagnosis   • Cardiac arrest (HCC)     History reviewed. No pertinent past medical history.  Past Surgical History:   Procedure Laterality Date   • ANGIOPLASTY FEMORAL ARTERY Right 2022    Procedure: ANGIOGRAM   FEMORAL ARTERY WITH OPEN  RIGHT ARTERIAL REPAIR RIGHT FEMORAL CUTDOWN;  Surgeon: Noemi Blount MD;  Location: [facility] ;  Service: Vascular;  Laterality: Right;      General Information     Row Name 22 1249          Physical Therapy Time and Intention    Document Type therapy note (daily note);discharge treatment  -     Mode of Treatment individual therapy;physical therapy  -     Row Name 22 1249          General Information    Patient Profile Reviewed yes  -     Existing Precautions/Restrictions fall  -     Row Name 22 1249          Cognition    Orientation Status (Cognition) oriented x 3  -     Row Name 22 1249          Safety Issues, Functional Mobility    Impairments Affecting Function (Mobility) balance;endurance/activity tolerance;strength  -           User Key  (r) = Recorded By, (t) = Taken By, (c) = Cosigned By    Initials Name Provider Type     Kia Pickett PT Physical Therapist               Mobility     Row Name 22 1252          Bed Mobility    Bed Mobility supine-sit  -     Supine-Sit [place] (Bed Mobility) verbal cues;supervision  -     Sit-Supine [place] (Bed Mobility) not tested  NT - [facility]  -     Assistive Device (Bed Mobility) bed rails;head of bed elevated  -     Comment, (Bed Mobility) Cues for bedrail use - pt's wife present and stating he has railings on his bed at home  -     Row Name 22 1252          Sit-Stand Transfer    Sit-Stand  [place] (Transfers) verbal cues;standby assist  -     Assistive Device (Sit-Stand Transfers) walker, front-wheeled  -     Row Name 12/28/22 1253          Gait/Stairs (Locomotion)    [place] Level (Gait) standby assist;contact guard;verbal cues  -     Assistive Device (Gait) walker, front-wheeled  -     Distance in Feet (Gait) 180ft  -     Deviations/Abnormal Patterns (Gait) ba decreased;gait speed decreased;stride length decreased  -     Bilateral Gait Deviations forward flexed posture  -     [place] Level (Stairs) minimum assist (75% patient effort);1 person assist;verbal cues  -     Handrail Location (Stairs) right side (ascending);left side (descending)  1 HR + 1 HHA ascending, B hands onf HR descending  -     Number of Steps (Stairs) 4  -BH     Ascending Technique (Stairs) step-to-step  -     Descending Technique (Stairs) step-to-step  -     Comment, (Gait/Stairs) Increased support on stairs for safety, but tolerated well, no SOA or overt LOB  -           User Key  (r) = Recorded By, (t) = Taken By, (c) = Cosigned By    Initials Name Provider Type     Kia Pickett, PT Physical Therapist               Obj/Interventions     Row Name 12/28/22 1301          Motor Skills    Therapeutic Exercise --  10 reps B AP/LAQ/seated marches  -           User Key  (r) = Recorded By, (t) = Taken By, (c) = Cosigned By    Initials Name Provider Type     Kia Pickett, PT Physical Therapist               Goals/Plan    No documentation.                Clinical Impression     Row Name 12/28/22 1301          Pain    Pretreatment Pain Rating 0/10 - no pain  -     Posttreatment Pain Rating 0/10 - no pain  -     Row Name 12/28/22 1301          Plan of Care Review    Plan of Care Reviewed With patient;spouse  -     Progress improving  -     Outcome Evaluation Pt seen for PT this AM and tolerated mobility well. Pt's wife present during session today and expressing concern  about home. Discussed with wife that pt has been making great improvements and is mobilizing fairly independently at this time. Pt transferred to EOB with SV, stood with SBA, and ambulated 180ft with rwx and SBA-CGA. Pt still with some unsteadiness, but improves with rwx use. Pt's wife clarifies that they only have 4 MONY with steps down to the basement, but pt does not have to use. Pt did 4 steps with 1 HR and 1 HHA and min A x1. Pt cued for one step at a time and taking his time - reports he often descends sideways with B hands on HRs and able to perform that way today. Pt returned to room and agreeable to sitting UIC, left with all needs met. Pt and wife both state feel safe with DC home with HHPT - discussed with RN and CCP following. Pt with no further PT needs, will sign-off.  -     Row Name 12/28/22 1301          Vital Signs    O2 Delivery Pre Treatment supplemental O2  -     O2 Delivery Intra Treatment room air  -     O2 Delivery Post Treatment supplemental O2  -     Row Name 12/28/22 1301          Positioning and Restraints    Pre-Treatment Position in bed  -     Post Treatment Position chair  -     In Chair reclined;notified nsg;call light within reach;encouraged to call for assist;exit alarm on;with family/caregiver  -           User Key  (r) = Recorded By, (t) = Taken By, (c) = Cosigned By    Initials Name Provider Type     Kia Pickett, PT Physical Therapist               Outcome Measures     Row Name 12/28/22 1310          How much help from another person do you currently need...    Turning from your back to your side while in flat bed without using bedrails? 4  -BH     Moving from lying on back to sitting on the side of a flat bed without bedrails? 3  -BH     Moving to and from a bed to a chair (including a wheelchair)? 3  -BH     Standing up from a chair using your arms (e.g., wheelchair, bedside chair)? 4  -BH     Climbing 3-5 steps with a railing? 3  -BH     To walk in hospital  room? 4  -     AM-PAC 6 Clicks Score (PT) 21  -     Highest level of mobility 6 --> Walked 10 steps or more  -     Row Name 12/28/22 1310          Functional Assessment    Outcome Measure Options AM-PAC 6 Clicks Basic Mobility (PT)  -           User Key  (r) = Recorded By, (t) = Taken By, (c) = Cosigned By    Initials Name Provider Type     Kia Pickett PT Physical Therapist              Physical Therapy Education     Title: PT OT SLP Therapies (Done)     Topic: Physical Therapy (Done)     Point: Mobility training (Done)     Learning Progress Summary           Patient Acceptance, TB,D,E, VU by  at 12/28/2022 1311    Acceptance, E,TB,D, VU,NR by  at 12/27/2022 1714    Acceptance, E, VU by  at 12/27/2022 1525    Acceptance, E,TB, VU,NR by  at 12/26/2022 1524    Comment: Discussed POC; purpose of therapy in gerneral and OT in particular.    Acceptance, E,TB,D, VU,NR by  at 12/26/2022 1030   Significant Other Acceptance, E,TB, VU,NR by  at 12/26/2022 1524    Comment: Discussed POC; purpose of therapy in gerneral and OT in particular.                   Point: Home exercise program (Done)     Learning Progress Summary           Patient Acceptance, TB,D,E, VU by  at 12/28/2022 1311    Acceptance, E,TB,D, VU,NR by  at 12/27/2022 1714    Acceptance, E, VU by  at 12/27/2022 1525    Acceptance, E,TB, VU,NR by  at 12/26/2022 1524    Comment: Discussed POC; purpose of therapy in gerneral and OT in particular.    Acceptance, E,TB,D, VU,NR by  at 12/26/2022 1030   Significant Other Acceptance, E,TB, VU,NR by  at 12/26/2022 1524    Comment: Discussed POC; purpose of therapy in gerneral and OT in particular.                   Point: Body mechanics (Done)     Learning Progress Summary           Patient Acceptance, TB,D,E, VU by  at 12/28/2022 1311    Acceptance, E,TB,D, VU,NR by  at 12/27/2022 1714    Acceptance, E, VU by SH at 12/27/2022 1525    Acceptance, E,TB, VU,NR by RE at 12/26/2022  1524    Comment: Discussed POC; purpose of therapy in gerneral and OT in particular.    Acceptance, E,TB,D, VU,NR by  at 12/26/2022 1030   Significant Other Acceptance, E,TB, VU,NR by  at 12/26/2022 1524    Comment: Discussed POC; purpose of therapy in gerneral and OT in particular.                   Point: Precautions (Done)     Learning Progress Summary           Patient Acceptance, TB,D,E, VU by  at 12/28/2022 1311    Acceptance, E,TB,D, VU,NR by  at 12/27/2022 1714    Acceptance, E, VU by  at 12/27/2022 1525    Acceptance, E,TB, VU,NR by  at 12/26/2022 1524    Comment: Discussed POC; purpose of therapy in gerneral and OT in particular.    Acceptance, E,TB,D, VU,NR by  at 12/26/2022 1030   Significant Other Acceptance, E,TB, VU,NR by  at 12/26/2022 1524    Comment: Discussed POC; purpose of therapy in gerneral and OT in particular.                               User Key     Initials Effective Dates Name Provider Type Discipline     06/16/21 -  Yulia Simmons OTR Occupational Therapist OT     06/16/21 -  Dania Nieto MS CCC-SLP Speech and Language Pathologist SLP     04/08/22 -  Kia Pickett, PT Physical Therapist PT              PT Recommendation and Plan  Planned Therapy Interventions (PT): balance training, bed mobility training, gait training, home exercise program, patient/family education, strengthening, stair training, transfer training  Plan of Care Reviewed With: patient, spouse  Progress: improving  Outcome Evaluation: Pt seen for PT this AM and tolerated mobility well. Pt's wife present during session today and expressing concern about home. Discussed with wife that pt has been making great improvements and is mobilizing fairly independently at this time. Pt transferred to EOB with SV, stood with SBA, and ambulated 180ft with rwx and SBA-CGA. Pt still with some unsteadiness, but improves with rwx use. Pt's wife clarifies that they only have 4 MONY with steps down to the  basement, but pt does not have to use. Pt did 4 steps with 1 HR and 1 HHA and min A x1. Pt cued for one step at a time and taking his time - reports he often descends sideways with B hands on HRs and able to perform that way today. Pt returned to room and agreeable to sitting UIC, left with all needs met. Pt and wife both state feel safe with DC home with HHPT - discussed with RN and CCP following. Pt with no further PT needs, will sign-off.     Time Calculation:    PT Charges     Row Name 12/28/22 1312             Time Calculation    Start Time 1139  -      Stop Time 1155  -      Time Calculation (min) 16 min  -      PT Received On 12/28/22  -         Time Calculation- PT    Total Timed Code Minutes- PT 16 minute(s)  -         Timed Charges    70194 - Gait Training Minutes  8  -BH      02629 - PT Therapeutic Activity Minutes 8  -BH         Total Minutes    Timed Charges Total Minutes 16  -       Total Minutes 16  -            User Key  (r) = Recorded By, (t) = Taken By, (c) = Cosigned By    Initials Name Provider Type     Kia Pickett PT Physical Therapist              Therapy Charges for Today     Code Description Service Date Service Provider Modifiers Qty    77753803826 HC GAIT TRAINING EA 15 MIN 12/27/2022 Kia Pickett, PT GP 1    22359858597 HC PT THERAPEUTIC ACT EA 15 MIN 12/27/2022 Kia Pickett, PT GP 1    39091509543 HC GAIT TRAINING EA 15 MIN 12/28/2022 Kia Pickett, PT GP 1    51757595363 HC PT THER SUPP EA 15 MIN 12/28/2022 Kia Pickett, PT GP 1          PT G-Codes  Outcome Measure Options: AM-PAC 6 Clicks Basic Mobility (PT)  AM-PAC 6 Clicks Score (PT): 21  AM-PAC 6 Clicks Score (OT): 17    PT Discharge Summary  Anticipated Discharge Disposition (PT): home with assist, home with home health    Kia Pickett PT  12/28/2022

## 2022-12-29 ENCOUNTER — HOME CARE VISIT (OUTPATIENT)
Dept: HOME HEALTH SERVICES | Facility: HOME HEALTHCARE | Age: 77
End: 2022-12-29
Payer: MEDICARE

## 2022-12-29 VITALS
SYSTOLIC BLOOD PRESSURE: 120 MMHG | WEIGHT: 256 LBS | HEART RATE: 62 BPM | TEMPERATURE: 97.3 F | RESPIRATION RATE: 18 BRPM | DIASTOLIC BLOOD PRESSURE: 60 MMHG | HEIGHT: 70 IN | OXYGEN SATURATION: 96 % | BODY MASS INDEX: 36.65 KG/M2

## 2022-12-29 PROCEDURE — G0151 HHCP-SERV OF PT,EA 15 MIN: HCPCS

## 2022-12-29 NOTE — OUTREACH NOTE
Prep Survey    Flowsheet Row Responses   [facility] facility patient discharged from? [facility]   Is LACE score < 7 ? No   Eligibility Readm Mgmt   Discharge diagnosis Resuscitated cardiac arrest with PEA and bradycardia,   Severe acute metabolic lactic acidosis,    Right femoral artery revascularization procedure     Does the patient have one of the following disease processes/diagnoses(primary or secondary)? Other   Does the patient have Home health ordered? Yes   What is the Home health agency?  [facility]   Is there a DME ordered? No   Prep survey completed? Yes          DENTON ANAYA - Registered Nurse        
[FreeTextEntry1] : I believe that Shanice may have a viral sinusitis, and I discussed this with her. I will place her on Augmentin for 48 hours to see if there is a rapid response, which might indicate a bacterial sinusitis. If there is no improvement within 48 hours., I will discontinue Augmentin, and treat with symptomatic treatment alone.

## 2022-12-29 NOTE — Clinical Note
Patient status post cardiac arrest, had angiogram right femoral artery while at [facility]. He presents with decreased LE strength, balance and ambulation endurance now needing a rolling walker to ambulate safely in home. He has shortness of breath with exertion and difficulty with transfers sit to stand from lower surfaces. He does have bars for both commodes, bedcane, 3 in 1 commode he is using in the shower, rolling walker and a cane. Continued physical therapy is needed for instruction in home exercise program, balance activites, safe transfer techniques, gait training with progression to a cane as patient tolerates, breathing exercises to improve shortness of breath with exertion, and home safety.   Plan: Continue 2w4 for therapeutic exercise, transfer and gait training with rolling walker, balance activities in standing. Provide HEP next visit.

## 2022-12-29 NOTE — CASE MANAGEMENT/SOCIAL WORK
Case Management Discharge Note      Final Note: Discharged home with wife and [facility]         Selected Continued Care - Discharged on 12/28/2022 Admission date: 12/20/2022 - Discharge disposition: Home or Self Care    Destination    No services have been selected for the patient.              Durable Medical Equipment    No services have been selected for the patient.              Dialysis/Infusion    No services have been selected for the patient.              Home Medical Care     Service Provider Selected Services Address Phone Fax Patient Preferred    [facility] [address] [address] [phone] --          Therapy    No services have been selected for the patient.              Community Resources    No services have been selected for the patient.              Community & DME    No services have been selected for the patient.                  Transportation Services  Private: Car    Final Discharge Disposition Code: 06 - home with home health care

## 2022-12-30 ENCOUNTER — HOME CARE VISIT (OUTPATIENT)
Dept: HOME HEALTH SERVICES | Facility: HOME HEALTHCARE | Age: 77
End: 2022-12-30
Payer: MEDICARE

## 2022-12-30 VITALS
TEMPERATURE: 97.5 F | SYSTOLIC BLOOD PRESSURE: 120 MMHG | OXYGEN SATURATION: 93 % | HEART RATE: 64 BPM | DIASTOLIC BLOOD PRESSURE: 70 MMHG | RESPIRATION RATE: 18 BRPM

## 2022-12-30 PROCEDURE — G0151 HHCP-SERV OF PT,EA 15 MIN: HCPCS

## 2022-12-30 NOTE — HOME HEALTH
Patient is 77-year-old male with known history of diabetes mellitus peripheral vascular disease colon cancer 2019. Patient had episode of cardiac arrest at a vascular center undergoing right lower extremity angiogram for concerns of peripheral vascular disease/claudication. This procedure was being performed under conscious sedation. Patient experienced apnea and asystole. CPR was performed and per vascular surgeon, patient was resuscitated within a minute. EMS was called in and intubation performed per EMS and patient transported to [facility] ER, and admitted on 12-20-22 to CCU. Patient had a arterial line in his right groin. 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 Patient was placed on a vent.  He had another episode of sinus bradycardia and a code was called. 12-21-22 he had angiogram right femoral artery, 12-25-22 transferred out of CCU and discharged home on 12-28-22 with orders for physical therapy.     Subjective: \" I just had a nose bleed\"      Objective: Patient ambulating with rolling walker from his room where he was cleaning up with spouse assist after having a nose bleed. [facility]ney admission completed as well as physical therapy evaluation. Did reconcile medications in the home. Patient no longer taking Lasix, dosage for Gabapentin and Lopressor changed. Spouse is aware and made changes in patients medi planner. Reviewed emergency prodcedures, patient given home health folder with all information including phone numbers of how to reach agency, nurse on call and therapist, when to seek medical advice and/or call 911 in emergency. Authorization for treatment signed and patient is in agreement with Plan of care. PLOF: Patient was independent with ADLs. He used a cane for ambulating outside of the home. He stated he has not driven much since 2019, he did have increased pain in his feet which he stated limited his  wanting to ambulate for exercise.     Assessment: Patient status post cardiac arrest, FOC: aftercare following angiogram right femoral artery. He presents with decreased LE strength, balance and ambulation endurance now needing a rolling walker to ambulate safely in home. He has shortness of breath with exertion and difficulty with transfers sit to stand from lower surfaces. He does have bars for both commodes, bedcane, 3 in 1 commode he is using in the shower, rolling walker and a cane. Continued physical therapy is needed for instruction in home exercise program, balance activites, safe transfer techniques, gait training with progression to a cane as patient tolerates, breathing exercises to improve shortness of breath with exertion, and home safety.     Plan: Continue 2w4 for therapeutic exercise, transfer and gait training with rolling walker, balance activities in standing. Provide HEP next visit.

## 2023-01-03 ENCOUNTER — HOME CARE VISIT (OUTPATIENT)
Dept: HOME HEALTH SERVICES | Facility: HOME HEALTHCARE | Age: 78
End: 2023-01-03
Payer: MEDICARE

## 2023-01-03 VITALS
OXYGEN SATURATION: 92 % | RESPIRATION RATE: 18 BRPM | SYSTOLIC BLOOD PRESSURE: 102 MMHG | TEMPERATURE: 97.5 F | HEART RATE: 62 BPM | DIASTOLIC BLOOD PRESSURE: 60 MMHG

## 2023-01-03 PROCEDURE — G0151 HHCP-SERV OF PT,EA 15 MIN: HCPCS

## 2023-01-03 NOTE — HOME HEALTH
Subjective: \"I have not had any more nose bleeds.\" \" I did use saline spray and I think it is helping.\"     Objective: Patient up in chair when therapist arrived. Reivewed medications, breathing exercises, patient to use a straw to assist as he did not come home with spirometer. Instructed in therapeutic exercises seated and standing and gave patient HEP. Patient ambulation timed with rolling walker at 3 mins prior to him having to stop and rest. He is to ambulate as least 3 mins 3 times a day as his goal.  No falls, No medication changes.   Fair tolerance to ambulation and exercises as patient was short of breath and stated he was fatigued following.     Plan: Continue with gait training with progression to a cane as tolerated. Therapeutic exercises seated and standing increasing reps as tolerated.

## 2023-01-03 NOTE — HOME HEALTH
Subjective: \" I have been doing more.\"    Objective: Patient performed standing exercises, O2 sat increased to 98%. Patient was able to go up and down 2 steps to enter/exit the home with rail and cane. Verbal cues to take one step at a time and to go slowly. No falls, No medication changes.  Instructed to continue exercises 2 times a day and ambulation with cane during the day, use of rolling walker as he begins to fatigue and to use at night if having to get up to the bathroom. Patient verbalized understanding.     Plan: Continue to progress with additional LE therapeutic exercises on HEP. Gait training with use of single tip cane working to increase timed ambulation to 5 mins.

## 2023-01-04 ENCOUNTER — READMISSION MANAGEMENT (OUTPATIENT)
Dept: CALL CENTER | Facility: HOSPITAL | Age: 78
End: 2023-01-04
Payer: MEDICARE

## 2023-01-04 NOTE — OUTREACH NOTE
Medical Week 1 Survey    Flowsheet Row Responses   [facility] patient discharged from? [place]   Does the patient have one of the following disease processes/diagnoses(primary or secondary)? Other   Week 1 attempt successful? No   Unsuccessful attempts Attempt 1          MICHAEL ROUSE - Registered Nurse

## 2023-01-05 ENCOUNTER — HOME CARE VISIT (OUTPATIENT)
Dept: HOME HEALTH SERVICES | Facility: HOME HEALTHCARE | Age: 78
End: 2023-01-05
Payer: MEDICARE

## 2023-01-05 VITALS
SYSTOLIC BLOOD PRESSURE: 122 MMHG | DIASTOLIC BLOOD PRESSURE: 80 MMHG | HEART RATE: 67 BPM | OXYGEN SATURATION: 94 % | TEMPERATURE: 97.5 F | RESPIRATION RATE: 18 BRPM

## 2023-01-05 PROCEDURE — G0151 HHCP-SERV OF PT,EA 15 MIN: HCPCS

## 2023-01-06 NOTE — HOME HEALTH
Subjective: \" I am tired this am, I didnt sleep well because I could not get warm.\" \" I have an appt. with Dr. Garcia this afternoon.     Objective: Patient up ambulating with rolling walker, he was taking a few steps without it in the kitchen, holding to the counter. Instructed in therapeutic exercises in seated and standing position. Attempted to add mini squat to HEP, patient had difficulty performing correctly and stated he did not want to add this exercise to his routine. Patient did show therapist his incision today and he has staples, Slight redness, he was going to have MD look at them while there. He does go on the 9th to the surgeon to have them removed. Spouse reported he also has a cardiologist appt. on 1-20-23,   Assessment: Good tolerance to exercise this visit. Cues to perform exercises slowly. Patient did have some coughing, less shortness of breath. Pain level 4 to 7. Contacted Dr. Garcia's office to leave patient update and remaining frequency.     Plan: Continue 2 visits next week for therapeutic exercise, transfers and ambulation with cane.

## 2023-01-09 RX ORDER — NYSTATIN 100000 [USP'U]/G
POWDER TOPICAL
Qty: 30 G | Refills: 0 | Status: SHIPPED | OUTPATIENT
Start: 2023-01-09 | End: 2024-01-09

## 2023-01-10 ENCOUNTER — HOME CARE VISIT (OUTPATIENT)
Dept: HOME HEALTH SERVICES | Facility: HOME HEALTHCARE | Age: 78
End: 2023-01-10
Payer: MEDICARE

## 2023-01-10 VITALS
TEMPERATURE: 97.7 F | OXYGEN SATURATION: 93 % | RESPIRATION RATE: 18 BRPM | SYSTOLIC BLOOD PRESSURE: 118 MMHG | DIASTOLIC BLOOD PRESSURE: 64 MMHG | HEART RATE: 49 BPM

## 2023-01-10 PROCEDURE — G0151 HHCP-SERV OF PT,EA 15 MIN: HCPCS

## 2023-01-10 NOTE — HOME HEALTH
Subjective: \" I am feeling dizzy today, I woke up this way.\"     Objective: Patient able to answer the door with use of cane.  Patient received new prescription for Nystatin topical powder [id number]. Apply to affected area 2 to 3 times a day. Staples were removed and patient no longer having any pain. Instructed in diabetic foot care. Patient reports he does go to a podiatrist for his nails to be trimmed.   No falls, New med added to list.   Patient able to ambulate 5 mins and 30 seconds then having to stop and rest. Patients sats 94% after ambulating and pulse remained 49.    Assessment: Patient tolerated ambulation fairly well. Recommended that he change position slowly due to dizziness. Review with patient that if dizziness continues or worsens, is accompanied with any other symptoms, numbness, vision changes, weakness then to seek medical attention, contact MD office. Patient verbalized understanding.     Plan: Will see one more visit this week for therapeutic exercise and balance activities.

## 2023-01-13 ENCOUNTER — HOME CARE VISIT (OUTPATIENT)
Dept: HOME HEALTH SERVICES | Facility: HOME HEALTHCARE | Age: 78
End: 2023-01-13
Payer: MEDICARE

## 2023-01-13 ENCOUNTER — READMISSION MANAGEMENT (OUTPATIENT)
Dept: CALL CENTER | Facility: HOSPITAL | Age: 78
End: 2023-01-13
Payer: MEDICARE

## 2023-01-13 PROCEDURE — G0151 HHCP-SERV OF PT,EA 15 MIN: HCPCS

## 2023-01-13 NOTE — OUTREACH NOTE
Medical Week 3 Survey    Flowsheet Row Responses   [facility] patient discharged from? [place]   Does the patient have one of the following disease processes/diagnoses(primary or secondary)? Other   Week 3 attempt successful? No   Unsuccessful attempts Attempt 1          TREVOR JEROME - Registered Nurse

## 2023-01-14 VITALS
RESPIRATION RATE: 18 BRPM | OXYGEN SATURATION: 93 % | DIASTOLIC BLOOD PRESSURE: 70 MMHG | TEMPERATURE: 97.5 F | SYSTOLIC BLOOD PRESSURE: 120 MMHG | HEART RATE: 53 BPM

## 2023-01-14 NOTE — HOME HEALTH
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 He does use rolling walker when he feels fatigued or unsteady. No falls, No medication changes. Instructed in exercises for low back and HEP given. Patient to use moist heat for hand discomfort and instructed in gentle ROM exercises. Addressed basement steps with use of cane and rail.     Assessment: Patient progressing with ambulation, no wheezing noted this visit or cough. Less short of breath with exertion.     Plan: Will see 2 more visits then discharge from physical therapy and the agency. Have recommended out patient therapy. Patient has cardiologist appt. next week and will determine next step."

## 2023-01-16 LAB
MAXIMAL PREDICTED HEART RATE: 141 BPM
STRESS TARGET HR: 120 BPM

## 2023-01-16 PROCEDURE — 93248 EXT ECG>7D<15D REV&INTERPJ: CPT | Performed by: INTERNAL MEDICINE

## 2023-01-17 ENCOUNTER — HOME CARE VISIT (OUTPATIENT)
Dept: HOME HEALTH SERVICES | Facility: HOME HEALTHCARE | Age: 78
End: 2023-01-17
Payer: MEDICARE

## 2023-01-17 PROCEDURE — G0151 HHCP-SERV OF PT,EA 15 MIN: HCPCS

## 2023-01-18 ENCOUNTER — READMISSION MANAGEMENT (OUTPATIENT)
Dept: CALL CENTER | Facility: HOSPITAL | Age: 78
End: 2023-01-18
Payer: MEDICARE

## 2023-01-18 VITALS
SYSTOLIC BLOOD PRESSURE: 115 MMHG | OXYGEN SATURATION: 95 % | TEMPERATURE: 97.3 F | DIASTOLIC BLOOD PRESSURE: 60 MMHG | RESPIRATION RATE: 18 BRPM | HEART RATE: 58 BPM

## 2023-01-18 NOTE — OUTREACH NOTE
Medical Week 3 Survey    Flowsheet Row Responses   [facility] patient discharged from? [place]   Does the patient have one of the following disease processes/diagnoses(primary or secondary)? Other   Week 3 attempt successful? No   Unsuccessful attempts Attempt 2   Revoke Decline to participate          SARITA ROUSE - Registered Nurse

## 2023-01-19 ENCOUNTER — HOME CARE VISIT (OUTPATIENT)
Dept: HOME HEALTH SERVICES | Facility: HOME HEALTHCARE | Age: 78
End: 2023-01-19
Payer: MEDICARE

## 2023-01-19 PROCEDURE — G0151 HHCP-SERV OF PT,EA 15 MIN: HCPCS

## 2023-01-19 NOTE — HOME HEALTH
"Subjective: \" I'm  a little tired from having company over the weekend.\"     Objecitve: Patient up ambulating without device in the home for short distances. He had to get his cane from the bedroom. No complaints of pain, No falls or medication changes. Patient performed balance activiites with ambulation around cones with use of cane, stairs, obstacle navigation with use of cane.     Assessment: Patient had some difficulty with higher level balance activities such as stepping over cones forward, and stepping over sideways.    Plan: Continue one more visit, finalize home exercise program, balance activiites, with anticipated discharge to follow."

## 2023-01-20 VITALS
OXYGEN SATURATION: 93 % | TEMPERATURE: 97.3 F | RESPIRATION RATE: 18 BRPM | SYSTOLIC BLOOD PRESSURE: 118 MMHG | HEART RATE: 67 BPM | DIASTOLIC BLOOD PRESSURE: 70 MMHG

## 2023-01-20 NOTE — HOME HEALTH
Subjective: No complaints of pain, Patient reported he was doing okay.     Objective: Patient up ambulating without device in home. Discussed patient going to out patient therapy at [facility] as it is close to his home if he has difficulty staying motivated to continue with walking and HEP on his own. Patient could also continue to address balance with out patient therapy. Patient and spouse verbalized understanding. No falls, No change in medications at present.  [place] discharge completed, Tinetti test repeated. Patient demonstrated independence in HEP using teach back method.     Assessment: Patient has met all intial goals.     Plan: Discharge from physical therapy and the agency.

## 2023-03-30 ENCOUNTER — HOSPITAL ENCOUNTER (OUTPATIENT)
Dept: GENERAL RADIOLOGY | Facility: HOSPITAL | Age: 78
Discharge: HOME | End: 2023-03-30
Admitting: UROLOGY
Payer: MEDICARE

## 2023-03-30 ENCOUNTER — TRANSCRIBE ORDERS (OUTPATIENT)
Dept: ADMINISTRATIVE | Facility: HOSPITAL | Age: 78
End: 2023-03-30
Payer: MEDICARE

## 2023-03-30 DIAGNOSIS — N20.0 RENAL CALCULUS: ICD-10-CM

## 2023-03-30 DIAGNOSIS — N20.0 RENAL CALCULUS: Primary | ICD-10-CM

## 2023-03-30 PROCEDURE — 74018 RADEX ABDOMEN 1 VIEW: CPT

## 2023-04-10 ENCOUNTER — LAB (OUTPATIENT)
Dept: LAB | Facility: HOSPITAL | Age: 78
End: 2023-04-10
Payer: MEDICARE

## 2023-04-10 ENCOUNTER — TRANSCRIBE ORDERS (OUTPATIENT)
Dept: ADMINISTRATIVE | Facility: HOSPITAL | Age: 78
End: 2023-04-10
Payer: MEDICARE

## 2023-04-10 DIAGNOSIS — E11.22 TYPE 2 DIABETES MELLITUS WITH ESRD (END-STAGE RENAL DISEASE): ICD-10-CM

## 2023-04-10 DIAGNOSIS — I12.9 HYPERTENSIVE NEPHROPATHY: ICD-10-CM

## 2023-04-10 DIAGNOSIS — N18.32 CHRONIC KIDNEY DISEASE (CKD) STAGE G3B/A1, MODERATELY DECREASED GLOMERULAR FILTRATION RATE (GFR) BETWEEN 30-44 ML/MIN/1.73 SQUARE METER AND ALBUMINURIA CREATININE RATIO LESS THAN 30 MG/G (CMS/H*: Primary | ICD-10-CM

## 2023-04-10 DIAGNOSIS — M10.9 GOUT, UNSPECIFIED CAUSE, UNSPECIFIED CHRONICITY, UNSPECIFIED SITE: ICD-10-CM

## 2023-04-10 DIAGNOSIS — N18.6 TYPE 2 DIABETES MELLITUS WITH ESRD (END-STAGE RENAL DISEASE): ICD-10-CM

## 2023-04-10 DIAGNOSIS — N18.32 CHRONIC KIDNEY DISEASE (CKD) STAGE G3B/A1, MODERATELY DECREASED GLOMERULAR FILTRATION RATE (GFR) BETWEEN 30-44 ML/MIN/1.73 SQUARE METER AND ALBUMINURIA CREATININE RATIO LESS THAN 30 MG/G (CMS/H*: ICD-10-CM

## 2023-04-10 LAB
ALBUMIN SERPL-MCNC: 4.5 G/DL (ref 3.5–5.2)
ALBUMIN UR-MCNC: 1.5 MG/DL
ANION GAP SERPL CALCULATED.3IONS-SCNC: 10.3 MMOL/L (ref 5–15)
BILIRUB UR QL STRIP: NEGATIVE
BUN SERPL-MCNC: 31 MG/DL (ref 8–23)
BUN/CREAT SERPL: 18.7 (ref 7–25)
CALCIUM SPEC-SCNC: 10.2 MG/DL (ref 8.6–10.5)
CHLORIDE SERPL-SCNC: 102 MMOL/L (ref 98–107)
CLARITY UR: CLEAR
CO2 SERPL-SCNC: 27.7 MMOL/L (ref 22–29)
COLOR UR: YELLOW
CREAT SERPL-MCNC: 1.66 MG/DL (ref 0.76–1.27)
CREAT UR-MCNC: 100.5 MG/DL
CREAT UR-MCNC: 100.5 MG/DL
EGFRCR SERPLBLD CKD-EPI 2021: 41.4 ML/MIN/1.73
GLUCOSE SERPL-MCNC: 240 MG/DL (ref 65–99)
GLUCOSE UR STRIP-MCNC: NEGATIVE MG/DL
HGB UR QL STRIP.AUTO: NEGATIVE
KETONES UR QL STRIP: NEGATIVE
LEUKOCYTE ESTERASE UR QL STRIP.AUTO: NEGATIVE
MICROALBUMIN/CREAT UR: 14.9 MG/G
NITRITE UR QL STRIP: NEGATIVE
PH UR STRIP.AUTO: 5.5 [PH] (ref 5–8)
PHOSPHATE SERPL-MCNC: 3.2 MG/DL (ref 2.5–4.5)
POTASSIUM SERPL-SCNC: 4.6 MMOL/L (ref 3.5–5.2)
PROT ?TM UR-MCNC: 11.2 MG/DL
PROT UR QL STRIP: ABNORMAL
PROT/CREAT UR: 111.4 MG/G CREA (ref 0–200)
SODIUM SERPL-SCNC: 140 MMOL/L (ref 136–145)
SP GR UR STRIP: 1.02 (ref 1–1.03)
UROBILINOGEN UR QL STRIP: ABNORMAL

## 2023-04-10 PROCEDURE — 36415 COLL VENOUS BLD VENIPUNCTURE: CPT

## 2023-04-10 PROCEDURE — 82570 ASSAY OF URINE CREATININE: CPT

## 2023-04-10 PROCEDURE — 82043 UR ALBUMIN QUANTITATIVE: CPT

## 2023-04-10 PROCEDURE — 80069 RENAL FUNCTION PANEL: CPT

## 2023-04-10 PROCEDURE — 81003 URINALYSIS AUTO W/O SCOPE: CPT

## 2023-04-10 PROCEDURE — 84156 ASSAY OF PROTEIN URINE: CPT

## 2023-09-25 ENCOUNTER — TRANSCRIBE ORDERS (OUTPATIENT)
Dept: ADMINISTRATIVE | Facility: HOSPITAL | Age: 78
End: 2023-09-25
Payer: MEDICARE

## 2023-09-25 ENCOUNTER — LAB (OUTPATIENT)
Dept: LAB | Facility: HOSPITAL | Age: 78
End: 2023-09-25
Payer: MEDICARE

## 2023-09-25 DIAGNOSIS — N18.32 CHRONIC KIDNEY DISEASE (CKD) STAGE G3B/A1, MODERATELY DECREASED GLOMERULAR FILTRATION RATE (GFR) BETWEEN 30-44 ML/MIN/1.73 SQUARE METER AND ALBUMINURIA CREATININE RATIO LESS THAN 30 MG/G (CMS/H*: Primary | ICD-10-CM

## 2023-09-25 DIAGNOSIS — E11.22 TYPE 2 DIABETES MELLITUS WITH DIABETIC CHRONIC KIDNEY DISEASE, UNSPECIFIED CKD STAGE, UNSPECIFIED WHETHER LONG TERM INSULIN USE: ICD-10-CM

## 2023-09-25 DIAGNOSIS — N20.0 URIC ACID NEPHROLITHIASIS: ICD-10-CM

## 2023-09-25 DIAGNOSIS — I12.9 HYPERTENSIVE NEPHROPATHY: ICD-10-CM

## 2023-09-25 DIAGNOSIS — N18.32 CHRONIC KIDNEY DISEASE (CKD) STAGE G3B/A1, MODERATELY DECREASED GLOMERULAR FILTRATION RATE (GFR) BETWEEN 30-44 ML/MIN/1.73 SQUARE METER AND ALBUMINURIA CREATININE RATIO LESS THAN 30 MG/G (CMS/H*: ICD-10-CM

## 2023-09-25 LAB
ALBUMIN SERPL-MCNC: 4.6 G/DL (ref 3.5–5.2)
ALBUMIN UR-MCNC: 5.2 MG/DL
ANION GAP SERPL CALCULATED.3IONS-SCNC: 12.6 MMOL/L (ref 5–15)
BACTERIA UR QL AUTO: NORMAL /HPF
BILIRUB UR QL STRIP: NEGATIVE
BUN SERPL-MCNC: 24 MG/DL (ref 8–23)
BUN/CREAT SERPL: 12.4 (ref 7–25)
CALCIUM SPEC-SCNC: 10.2 MG/DL (ref 8.6–10.5)
CHLORIDE SERPL-SCNC: 100 MMOL/L (ref 98–107)
CLARITY UR: CLEAR
CO2 SERPL-SCNC: 29.4 MMOL/L (ref 22–29)
COLOR UR: YELLOW
CREAT SERPL-MCNC: 1.94 MG/DL (ref 0.76–1.27)
CREAT UR-MCNC: 223.5 MG/DL
CREAT UR-MCNC: 223.5 MG/DL
EGFRCR SERPLBLD CKD-EPI 2021: 34.4 ML/MIN/1.73
GLUCOSE SERPL-MCNC: 215 MG/DL (ref 65–99)
GLUCOSE UR STRIP-MCNC: NEGATIVE MG/DL
HGB UR QL STRIP.AUTO: NEGATIVE
HYALINE CASTS UR QL AUTO: NORMAL /LPF
KETONES UR QL STRIP: ABNORMAL
LEUKOCYTE ESTERASE UR QL STRIP.AUTO: NEGATIVE
MICROALBUMIN/CREAT UR: 23.3 MG/G
NITRITE UR QL STRIP: NEGATIVE
PH UR STRIP.AUTO: 5.5 [PH] (ref 5–8)
PHOSPHATE SERPL-MCNC: 2.4 MG/DL (ref 2.5–4.5)
POTASSIUM SERPL-SCNC: 4.4 MMOL/L (ref 3.5–5.2)
PROT ?TM UR-MCNC: 32.8 MG/DL
PROT UR QL STRIP: ABNORMAL
PROT/CREAT UR: 146.8 MG/G CREA (ref 0–200)
RBC # UR STRIP: NORMAL /HPF
REF LAB TEST METHOD: NORMAL
SODIUM SERPL-SCNC: 142 MMOL/L (ref 136–145)
SP GR UR STRIP: 1.03 (ref 1–1.03)
SQUAMOUS #/AREA URNS HPF: NORMAL /HPF
UROBILINOGEN UR QL STRIP: ABNORMAL
WBC # UR STRIP: NORMAL /HPF

## 2023-09-25 PROCEDURE — 36415 COLL VENOUS BLD VENIPUNCTURE: CPT

## 2023-09-25 PROCEDURE — 84156 ASSAY OF PROTEIN URINE: CPT

## 2023-09-25 PROCEDURE — 81001 URINALYSIS AUTO W/SCOPE: CPT

## 2023-09-25 PROCEDURE — 82043 UR ALBUMIN QUANTITATIVE: CPT

## 2023-09-25 PROCEDURE — 80069 RENAL FUNCTION PANEL: CPT

## 2023-09-25 PROCEDURE — 82570 ASSAY OF URINE CREATININE: CPT

## 2024-02-02 ENCOUNTER — LAB (OUTPATIENT)
Dept: LAB | Facility: HOSPITAL | Age: 79
End: 2024-02-02
Payer: MEDICARE

## 2024-02-02 ENCOUNTER — TRANSCRIBE ORDERS (OUTPATIENT)
Dept: ADMINISTRATIVE | Facility: HOSPITAL | Age: 79
End: 2024-02-02
Payer: MEDICARE

## 2024-02-02 DIAGNOSIS — N18.32 CHRONIC KIDNEY DISEASE (CKD) STAGE G3B/A1, MODERATELY DECREASED GLOMERULAR FILTRATION RATE (GFR) BETWEEN 30-44 ML/MIN/1.73 SQUARE METER AND ALBUMINURIA CREATININE RATIO LESS THAN 30 MG/G (CMS/H*: Primary | ICD-10-CM

## 2024-02-02 DIAGNOSIS — N18.6 TYPE 2 DIABETES MELLITUS WITH ESRD (END-STAGE RENAL DISEASE): ICD-10-CM

## 2024-02-02 DIAGNOSIS — I12.9 HYPERTENSIVE NEPHROPATHY: ICD-10-CM

## 2024-02-02 DIAGNOSIS — E11.22 TYPE 2 DIABETES MELLITUS WITH ESRD (END-STAGE RENAL DISEASE): ICD-10-CM

## 2024-02-02 DIAGNOSIS — N18.32 CHRONIC KIDNEY DISEASE (CKD) STAGE G3B/A1, MODERATELY DECREASED GLOMERULAR FILTRATION RATE (GFR) BETWEEN 30-44 ML/MIN/1.73 SQUARE METER AND ALBUMINURIA CREATININE RATIO LESS THAN 30 MG/G (CMS/H*: ICD-10-CM

## 2024-02-02 LAB
ALBUMIN SERPL-MCNC: 4.4 G/DL (ref 3.5–5.2)
ALBUMIN UR-MCNC: <1.2 MG/DL
ANION GAP SERPL CALCULATED.3IONS-SCNC: 13.2 MMOL/L (ref 5–15)
BACTERIA UR QL AUTO: NORMAL /HPF
BILIRUB UR QL STRIP: NEGATIVE
BUN SERPL-MCNC: 26 MG/DL (ref 8–23)
BUN/CREAT SERPL: 14 (ref 7–25)
CALCIUM SPEC-SCNC: 10 MG/DL (ref 8.6–10.5)
CHLORIDE SERPL-SCNC: 100 MMOL/L (ref 98–107)
CLARITY UR: CLEAR
CO2 SERPL-SCNC: 26.8 MMOL/L (ref 22–29)
COLOR UR: YELLOW
CREAT SERPL-MCNC: 1.86 MG/DL (ref 0.76–1.27)
CREAT UR-MCNC: 76.2 MG/DL
CREAT UR-MCNC: 76.2 MG/DL
EGFRCR SERPLBLD CKD-EPI 2021: 36.1 ML/MIN/1.73
GLUCOSE SERPL-MCNC: 323 MG/DL (ref 65–99)
GLUCOSE UR STRIP-MCNC: NEGATIVE MG/DL
HGB UR QL STRIP.AUTO: NEGATIVE
HYALINE CASTS UR QL AUTO: NORMAL /LPF
KETONES UR QL STRIP: NEGATIVE
LEUKOCYTE ESTERASE UR QL STRIP.AUTO: NEGATIVE
MICROALBUMIN/CREAT UR: NORMAL MG/G{CREAT}
NITRITE UR QL STRIP: NEGATIVE
PH UR STRIP.AUTO: 5.5 [PH] (ref 5–8)
PHOSPHATE SERPL-MCNC: 2.7 MG/DL (ref 2.5–4.5)
POTASSIUM SERPL-SCNC: 4 MMOL/L (ref 3.5–5.2)
PROT ?TM UR-MCNC: 6.3 MG/DL
PROT UR QL STRIP: NEGATIVE
PROT/CREAT UR: 82.7 MG/G CREA (ref 0–200)
RBC # UR STRIP: NORMAL /HPF
REF LAB TEST METHOD: NORMAL
SODIUM SERPL-SCNC: 140 MMOL/L (ref 136–145)
SP GR UR STRIP: 1.01 (ref 1–1.03)
SQUAMOUS #/AREA URNS HPF: NORMAL /HPF
UROBILINOGEN UR QL STRIP: NORMAL
WBC # UR STRIP: NORMAL /HPF

## 2024-02-02 PROCEDURE — 82570 ASSAY OF URINE CREATININE: CPT

## 2024-02-02 PROCEDURE — 80069 RENAL FUNCTION PANEL: CPT

## 2024-02-02 PROCEDURE — 81001 URINALYSIS AUTO W/SCOPE: CPT

## 2024-02-02 PROCEDURE — 36415 COLL VENOUS BLD VENIPUNCTURE: CPT

## 2024-02-02 PROCEDURE — 82043 UR ALBUMIN QUANTITATIVE: CPT

## 2024-02-02 PROCEDURE — 84156 ASSAY OF PROTEIN URINE: CPT

## 2024-04-29 ENCOUNTER — TRANSCRIBE ORDERS (OUTPATIENT)
Dept: GENERAL RADIOLOGY | Facility: HOSPITAL | Age: 79
End: 2024-04-29
Payer: MEDICARE

## 2024-04-29 ENCOUNTER — HOSPITAL ENCOUNTER (OUTPATIENT)
Dept: GENERAL RADIOLOGY | Facility: HOSPITAL | Age: 79
Discharge: HOME | End: 2024-04-29
Admitting: UROLOGY
Payer: MEDICARE

## 2024-04-29 DIAGNOSIS — N20.0 CALCULUS, RENAL: ICD-10-CM

## 2024-04-29 DIAGNOSIS — N20.0 CALCULUS, RENAL: Primary | ICD-10-CM

## 2024-04-29 PROCEDURE — 74018 RADEX ABDOMEN 1 VIEW: CPT

## 2024-07-30 ENCOUNTER — TRANSCRIBE ORDERS (OUTPATIENT)
Dept: ADMINISTRATIVE | Facility: HOSPITAL | Age: 79
End: 2024-07-30
Payer: MEDICARE

## 2024-07-30 ENCOUNTER — LAB (OUTPATIENT)
Dept: LAB | Facility: HOSPITAL | Age: 79
End: 2024-07-30
Payer: MEDICARE

## 2024-07-30 DIAGNOSIS — E11.22 TYPE 2 DIABETES MELLITUS WITH DIABETIC CHRONIC KIDNEY DISEASE, UNSPECIFIED CKD STAGE, UNSPECIFIED WHETHER LONG TERM INSULIN USE: ICD-10-CM

## 2024-07-30 DIAGNOSIS — N18.32 CHRONIC KIDNEY DISEASE (CKD) STAGE G3B/A1, MODERATELY DECREASED GLOMERULAR FILTRATION RATE (GFR) BETWEEN 30-44 ML/MIN/1.73 SQUARE METER AND ALBUMINURIA CREATININE RATIO LESS THAN 30 MG/G (CMS/H*: ICD-10-CM

## 2024-07-30 DIAGNOSIS — I12.9 HYPERTENSIVE NEPHROPATHY: ICD-10-CM

## 2024-07-30 DIAGNOSIS — N18.32 CHRONIC KIDNEY DISEASE (CKD) STAGE G3B/A1, MODERATELY DECREASED GLOMERULAR FILTRATION RATE (GFR) BETWEEN 30-44 ML/MIN/1.73 SQUARE METER AND ALBUMINURIA CREATININE RATIO LESS THAN 30 MG/G (CMS/H*: Primary | ICD-10-CM

## 2024-07-30 LAB
ALBUMIN SERPL-MCNC: 4.3 G/DL (ref 3.5–5.2)
ALBUMIN UR-MCNC: 5.8 MG/DL
ANION GAP SERPL CALCULATED.3IONS-SCNC: 12 MMOL/L (ref 5–15)
BACTERIA UR QL AUTO: NORMAL /HPF
BILIRUB UR QL STRIP: NEGATIVE
BUN SERPL-MCNC: 20 MG/DL (ref 8–23)
BUN/CREAT SERPL: 11.4 (ref 7–25)
CALCIUM SPEC-SCNC: 10.2 MG/DL (ref 8.6–10.5)
CHLORIDE SERPL-SCNC: 103 MMOL/L (ref 98–107)
CLARITY UR: CLEAR
CO2 SERPL-SCNC: 27 MMOL/L (ref 22–29)
COLOR UR: YELLOW
CREAT SERPL-MCNC: 1.75 MG/DL (ref 0.76–1.27)
CREAT UR-MCNC: 251.3 MG/DL
CREAT UR-MCNC: 251.3 MG/DL
EGFRCR SERPLBLD CKD-EPI 2021: 38.6 ML/MIN/1.73
GLUCOSE SERPL-MCNC: 181 MG/DL (ref 65–99)
GLUCOSE UR STRIP-MCNC: NEGATIVE MG/DL
HGB UR QL STRIP.AUTO: NEGATIVE
HYALINE CASTS UR QL AUTO: NORMAL /LPF
KETONES UR QL STRIP: ABNORMAL
LEUKOCYTE ESTERASE UR QL STRIP.AUTO: NEGATIVE
MICROALBUMIN/CREAT UR: 23.1 MG/G (ref 0–29)
NITRITE UR QL STRIP: NEGATIVE
PH UR STRIP.AUTO: 5.5 [PH] (ref 5–8)
PHOSPHATE SERPL-MCNC: 2.6 MG/DL (ref 2.5–4.5)
POTASSIUM SERPL-SCNC: 4.4 MMOL/L (ref 3.5–5.2)
PROT ?TM UR-MCNC: 39.8 MG/DL
PROT UR QL STRIP: ABNORMAL
PROT/CREAT UR: 158.4 MG/G CREA (ref 0–200)
RBC # UR STRIP: NORMAL /HPF
REF LAB TEST METHOD: NORMAL
SODIUM SERPL-SCNC: 142 MMOL/L (ref 136–145)
SP GR UR STRIP: 1.03 (ref 1–1.03)
SQUAMOUS #/AREA URNS HPF: NORMAL /HPF
UROBILINOGEN UR QL STRIP: ABNORMAL
WBC # UR STRIP: NORMAL /HPF

## 2024-07-30 PROCEDURE — 36415 COLL VENOUS BLD VENIPUNCTURE: CPT

## 2024-07-30 PROCEDURE — 81001 URINALYSIS AUTO W/SCOPE: CPT

## 2024-07-30 PROCEDURE — 82043 UR ALBUMIN QUANTITATIVE: CPT

## 2024-07-30 PROCEDURE — 80069 RENAL FUNCTION PANEL: CPT

## 2024-07-30 PROCEDURE — 82570 ASSAY OF URINE CREATININE: CPT

## 2024-07-30 PROCEDURE — 84156 ASSAY OF PROTEIN URINE: CPT

## (undated) DEVICE — SINGLE BASIN: Brand: CARDINAL HEALTH

## (undated) DEVICE — LP VESL MAXI 2.5X1MM RED 2PK

## (undated) DEVICE — TUBING, SUCTION, 1/4" X 20', STRAIGHT: Brand: MEDLINE INDUSTRIES, INC.

## (undated) DEVICE — EXTENSION SET, MALE LUER LOCK ADAPTER WITH RETRACTABLE COLLAR

## (undated) DEVICE — TBG PENCL TELESCP MEGADYNE SMOKE EVAC 10FT

## (undated) DEVICE — GAUZE,SPONGE,4"X4",16PLY,XRAY,STRL,LF: Brand: MEDLINE

## (undated) DEVICE — SUT SILK 2/0 SH CR5 18IN C0125

## (undated) DEVICE — SYRINGE KIT,PACKAGED,,150FT,MK 7(ANGIO-ARTERION, 150ML SYR KIT W/QFT,MC)(60729385): Brand: MEDRAD® MARK 7 ARTERION DISPOSABLE SYRINGE 150 ML WITH QUICK FILL TUBE

## (undated) DEVICE — TRAP FLD MINIVAC MEGADYNE 100ML

## (undated) DEVICE — SOL NACL 0.9PCT 1000ML

## (undated) DEVICE — IRRIGATOR BULB ASEPTO 60CC STRL

## (undated) DEVICE — PENCL ES MEGADINE EZ/CLEAN BUTN W/HOLSTR 10FT

## (undated) DEVICE — PK ATS CUST W CARDIOTOMY RESEVOIR

## (undated) DEVICE — SUT SILK 2/0 TIES 18IN A185H

## (undated) DEVICE — CATH TEMPO 5F BER II 65CM: Brand: TEMPO

## (undated) DEVICE — INTENDED FOR TISSUE SEPARATION, AND OTHER PROCEDURES THAT REQUIRE A SHARP SURGICAL BLADE TO PUNCTURE OR CUT.: Brand: BARD-PARKER ® CARBON RIB-BACK BLADES

## (undated) DEVICE — RADIFOCUS GLIDEWIRE: Brand: GLIDEWIRE

## (undated) DEVICE — SUT SILK 3/0 TIES 18IN A184H

## (undated) DEVICE — SYR LUERLOK 20CC BX/50

## (undated) DEVICE — SCANLAN® SUTURE BOOT™ INSTRUMENT JAW COVERS - ORIGINAL YELLOW, STANDARD PKG (5 PAIR/CARTRIDGE, 1 CARTRIDGE/PKG): Brand: SCANLAN® SUTURE BOOT™ INSTRUMENT JAW COVERS

## (undated) DEVICE — SUT PROLN 6/0 BV1 D/A 30IN 8709H

## (undated) DEVICE — PK ANGIO 40

## (undated) DEVICE — GLV SURG BIOGEL M LTX PF 6 1/2

## (undated) DEVICE — GOWN,NON-REINFORCED,SIRUS,SET IN SLV,XXL: Brand: MEDLINE

## (undated) DEVICE — ST ACC MICROPUNCTURE STFF .018 ECHO/PLDM/TP 4F/10CM 21G/7CM

## (undated) DEVICE — STPLR SKIN VISISTAT WD 35CT

## (undated) DEVICE — SUT PROLN 5/0 RB1 D/A 36IN 8556H

## (undated) DEVICE — GW CERBRL JB PTFE STD .035IN 20X145CM

## (undated) DEVICE — 3M™ IOBAN™ 2 ANTIMICROBIAL INCISE DRAPE 6650EZ: Brand: IOBAN™ 2

## (undated) DEVICE — MARKR SKIN W/RULR AND LBL

## (undated) DEVICE — ANTIBACTERIAL UNDYED BRAIDED (POLYGLACTIN 910), SYNTHETIC ABSORBABLE SUTURE: Brand: COATED VICRYL

## (undated) DEVICE — PINNACLE R/O II INTRODUCER SHEATH WITH RADIOPAQUE MARKER: Brand: PINNACLE

## (undated) DEVICE — CVR PROB 96IN LF STRL

## (undated) DEVICE — RADIFOCUS TORQUE DEVICE MULTI-TORQUE VISE: Brand: RADIFOCUS TORQUE DEVICE

## (undated) DEVICE — VESSEL LOOPS X-RAY DETECTABLE: Brand: DEROYAL

## (undated) DEVICE — COUNT NDL MAG XLG

## (undated) DEVICE — SYR LL TP 10ML STRL

## (undated) DEVICE — TBG PRESS 96IN M/F ROT BRAID: Brand: MEDLINE INDUSTRIES, INC.

## (undated) DEVICE — SUT SILK 4/0 TIES 18IN A183H

## (undated) DEVICE — Device

## (undated) DEVICE — PERCLOSE™ PROSTYLE™ SUTURE-MEDIATED CLOSURE AND REPAIR SYSTEM: Brand: PERCLOSE™ PROSTYLE™

## (undated) DEVICE — SYS PERFUS SEP PLATLT W TIPS CUST

## (undated) DEVICE — COVER,MAYO STAND,STERILE: Brand: MEDLINE

## (undated) DEVICE — EQUIPMENT COVER BAG TYPE 48” X 36” (122CM X 91CM): Brand: EQUIPMENT COVER BAG TYPE